# Patient Record
Sex: MALE | Race: WHITE | Employment: OTHER | URBAN - METROPOLITAN AREA
[De-identification: names, ages, dates, MRNs, and addresses within clinical notes are randomized per-mention and may not be internally consistent; named-entity substitution may affect disease eponyms.]

---

## 2017-01-08 NOTE — PROGRESS NOTES
[facility] Progress Note      Patient Name: Junior Mccann   YOB: 1945  Medical Record Number: [id number]  Attending Physician: Artem Courtney. Ángel Rashid M.D.        Date of Visit: 1/9/2017      Chief Complaint  Essential thrombocytosis - tobacco use but quit 2015; uses alcohol socially.        Current Medications     LISINOPRIL 10 MG Oral Tab TAKE 1 TABLET BY MOUTH DAILY Disp: 30 tablet Rfl: 2   Budesonide-Formoterol Fumarate 80-4.5 MCG/ACT Inhalation Aerosol Inhale 2 puffs into the lungs 2 Normal effort; no respiratory distress; clear to auscultation bilaterally. Cardiovascular     Regular rate and rhythm; normal S1S2. Extremities  No lower extremity edema. Integumentary      Skin is warm and dry.   Neurologic           Alert and oriente was not diagnostic and I recommend repeat biopsy. Patient would like to delay until after his planned surgery. Continue hydroxyurea without modification. Platelet count is within normal limits. Repeat labs in 2 weeks.     2.   Left neck mass: Surgery is jessica

## 2017-01-09 ENCOUNTER — OFFICE VISIT (OUTPATIENT)
Dept: HEMATOLOGY/ONCOLOGY | Facility: HOSPITAL | Age: 72
End: 2017-01-09
Attending: SPECIALIST
Payer: MEDICARE

## 2017-01-09 VITALS
HEART RATE: 80 BPM | DIASTOLIC BLOOD PRESSURE: 76 MMHG | OXYGEN SATURATION: 93 % | SYSTOLIC BLOOD PRESSURE: 113 MMHG | BODY MASS INDEX: 25.66 KG/M2 | WEIGHT: 159.63 LBS | HEIGHT: 65.95 IN | RESPIRATION RATE: 18 BRPM | TEMPERATURE: 98 F

## 2017-01-09 DIAGNOSIS — D75.89 MACROCYTOSIS: ICD-10-CM

## 2017-01-09 DIAGNOSIS — D47.3 ESSENTIAL THROMBOCYTHEMIA (HCC): Primary | ICD-10-CM

## 2017-01-09 DIAGNOSIS — R22.1 MASS OF LEFT SIDE OF NECK: ICD-10-CM

## 2017-01-09 LAB
BASOPHILS # BLD AUTO: 0.01 X10(3) UL (ref 0–0.1)
BASOPHILS NFR BLD AUTO: 0.3 %
EOSINOPHIL # BLD AUTO: 0.02 X10(3) UL (ref 0–0.3)
EOSINOPHIL NFR BLD AUTO: 0.6 %
ERYTHROCYTE [DISTWIDTH] IN BLOOD BY AUTOMATED COUNT: 23.2 % (ref 11.5–16)
HAV AB SERPL IA-ACNC: 330 PG/ML (ref 193–986)
HCT VFR BLD AUTO: 31.1 % (ref 37–53)
HEMOLYSIS: 1
HGB BLD-MCNC: 11 G/DL (ref 13–17)
ICTERUS: 1
IMMATURE GRANULOCYTE COUNT: 0.02 X10(3) UL (ref 0–1)
IMMATURE GRANULOCYTE RATIO %: 0.6 %
LIPEMIA: 1
LYMPHOCYTES # BLD AUTO: 0.62 X10(3) UL (ref 0.9–4)
LYMPHOCYTES NFR BLD AUTO: 17.3 %
MCH RBC QN AUTO: 38.9 PG (ref 27–33.2)
MCHC RBC AUTO-ENTMCNC: 35.4 G/DL (ref 31–37)
MCV RBC AUTO: 109.9 FL (ref 80–99)
MONOCYTES # BLD AUTO: 0.52 X10(3) UL (ref 0.1–0.6)
MONOCYTES NFR BLD AUTO: 14.5 %
NEUTROPHIL ABS PRELIM: 2.4 X10 (3) UL (ref 1.3–6.7)
NEUTROPHILS # BLD AUTO: 2.4 X10(3) UL (ref 1.3–6.7)
NEUTROPHILS NFR BLD AUTO: 66.7 %
PLATELET # BLD AUTO: 243 10(3)UL (ref 150–450)
PLATELET MORPHOLOGY: NORMAL
RBC # BLD AUTO: 2.83 X10(6)UL (ref 3.8–5.8)
RED CELL DISTRIBUTION WIDTH-SD: 86.9 FL (ref 35.1–46.3)
WBC # BLD AUTO: 3.6 X10(3) UL (ref 4–13)

## 2017-01-09 PROCEDURE — 99214 OFFICE O/P EST MOD 30 MIN: CPT | Performed by: SPECIALIST

## 2017-01-09 RX ORDER — HYDROXYUREA 500 MG/1
CAPSULE ORAL
Qty: 120 CAPSULE | Refills: 6 | Status: SHIPPED | OUTPATIENT
Start: 2017-01-09 | End: 2017-05-05

## 2017-01-09 NOTE — PROGRESS NOTES
Patient here today for follow up with Kelly Elder for Thrombocythemia. Patient denies pain. Patient stated is currently on Hydrea 500mg BID. Stated he is feeling good. Medication list medical history and medical history were reviewed and updated.     Jhonathan Will

## 2017-01-10 LAB — HEMATOCRIT (CLIENT SUPPLIED): 31.1 %

## 2017-01-16 PROBLEM — R22.1 MASS OF LEFT SIDE OF NECK: Status: ACTIVE | Noted: 2017-01-16

## 2017-01-23 ENCOUNTER — NURSE ONLY (OUTPATIENT)
Dept: HEMATOLOGY/ONCOLOGY | Facility: HOSPITAL | Age: 72
End: 2017-01-23
Attending: SPECIALIST
Payer: MEDICARE

## 2017-01-23 DIAGNOSIS — D47.3 ESSENTIAL THROMBOCYTHEMIA (HCC): Primary | ICD-10-CM

## 2017-01-23 LAB
BASOPHILS # BLD AUTO: 0.02 X10(3) UL (ref 0–0.1)
BASOPHILS NFR BLD AUTO: 0.4 %
EOSINOPHIL # BLD AUTO: 0.04 X10(3) UL (ref 0–0.3)
EOSINOPHIL NFR BLD AUTO: 0.8 %
HCT VFR BLD AUTO: 31.2 % (ref 37–53)
HGB BLD-MCNC: 10.9 G/DL (ref 13–17)
IMMATURE GRANULOCYTE COUNT: 0.02 X10(3) UL (ref 0–1)
IMMATURE GRANULOCYTE RATIO %: 0.4 %
LYMPHOCYTES # BLD AUTO: 0.71 X10(3) UL (ref 0.9–4)
LYMPHOCYTES NFR BLD AUTO: 14.8 %
MCH RBC QN AUTO: 40.7 PG (ref 27–33.2)
MCHC RBC AUTO-ENTMCNC: 34.9 G/DL (ref 31–37)
MCV RBC AUTO: 116.4 FL (ref 80–99)
MONOCYTES # BLD AUTO: 0.73 X10(3) UL (ref 0.1–0.6)
MONOCYTES NFR BLD AUTO: 15.2 %
NEUTROPHIL ABS PRELIM: 3.28 X10 (3) UL (ref 1.3–6.7)
NEUTROPHILS # BLD AUTO: 3.28 X10(3) UL (ref 1.3–6.7)
NEUTROPHILS NFR BLD AUTO: 68.4 %
PLATELET # BLD AUTO: 236 10(3)UL (ref 150–450)
PLATELET MORPHOLOGY: NORMAL
RBC # BLD AUTO: 2.68 X10(6)UL (ref 3.8–5.8)
WBC # BLD AUTO: 4.8 X10(3) UL (ref 4–13)

## 2017-01-23 PROCEDURE — 36415 COLL VENOUS BLD VENIPUNCTURE: CPT

## 2017-01-23 PROCEDURE — 85025 COMPLETE CBC W/AUTO DIFF WBC: CPT

## 2017-01-31 ENCOUNTER — LABORATORY ENCOUNTER (OUTPATIENT)
Dept: LAB | Age: 72
End: 2017-01-31
Attending: OTOLARYNGOLOGY
Payer: MEDICARE

## 2017-01-31 DIAGNOSIS — L72.0 EPIDERMAL CYST: Primary | ICD-10-CM

## 2017-01-31 PROCEDURE — 88304 TISSUE EXAM BY PATHOLOGIST: CPT

## 2017-02-02 DIAGNOSIS — E03.9 ACQUIRED HYPOTHYROIDISM: Primary | ICD-10-CM

## 2017-02-03 RX ORDER — LEVOTHYROXINE SODIUM 112 UG/1
TABLET ORAL
Qty: 90 TABLET | Refills: 0 | Status: SHIPPED | OUTPATIENT
Start: 2017-02-03 | End: 2017-05-22

## 2017-02-03 NOTE — TELEPHONE ENCOUNTER
Refill as per protocol. LOV 11/18/2016    No future appointments.        Pending Prescriptions Disp Refills    LEVOTHYROXINE SODIUM 112 MCG Oral Tab [Pharmacy Med Name: LEVOTHYROXINE 0.112MG (112MCG) TABS] 90 tablet 0     Sig: TAKE 1 TABLET(112 MCG) BY M

## 2017-02-06 ENCOUNTER — MED REC SCAN ONLY (OUTPATIENT)
Dept: FAMILY MEDICINE CLINIC | Facility: CLINIC | Age: 72
End: 2017-02-06

## 2017-02-09 ENCOUNTER — TELEPHONE (OUTPATIENT)
Dept: HEMATOLOGY/ONCOLOGY | Facility: HOSPITAL | Age: 72
End: 2017-02-09

## 2017-02-09 ENCOUNTER — OFFICE VISIT (OUTPATIENT)
Dept: HEMATOLOGY/ONCOLOGY | Facility: HOSPITAL | Age: 72
End: 2017-02-09
Attending: SPECIALIST
Payer: MEDICARE

## 2017-02-09 VITALS
TEMPERATURE: 97 F | HEIGHT: 65.95 IN | HEART RATE: 84 BPM | RESPIRATION RATE: 18 BRPM | DIASTOLIC BLOOD PRESSURE: 75 MMHG | BODY MASS INDEX: 26.03 KG/M2 | OXYGEN SATURATION: 95 % | SYSTOLIC BLOOD PRESSURE: 112 MMHG | WEIGHT: 162 LBS

## 2017-02-09 DIAGNOSIS — C61 PROSTATE CANCER (HCC): ICD-10-CM

## 2017-02-09 DIAGNOSIS — D47.3 ESSENTIAL THROMBOCYTHEMIA (HCC): Primary | ICD-10-CM

## 2017-02-09 LAB
ALBUMIN SERPL-MCNC: 3.7 G/DL (ref 3.5–4.8)
ALP LIVER SERPL-CCNC: 93 U/L (ref 45–117)
ALT SERPL-CCNC: 20 U/L (ref 17–63)
AST SERPL-CCNC: 12 U/L (ref 15–41)
BASOPHILS # BLD AUTO: 0.01 X10(3) UL (ref 0–0.1)
BASOPHILS NFR BLD AUTO: 0.2 %
BILIRUB SERPL-MCNC: 0.6 MG/DL (ref 0.1–2)
BUN BLD-MCNC: 12 MG/DL (ref 8–20)
CALCIUM BLD-MCNC: 9.5 MG/DL (ref 8.3–10.3)
CHLORIDE: 109 MMOL/L (ref 101–111)
CO2: 25 MMOL/L (ref 22–32)
CREAT BLD-MCNC: 0.81 MG/DL (ref 0.7–1.3)
EOSINOPHIL # BLD AUTO: 0.04 X10(3) UL (ref 0–0.3)
EOSINOPHIL NFR BLD AUTO: 1 %
ERYTHROCYTE [DISTWIDTH] IN BLOOD BY AUTOMATED COUNT: 17.6 % (ref 11.5–16)
GLUCOSE BLD-MCNC: 89 MG/DL (ref 70–99)
HCT VFR BLD AUTO: 32.4 % (ref 37–53)
HGB BLD-MCNC: 11.5 G/DL (ref 13–17)
IMMATURE GRANULOCYTE COUNT: 0.01 X10(3) UL (ref 0–1)
IMMATURE GRANULOCYTE RATIO %: 0.2 %
LYMPHOCYTES # BLD AUTO: 0.68 X10(3) UL (ref 0.9–4)
LYMPHOCYTES NFR BLD AUTO: 16.2 %
M PROTEIN MFR SERPL ELPH: 7 G/DL (ref 6.1–8.3)
MCH RBC QN AUTO: 43.6 PG (ref 27–33.2)
MCHC RBC AUTO-ENTMCNC: 35.5 G/DL (ref 31–37)
MCV RBC AUTO: 122.7 FL (ref 80–99)
MONOCYTES # BLD AUTO: 0.49 X10(3) UL (ref 0.1–0.6)
MONOCYTES NFR BLD AUTO: 11.7 %
NEUTROPHIL ABS PRELIM: 2.97 X10 (3) UL (ref 1.3–6.7)
NEUTROPHILS # BLD AUTO: 2.97 X10(3) UL (ref 1.3–6.7)
NEUTROPHILS NFR BLD AUTO: 70.7 %
PLATELET # BLD AUTO: 213 10(3)UL (ref 150–450)
PLATELET MORPHOLOGY: NORMAL
POTASSIUM SERPL-SCNC: 4.3 MMOL/L (ref 3.6–5.1)
PSA SERPL-MCNC: <0.01 NG/ML (ref 0.01–4)
RBC # BLD AUTO: 2.64 X10(6)UL (ref 3.8–5.8)
RED CELL DISTRIBUTION WIDTH-SD: 79.1 FL (ref 35.1–46.3)
SODIUM SERPL-SCNC: 141 MMOL/L (ref 136–144)
WBC # BLD AUTO: 4.2 X10(3) UL (ref 4–13)

## 2017-02-09 PROCEDURE — 99213 OFFICE O/P EST LOW 20 MIN: CPT | Performed by: SPECIALIST

## 2017-02-09 NOTE — TELEPHONE ENCOUNTER
MD Hui Haddad, RN                     Let patient know PSA is undetectable. You can leave message on his cell phone.

## 2017-02-09 NOTE — PROGRESS NOTES
Patient is here today for follow up with Daisy Curtis for Thrombocythemia. Patient denies pain. Stated is taking Hydroxyurea 500mg BID. Stated has no side effects and is feeling good. Medication list and medical history were reviewed and updated.     Educatio

## 2017-02-09 NOTE — PROGRESS NOTES
[facility] Progress Note      Patient Name: Eli Harrell   YOB: 1945  Medical Record Number: [id number]  Attending Physician: Vamshi Funez. Frandy Head M.D.        Date of Visit: 2/9/2017      Chief Complaint  Essential thrombocytosis - Oral Tab TAKE 1 TABLET(112 MCG) BY MOUTH BEFORE BREAKFAST Disp: 90 tablet Rfl: 0   HYDROXYUREA 500 MG Oral Cap TAKE 2 CAPSULE BY MOUTH TWICE DAILY Disp: 120 capsule Rfl: 6   LISINOPRIL 10 MG Oral Tab TAKE 1 TABLET BY MOUTH DAILY Disp: 30 tablet Rfl: 2   Bu Mood and affect appropriate; coherent speech; verbalizes understanding of our discussions today.     Laboratory     Recent Results (from the past 24 hour(s))  -COMP METABOLIC PANEL (14)   Collection Time: 02/09/17  9:55 AM   Result Value Ref Range   Glucos controlled. Plan for repeat bone marrow biopsy on 02/24/2017.     2.   Prostate cancer: PSA is undetectable. Continue active surveillance. Planned Follow Up   Patient will return on 02/24/2017 for bone marrow biopsy.      Risk Level: High - Essential th

## 2017-02-24 ENCOUNTER — OFFICE VISIT (OUTPATIENT)
Dept: HEMATOLOGY/ONCOLOGY | Facility: HOSPITAL | Age: 72
End: 2017-02-24
Attending: SPECIALIST
Payer: MEDICARE

## 2017-02-24 VITALS
RESPIRATION RATE: 18 BRPM | DIASTOLIC BLOOD PRESSURE: 71 MMHG | HEIGHT: 65.95 IN | SYSTOLIC BLOOD PRESSURE: 110 MMHG | BODY MASS INDEX: 25.07 KG/M2 | OXYGEN SATURATION: 93 % | HEART RATE: 75 BPM | WEIGHT: 156 LBS | TEMPERATURE: 98 F

## 2017-02-24 DIAGNOSIS — C61 PROSTATE CANCER (HCC): ICD-10-CM

## 2017-02-24 DIAGNOSIS — D75.839 THROMBOCYTOSIS: Primary | ICD-10-CM

## 2017-02-24 DIAGNOSIS — D47.3 ESSENTIAL THROMBOCYTHEMIA (HCC): ICD-10-CM

## 2017-02-24 LAB
BASOPHILS # BLD AUTO: 0.01 X10(3) UL (ref 0–0.1)
BASOPHILS NFR BLD AUTO: 0.3 %
EOSINOPHIL # BLD AUTO: 0.04 X10(3) UL (ref 0–0.3)
EOSINOPHIL NFR BLD AUTO: 1 %
ERYTHROCYTE [DISTWIDTH] IN BLOOD BY AUTOMATED COUNT: 12.8 % (ref 11.5–16)
HCT VFR BLD AUTO: 33.1 % (ref 37–53)
HGB BLD-MCNC: 11.7 G/DL (ref 13–17)
IMMATURE GRANULOCYTE COUNT: 0.01 X10(3) UL (ref 0–1)
IMMATURE GRANULOCYTE RATIO %: 0.3 %
LYMPHOCYTES # BLD AUTO: 0.71 X10(3) UL (ref 0.9–4)
LYMPHOCYTES NFR BLD AUTO: 18.3 %
MCH RBC QN AUTO: 44.5 PG (ref 27–33.2)
MCHC RBC AUTO-ENTMCNC: 35.3 G/DL (ref 31–37)
MCV RBC AUTO: 125.9 FL (ref 80–99)
MONOCYTES # BLD AUTO: 0.48 X10(3) UL (ref 0.1–0.6)
MONOCYTES NFR BLD AUTO: 12.4 %
NEUTROPHIL ABS PRELIM: 2.63 X10 (3) UL (ref 1.3–6.7)
NEUTROPHILS # BLD AUTO: 2.63 X10(3) UL (ref 1.3–6.7)
NEUTROPHILS NFR BLD AUTO: 67.7 %
PLATELET # BLD AUTO: 260 10(3)UL (ref 150–450)
RBC # BLD AUTO: 2.63 X10(6)UL (ref 3.8–5.8)
RED CELL DISTRIBUTION WIDTH-SD: 58.1 FL (ref 35.1–46.3)
WBC # BLD AUTO: 3.9 X10(3) UL (ref 4–13)

## 2017-02-24 PROCEDURE — 38221 DX BONE MARROW BIOPSIES: CPT | Performed by: SPECIALIST

## 2017-02-24 PROCEDURE — 07DR3ZX EXTRACTION OF ILIAC BONE MARROW, PERCUTANEOUS APPROACH, DIAGNOSTIC: ICD-10-PCS | Performed by: SPECIALIST

## 2017-02-24 PROCEDURE — 38220 DX BONE MARROW ASPIRATIONS: CPT | Performed by: SPECIALIST

## 2017-02-24 NOTE — PATIENT INSTRUCTIONS
Post Bone Marrow Biopsy Instructions    Following the examination:     After your bone marrow exam, a bandage is applied to help minimize bleeding.     If you had local anesthesia, you may have to lie on your back for at least 15 minutes to apply pressure t

## 2017-02-24 NOTE — PROGRESS NOTES
Patient is here today for bone marrow biopsy and aspiration. Patient denies pain.      Education Record    Learner:  Patient    Disease / Diagnosis: Bone marrow biopsy and aspiration    Barriers / Limitations:  None   Comments:    Method:  Brief Watson graham

## 2017-02-28 ENCOUNTER — TELEPHONE (OUTPATIENT)
Dept: FAMILY MEDICINE CLINIC | Facility: CLINIC | Age: 72
End: 2017-02-28

## 2017-02-28 DIAGNOSIS — I10 ESSENTIAL HYPERTENSION: Primary | ICD-10-CM

## 2017-02-28 RX ORDER — LISINOPRIL 10 MG/1
10 TABLET ORAL
Qty: 90 TABLET | Refills: 0 | Status: SHIPPED | OUTPATIENT
Start: 2017-02-28 | End: 2017-12-31

## 2017-02-28 NOTE — TELEPHONE ENCOUNTER
Betty Day from [place] [facility] asking for 90 day refill of \" Lisinopril\" for PT.

## 2017-02-28 NOTE — TELEPHONE ENCOUNTER
Refill as per protocol. LOV 11/18/2016    No future appointments. Pending Prescriptions Disp Refills    lisinopril 10 MG Oral Tab 90 tablet 0     Sig: Take 1 tablet (10 mg total) by mouth once daily.           Kidney:    Lab Results  Component Valu

## 2017-03-02 LAB
CD19+CD10+: 26.4 %
CD19+CD20+: 73 %
CD19+CD22+: 73.3 %
CD19+CD25+: <0.1 %
CD19+CD5+: 2.5 %
CD19+KAPPA+: 44 %
CD19+LAMBDA+: 29.9 %
CD3+CD2+: 99.9 %
CD3+CD4+: 61.9 %
CD3+CD4+CD8+: 1.3 %
CD3+CD5+: 99.1 %
CD3+CD7+: 94.6 %
CD3+CD8+: 34.5 %
CD4+:CD8+ RATIO: 1.8
KAPPA:LAMBDA RATIO: 1.47

## 2017-03-05 PROBLEM — D75.839 THROMBOCYTOSIS: Status: ACTIVE | Noted: 2017-03-05

## 2017-03-05 NOTE — PROGRESS NOTES
[facility] Bone Marrow Biopsy Procedure Note      Patient Name: Yury Hayward   YOB: 1945  Medical Record Number: [id number]  Attending Physician: Madelin Leary M.D.        Date of Procedure: 2/24/2017      Narrative  Patient

## 2017-03-17 ENCOUNTER — TELEPHONE (OUTPATIENT)
Dept: HEMATOLOGY/ONCOLOGY | Facility: HOSPITAL | Age: 72
End: 2017-03-17

## 2017-03-17 ENCOUNTER — NURSE ONLY (OUTPATIENT)
Dept: HEMATOLOGY/ONCOLOGY | Facility: HOSPITAL | Age: 72
End: 2017-03-17
Attending: SPECIALIST
Payer: MEDICARE

## 2017-03-17 DIAGNOSIS — D47.3 ESSENTIAL THROMBOCYTHEMIA (HCC): Primary | ICD-10-CM

## 2017-03-17 LAB
BASOPHILS # BLD AUTO: 0.01 X10(3) UL (ref 0–0.1)
BASOPHILS NFR BLD AUTO: 0.3 %
EOSINOPHIL # BLD AUTO: 0.04 X10(3) UL (ref 0–0.3)
EOSINOPHIL NFR BLD AUTO: 1 %
ERYTHROCYTE [DISTWIDTH] IN BLOOD BY AUTOMATED COUNT: 10.7 % (ref 11.5–16)
HCT VFR BLD AUTO: 36.2 % (ref 37–53)
HGB BLD-MCNC: 12.7 G/DL (ref 13–17)
IMMATURE GRANULOCYTE COUNT: 0.01 X10(3) UL (ref 0–1)
IMMATURE GRANULOCYTE RATIO %: 0.3 %
LYMPHOCYTES # BLD AUTO: 0.73 X10(3) UL (ref 0.9–4)
LYMPHOCYTES NFR BLD AUTO: 18.4 %
MCH RBC QN AUTO: 43.2 PG (ref 27–33.2)
MCHC RBC AUTO-ENTMCNC: 35.1 G/DL (ref 31–37)
MCV RBC AUTO: 123.1 FL (ref 80–99)
MONOCYTES # BLD AUTO: 0.68 X10(3) UL (ref 0.1–0.6)
MONOCYTES NFR BLD AUTO: 17.1 %
NEUTROPHIL ABS PRELIM: 2.5 X10 (3) UL (ref 1.3–6.7)
NEUTROPHILS # BLD AUTO: 2.5 X10(3) UL (ref 1.3–6.7)
NEUTROPHILS NFR BLD AUTO: 62.9 %
PLATELET # BLD AUTO: 343 10(3)UL (ref 150–450)
RBC # BLD AUTO: 2.94 X10(6)UL (ref 3.8–5.8)
RED CELL DISTRIBUTION WIDTH-SD: 49.3 FL (ref 35.1–46.3)
WBC # BLD AUTO: 4 X10(3) UL (ref 4–13)

## 2017-03-17 PROCEDURE — 85025 COMPLETE CBC W/AUTO DIFF WBC: CPT

## 2017-03-17 PROCEDURE — 36415 COLL VENOUS BLD VENIPUNCTURE: CPT

## 2017-03-17 NOTE — TELEPHONE ENCOUNTER
MD Jazmín Garber, RN                     Platelet count is higher but normal. Stay off of hydrea. Continue folic acid. Repeat CBC weekly for now      Patient notified. Stated understanding. Repeated instruction.   Will follow up wit

## 2017-03-24 ENCOUNTER — NURSE ONLY (OUTPATIENT)
Dept: HEMATOLOGY/ONCOLOGY | Facility: HOSPITAL | Age: 72
End: 2017-03-24
Attending: SPECIALIST
Payer: MEDICARE

## 2017-03-24 ENCOUNTER — TELEPHONE (OUTPATIENT)
Dept: HEMATOLOGY/ONCOLOGY | Facility: HOSPITAL | Age: 72
End: 2017-03-24

## 2017-03-24 DIAGNOSIS — D75.839 THROMBOCYTOSIS: ICD-10-CM

## 2017-03-24 LAB
BASOPHILS # BLD AUTO: 0.02 X10(3) UL (ref 0–0.1)
BASOPHILS NFR BLD AUTO: 0.4 %
EOSINOPHIL # BLD AUTO: 0.05 X10(3) UL (ref 0–0.3)
EOSINOPHIL NFR BLD AUTO: 1.1 %
ERYTHROCYTE [DISTWIDTH] IN BLOOD BY AUTOMATED COUNT: 10.9 % (ref 11.5–16)
HCT VFR BLD AUTO: 37.4 % (ref 37–53)
HGB BLD-MCNC: 13.1 G/DL (ref 13–17)
IMMATURE GRANULOCYTE COUNT: 0.01 X10(3) UL (ref 0–1)
IMMATURE GRANULOCYTE RATIO %: 0.2 %
LYMPHOCYTES # BLD AUTO: 0.64 X10(3) UL (ref 0.9–4)
LYMPHOCYTES NFR BLD AUTO: 13.8 %
MCH RBC QN AUTO: 42.1 PG (ref 27–33.2)
MCHC RBC AUTO-ENTMCNC: 35 G/DL (ref 31–37)
MCV RBC AUTO: 120.3 FL (ref 80–99)
MONOCYTES # BLD AUTO: 0.79 X10(3) UL (ref 0.1–0.6)
MONOCYTES NFR BLD AUTO: 17 %
NEUTROPHIL ABS PRELIM: 3.13 X10 (3) UL (ref 1.3–6.7)
NEUTROPHILS # BLD AUTO: 3.13 X10(3) UL (ref 1.3–6.7)
NEUTROPHILS NFR BLD AUTO: 67.5 %
PLATELET # BLD AUTO: 388 10(3)UL (ref 150–450)
RBC # BLD AUTO: 3.11 X10(6)UL (ref 3.8–5.8)
RED CELL DISTRIBUTION WIDTH-SD: 48.8 FL (ref 35.1–46.3)
WBC # BLD AUTO: 4.6 X10(3) UL (ref 4–13)

## 2017-03-24 PROCEDURE — 36415 COLL VENOUS BLD VENIPUNCTURE: CPT

## 2017-03-24 PROCEDURE — 85025 COMPLETE CBC W/AUTO DIFF WBC: CPT

## 2017-03-24 NOTE — TELEPHONE ENCOUNTER
MD Theresa Uribe, RN                     Let him know platelet count is still ok at 388. Repeat labs in 2 weeks      Patient notified of lab results and instruction per Vern Vital.   Stated understanding will return for labs in 2 wee

## 2017-04-07 ENCOUNTER — TELEPHONE (OUTPATIENT)
Dept: HEMATOLOGY/ONCOLOGY | Facility: HOSPITAL | Age: 72
End: 2017-04-07

## 2017-04-07 ENCOUNTER — NURSE ONLY (OUTPATIENT)
Dept: HEMATOLOGY/ONCOLOGY | Facility: HOSPITAL | Age: 72
End: 2017-04-07
Attending: SPECIALIST
Payer: MEDICARE

## 2017-04-07 DIAGNOSIS — D47.3 ESSENTIAL THROMBOCYTHEMIA (HCC): ICD-10-CM

## 2017-04-07 PROCEDURE — 85025 COMPLETE CBC W/AUTO DIFF WBC: CPT

## 2017-04-07 PROCEDURE — 36415 COLL VENOUS BLD VENIPUNCTURE: CPT

## 2017-04-07 NOTE — TELEPHONE ENCOUNTER
MD Malka Vallejo, TARAN                     Let him know that counts are normal. Want him to come in one months for labs and f/u. At that time I'll also be checking his PSA. Patient notified stated understanding.   Transferred t

## 2017-05-05 ENCOUNTER — OFFICE VISIT (OUTPATIENT)
Dept: HEMATOLOGY/ONCOLOGY | Facility: HOSPITAL | Age: 72
End: 2017-05-05
Attending: SPECIALIST
Payer: MEDICARE

## 2017-05-05 ENCOUNTER — TELEPHONE (OUTPATIENT)
Dept: HEMATOLOGY/ONCOLOGY | Facility: HOSPITAL | Age: 72
End: 2017-05-05

## 2017-05-05 VITALS
RESPIRATION RATE: 18 BRPM | SYSTOLIC BLOOD PRESSURE: 135 MMHG | TEMPERATURE: 96 F | OXYGEN SATURATION: 95 % | HEIGHT: 65.95 IN | BODY MASS INDEX: 25.58 KG/M2 | HEART RATE: 75 BPM | WEIGHT: 159.19 LBS | DIASTOLIC BLOOD PRESSURE: 91 MMHG

## 2017-05-05 DIAGNOSIS — E03.9 ACQUIRED HYPOTHYROIDISM: ICD-10-CM

## 2017-05-05 DIAGNOSIS — C61 PROSTATE CANCER (HCC): ICD-10-CM

## 2017-05-05 DIAGNOSIS — D75.839 THROMBOCYTOSIS: ICD-10-CM

## 2017-05-05 DIAGNOSIS — D75.89 MACROCYTOSIS WITHOUT ANEMIA: Primary | ICD-10-CM

## 2017-05-05 PROCEDURE — 99214 OFFICE O/P EST MOD 30 MIN: CPT | Performed by: SPECIALIST

## 2017-05-05 NOTE — PROGRESS NOTES
Follow up with Vern Vital for Essential Thrombocythemia. Patient denies pain. Patient Hydrea is on hold. Medication list and medical history were reviewed and updated.     Education Record    Learner:  Patient    Disease / Diagnosis: essential thrombocythem

## 2017-05-05 NOTE — TELEPHONE ENCOUNTER
MD Tyler Joshua, TARAN                     Let me know PSA is 0. Folate and B12 results are normal. Waiting for one more test that will come next week. He can stop folic acid.

## 2017-05-08 ENCOUNTER — TELEPHONE (OUTPATIENT)
Dept: HEMATOLOGY/ONCOLOGY | Facility: HOSPITAL | Age: 72
End: 2017-05-08

## 2017-05-08 NOTE — TELEPHONE ENCOUNTER
MD Aniceto Aranda RN                     Last lab we're waiting for is normal. The abnormal findings on his CBC could be explained by alcohol so continue to restrain for a month and we'll repeat labs. Patient notified.   Sta

## 2017-05-09 NOTE — PROGRESS NOTES
[facility] Progress Note      Patient Name: Sandrita Bui   YOB: 1945  Medical Record Number: [id number]  Attending Physician: Yoli Mccrary. Shaheed Kuo M.D.        Date of Visit: 5/5/2017      Chief Complaint  Thrombocytosis, prostate ca Take 1 tablet (10 mg total) by mouth once daily.  Disp: 90 tablet Rfl: 0   LEVOTHYROXINE SODIUM 112 MCG Oral Tab TAKE 1 TABLET(112 MCG) BY MOUTH BEFORE BREAKFAST Disp: 90 tablet Rfl: 0   [DISCONTINUED] folic acid 1 MG Oral Tab Take 1 tablet (1 mg total) by 8. 3-10.3 mg/dL   Alkaline Phosphatase 91  U/L   AST 16 15-41 U/L   Alt 23 17-63 U/L   Bilirubin, Total 0.4 0.1-2.0 mg/dL   Total Protein 7.3 6.1-8.3 g/dL   Albumin 3.6 3.5-4.8 g/dL   Sodium 140 136-144 mmol/L   Potassium 4.6 3.6-5.1 mmol/L   Chloride draw. Upon direct questioning, patient admitted that he drinks 6 cans of beer per day. Alcohol can have a direct toxic affect upon the bone marrow and cause macrocytosis. Patient agrees to stop all alcohol use x 1 month.  Labs will be repeated at the end of

## 2017-05-13 PROBLEM — D75.89 MACROCYTOSIS WITHOUT ANEMIA: Status: ACTIVE | Noted: 2017-05-13

## 2017-05-22 DIAGNOSIS — E03.9 ACQUIRED HYPOTHYROIDISM: Primary | ICD-10-CM

## 2017-05-22 RX ORDER — LEVOTHYROXINE SODIUM 112 UG/1
TABLET ORAL
Qty: 90 TABLET | Refills: 0 | Status: SHIPPED | OUTPATIENT
Start: 2017-05-22 | End: 2017-08-16

## 2017-05-22 NOTE — TELEPHONE ENCOUNTER
Refill as per protocol.     LOV 11/18/2016    Future Appointments  Date Time Provider Geoff Zamora   5/26/2017 9:30 AM Yessy Barahona MD [facility]   6/2/2017 9:15 AM [address] [facility] [address] [place] [address]          Pending Prescriptions Disp Refills

## 2017-05-26 ENCOUNTER — OFFICE VISIT (OUTPATIENT)
Dept: FAMILY MEDICINE CLINIC | Facility: CLINIC | Age: 72
End: 2017-05-26

## 2017-05-26 VITALS
BODY MASS INDEX: 25.71 KG/M2 | WEIGHT: 160 LBS | OXYGEN SATURATION: 96 % | HEIGHT: 65.95 IN | SYSTOLIC BLOOD PRESSURE: 110 MMHG | DIASTOLIC BLOOD PRESSURE: 76 MMHG | TEMPERATURE: 99 F | HEART RATE: 64 BPM | RESPIRATION RATE: 16 BRPM

## 2017-05-26 DIAGNOSIS — Z00.00 ENCOUNTER FOR ANNUAL HEALTH EXAMINATION: Primary | ICD-10-CM

## 2017-05-26 DIAGNOSIS — I70.0 ATHEROSCLEROSIS OF AORTA (HCC): ICD-10-CM

## 2017-05-26 DIAGNOSIS — M47.896 OTHER OSTEOARTHRITIS OF SPINE, LUMBAR REGION: ICD-10-CM

## 2017-05-26 DIAGNOSIS — E78.5 HYPERLIPIDEMIA, UNSPECIFIED HYPERLIPIDEMIA TYPE: ICD-10-CM

## 2017-05-26 DIAGNOSIS — I10 ESSENTIAL HYPERTENSION: ICD-10-CM

## 2017-05-26 DIAGNOSIS — H91.93 BILATERAL HEARING LOSS, UNSPECIFIED HEARING LOSS TYPE: ICD-10-CM

## 2017-05-26 DIAGNOSIS — Z23 NEED FOR PNEUMOCOCCAL VACCINATION: ICD-10-CM

## 2017-05-26 DIAGNOSIS — C61 PROSTATE CANCER (HCC): ICD-10-CM

## 2017-05-26 DIAGNOSIS — E03.9 ACQUIRED HYPOTHYROIDISM: ICD-10-CM

## 2017-05-26 DIAGNOSIS — D75.839 THROMBOCYTOSIS: ICD-10-CM

## 2017-05-26 DIAGNOSIS — D75.89 MACROCYTOSIS WITHOUT ANEMIA: ICD-10-CM

## 2017-05-26 PROBLEM — D69.6 THROMBOCYTOPENIA: Chronic | Status: ACTIVE | Noted: 2017-05-26

## 2017-05-26 PROBLEM — D69.6 THROMBOCYTOPENIA (HCC): Chronic | Status: ACTIVE | Noted: 2017-05-26

## 2017-05-26 PROCEDURE — G0009 ADMIN PNEUMOCOCCAL VACCINE: HCPCS | Performed by: FAMILY MEDICINE

## 2017-05-26 PROCEDURE — 96160 PT-FOCUSED HLTH RISK ASSMT: CPT | Performed by: FAMILY MEDICINE

## 2017-05-26 PROCEDURE — 99397 PER PM REEVAL EST PAT 65+ YR: CPT | Performed by: FAMILY MEDICINE

## 2017-05-26 PROCEDURE — G0439 PPPS, SUBSEQ VISIT: HCPCS | Performed by: FAMILY MEDICINE

## 2017-05-26 PROCEDURE — 90732 PPSV23 VACC 2 YRS+ SUBQ/IM: CPT | Performed by: FAMILY MEDICINE

## 2017-05-26 NOTE — PROGRESS NOTES
HPI:   Mariam Wade is a 73year old male who presents for a MA (Medicare Advantage) [facility] (Once per calendar year). Pt is overall doing well  Will be retiring next week and plans to sell home and downsize.  Is looking forward to [facility]  H has No Known Allergies. CURRENT MEDICATIONS:     Outpatient Prescriptions Marked as Taking for the 5/26/17 encounter (Office Visit) with Yun Suárez MD:  aspirin 81 MG Oral Tab Take 81 mg by mouth daily. BIOTIN OR Take by mouth daily.    FOLIC AC surgery , deferred any tx for this and ok w/ pt and wife  MUSCULOSKELETAL: denies back pain  NEURO: denies headaches  PSYCHE: denies depression or anxiety  HEMATOLOGIC: denies hx of anemia  ENDOCRINE: on levothyroxine, feeling fine on current dose  ALL/AST deferred   Rectal: deferred   Extremities: Extremities normal, atraumatic, no cyanosis or edema   Pulses: 2+ and symmetric   Skin: Skin color, texture, turgor normal, no rashes or lesions   Lymph nodes: Cervical, supraclavicular, and axillary nodes normal Acquired hypothyroidism  Continue current levothyroxine dose. Monitor TFTs. 7. Bilateral hearing loss, unspecified hearing loss type  Patient had MRI, IACs in 2013, without significant findings. Defers hearing aids.   States symptoms are stable and is activity?: Light    How would you describe your current health state?: Good    How do you maintain positive mental well-being?: Visiting Family    If you are a male age 38-65 or a female age 47-67, do you take aspirin?: Yes    Have you had any immunization your progress note to see your input here.   Cognitive Assessment     What day of the week is this?: Correct    What month is it?: Correct    What year is it?: Correct    Recall \"Ball\": Correct    Recall \"Flag\": Correct    Recall \"Tree\": Correct Reviewed indication. Written order given.      SPECIFIC DISEASE MONITORING Internal Lab or Procedure External Lab or Procedure   Annual Monitoring of Persistent     Medications (ACE/ARB, digoxin diuretics, anticonvulsants.)    Potassium  Annually

## 2017-05-26 NOTE — PATIENT INSTRUCTIONS
Samuel Romero's SCREENING SCHEDULE   Tests on this list are recommended by your physician but may not be covered, or covered at this frequency, by your insurer. Please check with your insurance carrier before scheduling to verify coverage.     Florina Acuna Covered every 10 years- more often if abnormal Colonoscopy,10 Years due on 01/01/2018 Update [facility] if applicable    Flex Sigmoidoscopy Screen  Covered every 5 years No results found for this or any previous visit. No flowsheet data found. with your prescription benefits, but Medicare does not cover unless Medically needed    Zoster (Not covered by Medicare Part B) No orders found for this or any previous visit.  This may be covered with your pharmacy  prescription benefits     Recommended We

## 2017-05-29 PROBLEM — D69.6 THROMBOCYTOPENIA: Chronic | Status: RESOLVED | Noted: 2017-05-26 | Resolved: 2017-05-29

## 2017-05-29 PROBLEM — R22.1 MASS OF LEFT SIDE OF NECK: Status: RESOLVED | Noted: 2017-01-16 | Resolved: 2017-05-29

## 2017-05-29 PROBLEM — D69.6 THROMBOCYTOPENIA (HCC): Chronic | Status: RESOLVED | Noted: 2017-05-26 | Resolved: 2017-05-29

## 2017-06-02 ENCOUNTER — NURSE ONLY (OUTPATIENT)
Dept: HEMATOLOGY/ONCOLOGY | Facility: HOSPITAL | Age: 72
End: 2017-06-02
Attending: SPECIALIST
Payer: MEDICARE

## 2017-06-02 DIAGNOSIS — C61 PROSTATE CANCER (HCC): ICD-10-CM

## 2017-06-02 DIAGNOSIS — I10 ESSENTIAL HYPERTENSION: ICD-10-CM

## 2017-06-02 DIAGNOSIS — D75.89 MACROCYTOSIS WITHOUT ANEMIA: Primary | ICD-10-CM

## 2017-06-02 DIAGNOSIS — D75.839 THROMBOCYTOSIS: ICD-10-CM

## 2017-06-02 DIAGNOSIS — E03.9 ACQUIRED HYPOTHYROIDISM: ICD-10-CM

## 2017-06-02 PROCEDURE — 80048 BASIC METABOLIC PNL TOTAL CA: CPT

## 2017-06-02 PROCEDURE — 84443 ASSAY THYROID STIM HORMONE: CPT

## 2017-06-02 PROCEDURE — 36415 COLL VENOUS BLD VENIPUNCTURE: CPT

## 2017-06-02 PROCEDURE — 85025 COMPLETE CBC W/AUTO DIFF WBC: CPT

## 2017-06-06 ENCOUNTER — TELEPHONE (OUTPATIENT)
Dept: HEMATOLOGY/ONCOLOGY | Facility: HOSPITAL | Age: 72
End: 2017-06-06

## 2017-06-06 NOTE — TELEPHONE ENCOUNTER
Harvey Duane, MD Lizette Members, RN                     Confirm that patient decreased his alcohol intake. His CBC shows that his MCV has improved. If he did stop drinking, then the improvement is because of that.  Recommend that he continue to mod

## 2017-06-12 NOTE — TELEPHONE ENCOUNTER
Patient stated did not have any alcohol for one month prior to lab draw. Notified of results and follow up. Patient stated will continue to decrease or discontinue alcohol use. Transferred to [facility] to Schedule follow up appointment.

## 2017-08-03 ENCOUNTER — APPOINTMENT (OUTPATIENT)
Dept: HEMATOLOGY/ONCOLOGY | Facility: HOSPITAL | Age: 72
End: 2017-08-03
Attending: SPECIALIST
Payer: MEDICARE

## 2017-08-03 NOTE — PROGRESS NOTES
[facility] Progress Note      Patient Name: Brionna Cook   YOB: 1945  Medical Record Number: [id number]  Attending Physician: Anita Andino M.D.        Date of Visit: 8/4/2017      Chief Complaint  Thrombocytosis, prostate ca prostate cancer. Social History (historical data, reviewed)  Previous tobacco use but quit 2015; uses alcohol daily. Current Medications     Vitamin B-12 1000 MCG Oral Tab Take 1,000 mcg by mouth daily.  Disp:  Rfl:    aspirin 81 MG Oral Tab Take 81 bilaterally. Cardiovascular  Regular rate and rhythm; normal S1S2. Abdomen  Soft; nontender; no masses; no hepatosplenomegaly. Extremities  No lower extremity edema. Neurologic           Alert and oriented x 3; motor and sensory grossly intact.   Psych Impression and Plan   1. Thrombocytosis: Resolved. 2.   Prostate cancer: PSA is undetectable. Continue active surveillance. 3.   Macrocytosis: Resolved. Planned Follow Up   Patient will return for follow up in 6 months.      Risk Level: H

## 2017-08-04 ENCOUNTER — OFFICE VISIT (OUTPATIENT)
Dept: HEMATOLOGY/ONCOLOGY | Facility: HOSPITAL | Age: 72
End: 2017-08-04
Attending: SPECIALIST
Payer: MEDICARE

## 2017-08-04 VITALS
SYSTOLIC BLOOD PRESSURE: 113 MMHG | WEIGHT: 158.5 LBS | TEMPERATURE: 97 F | DIASTOLIC BLOOD PRESSURE: 74 MMHG | HEIGHT: 65.95 IN | OXYGEN SATURATION: 97 % | BODY MASS INDEX: 25.47 KG/M2 | RESPIRATION RATE: 18 BRPM | HEART RATE: 63 BPM

## 2017-08-04 DIAGNOSIS — C61 PROSTATE CANCER (HCC): Primary | ICD-10-CM

## 2017-08-04 DIAGNOSIS — D75.89 MACROCYTOSIS WITHOUT ANEMIA: ICD-10-CM

## 2017-08-04 DIAGNOSIS — D75.839 THROMBOCYTOSIS: ICD-10-CM

## 2017-08-04 LAB
ALBUMIN SERPL-MCNC: 3.7 G/DL (ref 3.5–4.8)
ALP LIVER SERPL-CCNC: 87 U/L (ref 45–117)
ALT SERPL-CCNC: 20 U/L (ref 17–63)
AST SERPL-CCNC: 14 U/L (ref 15–41)
BASOPHILS # BLD AUTO: 0.02 X10(3) UL (ref 0–0.1)
BASOPHILS NFR BLD AUTO: 0.5 %
BILIRUB SERPL-MCNC: 0.6 MG/DL (ref 0.1–2)
BUN BLD-MCNC: 14 MG/DL (ref 8–20)
CALCIUM BLD-MCNC: 9.3 MG/DL (ref 8.3–10.3)
CHLORIDE: 105 MMOL/L (ref 101–111)
CO2: 25 MMOL/L (ref 22–32)
CREAT BLD-MCNC: 0.74 MG/DL (ref 0.7–1.3)
EOSINOPHIL # BLD AUTO: 0.1 X10(3) UL (ref 0–0.3)
EOSINOPHIL NFR BLD AUTO: 2.4 %
ERYTHROCYTE [DISTWIDTH] IN BLOOD BY AUTOMATED COUNT: 13.2 % (ref 11.5–16)
GLUCOSE BLD-MCNC: 96 MG/DL (ref 70–99)
HCT VFR BLD AUTO: 43.2 % (ref 37–53)
HGB BLD-MCNC: 14 G/DL (ref 13–17)
IMMATURE GRANULOCYTE COUNT: 0.01 X10(3) UL (ref 0–1)
IMMATURE GRANULOCYTE RATIO %: 0.2 %
LYMPHOCYTES # BLD AUTO: 0.81 X10(3) UL (ref 0.9–4)
LYMPHOCYTES NFR BLD AUTO: 19.1 %
M PROTEIN MFR SERPL ELPH: 7.1 G/DL (ref 6.1–8.3)
MCH RBC QN AUTO: 32 PG (ref 27–33.2)
MCHC RBC AUTO-ENTMCNC: 32.4 G/DL (ref 31–37)
MCV RBC AUTO: 98.6 FL (ref 80–99)
MONOCYTES # BLD AUTO: 0.76 X10(3) UL (ref 0.1–0.6)
MONOCYTES NFR BLD AUTO: 17.9 %
NEUTROPHIL ABS PRELIM: 2.55 X10 (3) UL (ref 1.3–6.7)
NEUTROPHILS # BLD AUTO: 2.55 X10(3) UL (ref 1.3–6.7)
NEUTROPHILS NFR BLD AUTO: 59.9 %
PLATELET # BLD AUTO: 410 10(3)UL (ref 150–450)
POTASSIUM SERPL-SCNC: 4.7 MMOL/L (ref 3.6–5.1)
PSA SERPL-MCNC: <0.01 NG/ML (ref 0.01–4)
RBC # BLD AUTO: 4.38 X10(6)UL (ref 3.8–5.8)
RED CELL DISTRIBUTION WIDTH-SD: 48.1 FL (ref 35.1–46.3)
SODIUM SERPL-SCNC: 138 MMOL/L (ref 136–144)
WBC # BLD AUTO: 4.3 X10(3) UL (ref 4–13)

## 2017-08-04 PROCEDURE — 99213 OFFICE O/P EST LOW 20 MIN: CPT | Performed by: SPECIALIST

## 2017-08-04 RX ORDER — MELATONIN
1000 DAILY
COMMUNITY
End: 2020-06-30

## 2017-08-04 NOTE — PROGRESS NOTES
Patient is here today for follow up with Dr. Rehan Wagner Thrombocytosis. Patient denies pain. Stated he is feeling good. Medication list and medical history were reviewed and updated.     Education Record    Learner:  Patient and Spouse    Disease / Diagnosis:

## 2017-08-16 DIAGNOSIS — E03.9 ACQUIRED HYPOTHYROIDISM: ICD-10-CM

## 2017-08-16 RX ORDER — LEVOTHYROXINE SODIUM 112 UG/1
TABLET ORAL
Qty: 90 TABLET | Refills: 0 | Status: SHIPPED | OUTPATIENT
Start: 2017-08-16 | End: 2017-12-05

## 2017-08-16 NOTE — TELEPHONE ENCOUNTER
Refill as per protocol. LOV 5/26/2017    No future appointments.        Signed Prescriptions Disp Refills    LEVOTHYROXINE SODIUM 112 MCG Oral Tab 90 tablet 0      Sig: TAKE 1 TABLET(112 MCG) BY MOUTH BEFORE BREAKFAST        Authorizing Provider: Roni Dodson,

## 2017-12-05 ENCOUNTER — OFFICE VISIT (OUTPATIENT)
Dept: FAMILY MEDICINE CLINIC | Facility: CLINIC | Age: 72
End: 2017-12-05

## 2017-12-05 VITALS
BODY MASS INDEX: 24.88 KG/M2 | SYSTOLIC BLOOD PRESSURE: 122 MMHG | TEMPERATURE: 98 F | HEART RATE: 84 BPM | WEIGHT: 154.81 LBS | HEIGHT: 65.95 IN | OXYGEN SATURATION: 98 % | RESPIRATION RATE: 18 BRPM | DIASTOLIC BLOOD PRESSURE: 62 MMHG

## 2017-12-05 DIAGNOSIS — I10 ESSENTIAL HYPERTENSION: ICD-10-CM

## 2017-12-05 DIAGNOSIS — Z51.81 ENCOUNTER FOR MEDICATION MONITORING: ICD-10-CM

## 2017-12-05 DIAGNOSIS — E78.5 HYPERLIPIDEMIA, UNSPECIFIED HYPERLIPIDEMIA TYPE: ICD-10-CM

## 2017-12-05 DIAGNOSIS — C61 PROSTATE CANCER (HCC): ICD-10-CM

## 2017-12-05 DIAGNOSIS — E55.9 VITAMIN D DEFICIENCY: ICD-10-CM

## 2017-12-05 DIAGNOSIS — E03.9 ACQUIRED HYPOTHYROIDISM: ICD-10-CM

## 2017-12-05 DIAGNOSIS — E03.9 ACQUIRED HYPOTHYROIDISM: Primary | ICD-10-CM

## 2017-12-05 PROCEDURE — 99214 OFFICE O/P EST MOD 30 MIN: CPT | Performed by: FAMILY MEDICINE

## 2017-12-05 RX ORDER — LEVOTHYROXINE SODIUM 112 UG/1
TABLET ORAL
Qty: 90 TABLET | Refills: 0 | Status: SHIPPED | OUTPATIENT
Start: 2017-12-05 | End: 2018-03-15

## 2017-12-05 NOTE — PROGRESS NOTES
Thu Holbrook is a 73year old male. HPI:  Patient is here for follow-up on his medications. Is also due for blood work. Feeling fine on his current dose of levothyroxine. No unusual fatigue. Weight stable. Normal bowel movements.   Taking a daily History:  Smoking status: Former Smoker                                                              Packs/day: 0.50      Years: 50.00        Quit date: 5/15/2015  Smokeless tobacco: Former User                        Quit date: 7/1/2015  Alcohol use:  Yes BASIC METABOLIC PANEL (8); Future  - TSH+FREE T4; Future    6. Prostate cancer ([facility] [address]  Last PSA is undetectable. Had follow-up with oncologist in August 2017. Continue active surveillance.

## 2017-12-06 ENCOUNTER — APPOINTMENT (OUTPATIENT)
Dept: LAB | Age: 72
End: 2017-12-06
Attending: FAMILY MEDICINE
Payer: MEDICARE

## 2017-12-06 DIAGNOSIS — E55.9 VITAMIN D DEFICIENCY: ICD-10-CM

## 2017-12-06 DIAGNOSIS — Z51.81 ENCOUNTER FOR MEDICATION MONITORING: ICD-10-CM

## 2017-12-06 DIAGNOSIS — E03.9 ACQUIRED HYPOTHYROIDISM: ICD-10-CM

## 2017-12-06 DIAGNOSIS — E78.5 HYPERLIPIDEMIA, UNSPECIFIED HYPERLIPIDEMIA TYPE: ICD-10-CM

## 2017-12-06 DIAGNOSIS — I10 ESSENTIAL HYPERTENSION: ICD-10-CM

## 2017-12-06 PROCEDURE — 80048 BASIC METABOLIC PNL TOTAL CA: CPT | Performed by: FAMILY MEDICINE

## 2017-12-06 PROCEDURE — 84443 ASSAY THYROID STIM HORMONE: CPT | Performed by: FAMILY MEDICINE

## 2017-12-06 PROCEDURE — 84439 ASSAY OF FREE THYROXINE: CPT | Performed by: FAMILY MEDICINE

## 2017-12-06 PROCEDURE — 80061 LIPID PANEL: CPT | Performed by: FAMILY MEDICINE

## 2017-12-06 PROCEDURE — 82306 VITAMIN D 25 HYDROXY: CPT | Performed by: FAMILY MEDICINE

## 2017-12-06 PROCEDURE — 36415 COLL VENOUS BLD VENIPUNCTURE: CPT | Performed by: FAMILY MEDICINE

## 2017-12-31 DIAGNOSIS — I10 ESSENTIAL HYPERTENSION: ICD-10-CM

## 2018-01-02 RX ORDER — LISINOPRIL 10 MG/1
TABLET ORAL
Qty: 90 TABLET | Refills: 0 | Status: SHIPPED | OUTPATIENT
Start: 2018-01-02 | End: 2018-07-25

## 2018-01-04 ENCOUNTER — TELEPHONE (OUTPATIENT)
Dept: FAMILY MEDICINE CLINIC | Facility: CLINIC | Age: 73
End: 2018-01-04

## 2018-01-04 DIAGNOSIS — E03.9 ACQUIRED HYPOTHYROIDISM: Primary | ICD-10-CM

## 2018-01-04 NOTE — TELEPHONE ENCOUNTER
----- Message from Hood Hines MD sent at 1/3/2018  8:40 PM CST -----  Please notify patient labs with normal fasting blood sugar. Lipid panel with improved cholesterol level versus previous. Excellent HDL. LDL okay at 120.   Vitamin D level is now

## 2018-02-07 NOTE — PROGRESS NOTES
[facility] Progress Note      Patient Name: Eli Harrell   YOB: 1945  Medical Record Number: [id number]  Attending Physician: Vamshi Funez. Frandy Head M.D.        Date of Visit: 2/8/2018      Chief Complaint  Thrombocytosis, prostate ca prostate cancer. Social History (historical data, reviewed)  Previous tobacco use but quit 2015; uses alcohol daily.       Current Medications     LISINOPRIL 10 MG Oral Tab TAKE 1 TABLET(10 MG) BY MOUTH EVERY DAY Disp: 90 tablet Rfl: 0   LEVOTHYROXINE SO Clear to auscultation bilaterally. Cardiovascular  Regular rate and rhythm; normal S1S2. Abdomen  Soft; nontender; no masses; no hepatosplenomegaly. Extremities  No lower extremity edema.    Neurologic           Alert and oriented x 3; motor and sensory

## 2018-02-08 ENCOUNTER — OFFICE VISIT (OUTPATIENT)
Dept: HEMATOLOGY/ONCOLOGY | Facility: HOSPITAL | Age: 73
End: 2018-02-08
Attending: SPECIALIST
Payer: MEDICARE

## 2018-02-08 ENCOUNTER — TELEPHONE (OUTPATIENT)
Dept: HEMATOLOGY/ONCOLOGY | Facility: HOSPITAL | Age: 73
End: 2018-02-08

## 2018-02-08 VITALS
HEIGHT: 65.95 IN | OXYGEN SATURATION: 93 % | HEART RATE: 73 BPM | WEIGHT: 156.5 LBS | RESPIRATION RATE: 18 BRPM | BODY MASS INDEX: 25.15 KG/M2 | SYSTOLIC BLOOD PRESSURE: 115 MMHG | DIASTOLIC BLOOD PRESSURE: 75 MMHG | TEMPERATURE: 96 F

## 2018-02-08 DIAGNOSIS — C61 PROSTATE CANCER (HCC): Primary | ICD-10-CM

## 2018-02-08 LAB — PSA SERPL-MCNC: <0.01 NG/ML (ref 0.01–4)

## 2018-02-08 PROCEDURE — 99213 OFFICE O/P EST LOW 20 MIN: CPT | Performed by: SPECIALIST

## 2018-02-08 NOTE — TELEPHONE ENCOUNTER
Harvey Duane, MD Lizette Members, RN             Let him know PSA is undetectable. You can leave message on his voicemail. Patient notified. Stated understanding. Instructed to call as needed.

## 2018-02-08 NOTE — PROGRESS NOTES
Patient is here today for follow up with Omid Hartley for Thrombocytosis. Patient denies pain. Stated he is feeling good. Medication list, and medical history were reviewed and udpated.     Education Record    Learner:  Patient and Spouse    Disease / Niya Albrecht

## 2018-03-15 DIAGNOSIS — E03.9 ACQUIRED HYPOTHYROIDISM: ICD-10-CM

## 2018-03-15 RX ORDER — LEVOTHYROXINE SODIUM 112 UG/1
TABLET ORAL
Qty: 30 TABLET | Refills: 0 | Status: SHIPPED | OUTPATIENT
Start: 2018-03-15 | End: 2018-04-09 | Stop reason: DRUGHIGH

## 2018-03-15 NOTE — TELEPHONE ENCOUNTER
Thyroid:    Lab Results  Component Value Date   TSH 5.860 (H) 12/06/2017   TSH 3.470 06/02/2017   TSH 0.853 10/05/2016   T4F 1.3 12/06/2017   T4F 1.6 10/05/2016   T4F 1.7 04/06/2016     No future appointments.   Last office visit 12/5/2018  Please advice

## 2018-03-16 NOTE — TELEPHONE ENCOUNTER
1 month supply sent to pharmacy. Patient is due to get his thyroid function tests done. Lab order has already been entered. Please notify patient to get blood work done in next 1-2 weeks  (no fasting needed).   May need to adjust medication based on lab

## 2018-03-19 NOTE — TELEPHONE ENCOUNTER
Spoke with pt, advised that he had already called back about this message    Will get his labs done this week

## 2018-03-20 ENCOUNTER — APPOINTMENT (OUTPATIENT)
Dept: LAB | Age: 73
End: 2018-03-20
Attending: FAMILY MEDICINE
Payer: MEDICARE

## 2018-03-20 DIAGNOSIS — E03.9 ACQUIRED HYPOTHYROIDISM: ICD-10-CM

## 2018-03-20 LAB
FREE T4: 0.7 NG/DL (ref 0.9–1.8)
TSI SER-ACNC: 15.5 MIU/ML (ref 0.35–5.5)

## 2018-03-20 PROCEDURE — 84439 ASSAY OF FREE THYROXINE: CPT | Performed by: FAMILY MEDICINE

## 2018-03-20 PROCEDURE — 84443 ASSAY THYROID STIM HORMONE: CPT | Performed by: FAMILY MEDICINE

## 2018-03-20 PROCEDURE — 36415 COLL VENOUS BLD VENIPUNCTURE: CPT | Performed by: FAMILY MEDICINE

## 2018-03-21 ENCOUNTER — OFFICE VISIT (OUTPATIENT)
Dept: FAMILY MEDICINE CLINIC | Facility: CLINIC | Age: 73
End: 2018-03-21

## 2018-03-21 VITALS
RESPIRATION RATE: 18 BRPM | OXYGEN SATURATION: 98 % | WEIGHT: 158.63 LBS | TEMPERATURE: 97 F | BODY MASS INDEX: 25.49 KG/M2 | DIASTOLIC BLOOD PRESSURE: 78 MMHG | HEART RATE: 68 BPM | SYSTOLIC BLOOD PRESSURE: 126 MMHG | HEIGHT: 65.95 IN

## 2018-03-21 DIAGNOSIS — L98.9 LESION OF SKIN OF FACE: ICD-10-CM

## 2018-03-21 DIAGNOSIS — I70.0 ATHEROSCLEROSIS OF AORTA (HCC): ICD-10-CM

## 2018-03-21 DIAGNOSIS — D75.839 THROMBOCYTOSIS: ICD-10-CM

## 2018-03-21 DIAGNOSIS — E03.9 ACQUIRED HYPOTHYROIDISM: Primary | ICD-10-CM

## 2018-03-21 DIAGNOSIS — I10 ESSENTIAL HYPERTENSION: ICD-10-CM

## 2018-03-21 PROCEDURE — 99213 OFFICE O/P EST LOW 20 MIN: CPT | Performed by: FAMILY MEDICINE

## 2018-03-21 RX ORDER — LEVOTHYROXINE SODIUM 0.12 MG/1
125 TABLET ORAL
Qty: 90 TABLET | Refills: 0 | Status: SHIPPED | OUTPATIENT
Start: 2018-03-21 | End: 2018-07-02

## 2018-03-21 NOTE — PROGRESS NOTES
Marshal Duke is a 81year old male. HPI:  Pt is here for f/u on test results. Missed about 2-3 doses of levothyroxine prior to blood work as ran out of refills. Feeling fine overall. Denies any unusual fatigue. Weight stable.   No neck pain, no dy excision left jaw  1953: TONSILLECTOMY  No date: VASECTOMY    Social History:  Smoking status: Former Smoker                                                              Packs/day: 0.50      Years: 50.00        Quit date: 5/15/2015  Smokeless tobacco: Form benign  Last plt ct normal.   Monitor    4. Essential hypertension  Stable cpm.     5. Atherosclerosis of aorta (HCC)  Continue aspirin daily. Lipids ok w/ great HDL. , goal <100. Monitor w/ diet/exercise.

## 2018-04-04 ENCOUNTER — TELEPHONE (OUTPATIENT)
Dept: FAMILY MEDICINE CLINIC | Facility: CLINIC | Age: 73
End: 2018-04-04

## 2018-04-09 ENCOUNTER — OFFICE VISIT (OUTPATIENT)
Dept: FAMILY MEDICINE CLINIC | Facility: CLINIC | Age: 73
End: 2018-04-09

## 2018-04-09 VITALS
RESPIRATION RATE: 16 BRPM | TEMPERATURE: 98 F | SYSTOLIC BLOOD PRESSURE: 110 MMHG | DIASTOLIC BLOOD PRESSURE: 62 MMHG | HEIGHT: 65.95 IN | OXYGEN SATURATION: 96 % | BODY MASS INDEX: 24.99 KG/M2 | WEIGHT: 155.5 LBS | HEART RATE: 88 BPM

## 2018-04-09 DIAGNOSIS — M54.50 ACUTE RIGHT-SIDED LOW BACK PAIN WITHOUT SCIATICA: Primary | ICD-10-CM

## 2018-04-09 PROCEDURE — 99214 OFFICE O/P EST MOD 30 MIN: CPT | Performed by: FAMILY MEDICINE

## 2018-04-09 RX ORDER — CYCLOBENZAPRINE HCL 10 MG
10 TABLET ORAL NIGHTLY
Qty: 7 TABLET | Refills: 0 | Status: SHIPPED | OUTPATIENT
Start: 2018-04-09 | End: 2018-04-16

## 2018-04-09 RX ORDER — IBUPROFEN 600 MG/1
600 TABLET ORAL EVERY 6 HOURS PRN
Qty: 30 TABLET | Refills: 0 | Status: SHIPPED | OUTPATIENT
Start: 2018-04-09 | End: 2019-04-16 | Stop reason: ALTCHOICE

## 2018-04-10 NOTE — PROGRESS NOTES
/62   Pulse 88   Temp 98.2 °F (36.8 °C) (Oral)   Resp 16   Ht 65.95\"   Wt 155 lb 8 oz   SpO2 96%   BMI 25.14 kg/m²               Patient presents with:  Low Back Pain       HPI;    Shruthi Landeros is a 81year old male.   Comes here today complainin by mouth daily. Disp:  Rfl:    Cholecalciferol (VITAMIN D3) 1000 UNITS Oral Cap Take 1 tablet by mouth daily.  Disp:  Rfl:       Past Medical History:   Diagnosis Date   • Cancer [facility])     Prostate   • Essential hypertension    • Hyperlipidemia    • Hypothy touch is intact bilaterally. Motor:  No arm or leg weakness noted. Finger-to-nose coordination is intact.   Gait deferred  ASSESSMENT AND PLAN:   Diagnoses and all orders for this visit:    Acute right-sided low back pain without sciatica  Moist heat to b 12/21/1997  Influenza Vaccine(1) due on 09/01/2017  Colonoscopy,10 Years due on 01/01/2018  Fall Risk Screening due on 05/26/2018  Annual Physical due on 05/26/2018  Annual Depression Screen due on 08/04/2018  PSA due on 02/08/2020  Pneumococcal PPSV23/PCV

## 2018-04-11 ENCOUNTER — MED REC SCAN ONLY (OUTPATIENT)
Dept: FAMILY MEDICINE CLINIC | Facility: CLINIC | Age: 73
End: 2018-04-11

## 2018-04-12 ENCOUNTER — TELEPHONE (OUTPATIENT)
Dept: FAMILY MEDICINE CLINIC | Facility: CLINIC | Age: 73
End: 2018-04-12

## 2018-04-12 NOTE — TELEPHONE ENCOUNTER
Pt's wife called asking for Dr Keli Andre to write a letter to the [employer] Fax # [phone]. They need to confirm It is ok to see him, confirming it's ok to give him medication if needed due to his last two surgeries.  (He [facility] to [facility]

## 2018-04-13 NOTE — TELEPHONE ENCOUNTER
Patient in office today with his wife.   Clarified request.  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  Letter done and told pt to have them send reques

## 2018-05-23 ENCOUNTER — APPOINTMENT (OUTPATIENT)
Dept: LAB | Age: 73
End: 2018-05-23
Attending: FAMILY MEDICINE
Payer: MEDICARE

## 2018-05-23 DIAGNOSIS — E03.9 ACQUIRED HYPOTHYROIDISM: ICD-10-CM

## 2018-05-23 PROCEDURE — 84443 ASSAY THYROID STIM HORMONE: CPT

## 2018-05-23 PROCEDURE — 36415 COLL VENOUS BLD VENIPUNCTURE: CPT

## 2018-05-23 PROCEDURE — 84439 ASSAY OF FREE THYROXINE: CPT

## 2018-05-30 ENCOUNTER — OFFICE VISIT (OUTPATIENT)
Dept: FAMILY MEDICINE CLINIC | Facility: CLINIC | Age: 73
End: 2018-05-30

## 2018-05-30 VITALS
SYSTOLIC BLOOD PRESSURE: 122 MMHG | DIASTOLIC BLOOD PRESSURE: 72 MMHG | RESPIRATION RATE: 18 BRPM | WEIGHT: 151.81 LBS | HEART RATE: 72 BPM | BODY MASS INDEX: 24.4 KG/M2 | HEIGHT: 65.95 IN | OXYGEN SATURATION: 98 % | TEMPERATURE: 98 F

## 2018-05-30 DIAGNOSIS — L98.9 LESION OF SKIN OF FACE: ICD-10-CM

## 2018-05-30 DIAGNOSIS — Z13.5 SCREENING FOR GLAUCOMA: ICD-10-CM

## 2018-05-30 DIAGNOSIS — Z87.891 HISTORY OF TOBACCO USE: ICD-10-CM

## 2018-05-30 DIAGNOSIS — Z13.6 SCREENING FOR AAA (ABDOMINAL AORTIC ANEURYSM): ICD-10-CM

## 2018-05-30 DIAGNOSIS — D75.89 MACROCYTOSIS WITHOUT ANEMIA: ICD-10-CM

## 2018-05-30 DIAGNOSIS — H91.93 BILATERAL HEARING LOSS, UNSPECIFIED HEARING LOSS TYPE: ICD-10-CM

## 2018-05-30 DIAGNOSIS — I70.0 ATHEROSCLEROSIS OF AORTA (HCC): ICD-10-CM

## 2018-05-30 DIAGNOSIS — D75.839 THROMBOCYTOSIS: ICD-10-CM

## 2018-05-30 DIAGNOSIS — C61 PROSTATE CANCER (HCC): ICD-10-CM

## 2018-05-30 DIAGNOSIS — Z01.00 ROUTINE EYE EXAM: ICD-10-CM

## 2018-05-30 DIAGNOSIS — E78.5 HYPERLIPIDEMIA, UNSPECIFIED HYPERLIPIDEMIA TYPE: ICD-10-CM

## 2018-05-30 DIAGNOSIS — I10 ESSENTIAL HYPERTENSION: ICD-10-CM

## 2018-05-30 DIAGNOSIS — E03.9 ACQUIRED HYPOTHYROIDISM: ICD-10-CM

## 2018-05-30 DIAGNOSIS — Z11.59 NEED FOR HEPATITIS C SCREENING TEST: ICD-10-CM

## 2018-05-30 DIAGNOSIS — R42 DIZZINESS: ICD-10-CM

## 2018-05-30 DIAGNOSIS — M47.896 OTHER OSTEOARTHRITIS OF SPINE, LUMBAR REGION: ICD-10-CM

## 2018-05-30 DIAGNOSIS — Z00.00 ENCOUNTER FOR ANNUAL HEALTH EXAMINATION: Primary | ICD-10-CM

## 2018-05-30 PROCEDURE — G0439 PPPS, SUBSEQ VISIT: HCPCS | Performed by: FAMILY MEDICINE

## 2018-05-30 PROCEDURE — 96160 PT-FOCUSED HLTH RISK ASSMT: CPT | Performed by: FAMILY MEDICINE

## 2018-05-30 PROCEDURE — 99397 PER PM REEVAL EST PAT 65+ YR: CPT | Performed by: FAMILY MEDICINE

## 2018-05-30 NOTE — PATIENT INSTRUCTIONS
Samuel Romero's SCREENING SCHEDULE   Tests on this list are recommended by your physician but may not be covered, or covered at this frequency, by your insurer. Please check with your insurance carrier before scheduling to verify coverage.     Cassandra Chin Covered up to Age 76     Colonoscopy Screen   Covered every 10 years- more often if abnormal Colonoscopy,10 Years due on 01/01/2018 Update Health Maintenance if applicable    Flex Sigmoidoscopy Screen  Covered every 5 years No results found for this or any Medicare Part B) No orders found for this or any previous visit.  This may be covered with your prescription benefits, but Medicare does not cover unless Medically needed    Zoster (Not covered by Medicare Part B) No orders found for this or any previous vi

## 2018-05-30 NOTE — PROGRESS NOTES
HPI:   Lupis Campbell is a 81year old male who presents for a MA (Medicare Advantage) [facility] [facility] (Once per calendar year). Pt is here with is wife  Pt feeling fine on current levothyroxine dose and takes daily w/o misses doses.    Notes some dizziness Epic.           He smoked tobacco in the past but quit greater than 12 months ago.   Smoking status: Former Smoker                                                              Packs/day: 0.50      Years: 50.00        Quit date: 5/15/2015  Smokeless tobacco: B-12 1000 MCG Oral Tab Take 1,000 mcg by mouth daily. aspirin 81 MG Oral Tab Take 81 mg by mouth daily. BIOTIN OR Take by mouth daily. FOLIC ACID OR Take by mouth daily. Cholecalciferol (VITAMIN D3) 1000 UNITS Oral Cap Take 1 tablet by mouth daily. Resp 18   Ht 65.95\"   Wt 151 lb 12.8 oz   SpO2 98%   BMI 24.54 kg/m²   Estimated body mass index is 24.54 kg/m² as calculated from the following:    Height as of this encounter: 65.95\". Weight as of this encounter: 151 lb 12.8 oz.     Medicare Domi and oriented ×3, no focal deficits. Normal gait.             Vaccination History     Immunization History   Administered Date(s) Administered   • DTAP 04/08/2008   • Pneumococcal (Prevnar 13) 05/17/2016   • Pneumovax 23 05/26/2017   • Tb Intradermal Test 1 Future    Dizziness  Notes only with lying down at night for a few seconds. Possible positional vertigo. Keep well-hydrated. Stopping blood pressure medication as above may help. Monitor.   Call if sxs persist or worsen    Screening for AAA (abdominal a Interaction    This section provided for quick review of chart, separate sheet to patient  [address] Internal Lab or Procedure External Lab or Procedure   Diabetes Screening      HbgA1C   Annually No results found for: covered by Medicare Part B) No vaccine history found This may be covered with your prescription benefits, but Medicare does not cover unless Medically needed    Zoster   Not covered by Medicare Part B  7/13/17 This may be covered with your pharmacy  prescr

## 2018-07-02 RX ORDER — LEVOTHYROXINE SODIUM 0.12 MG/1
TABLET ORAL
Qty: 90 TABLET | Refills: 0 | Status: SHIPPED | OUTPATIENT
Start: 2018-07-02 | End: 2018-10-01

## 2018-07-02 NOTE — TELEPHONE ENCOUNTER
LOV 5/4/18    Last lab 5/23/18    Last rx 3/21/18 #90 with 0 refills    Protocol pass    Future Appointments  Date Time Provider Geoff Zamora   7/25/2018 10:00 AM Eddie Dee MD [facility]

## 2018-07-19 ENCOUNTER — LAB ENCOUNTER (OUTPATIENT)
Dept: LAB | Age: 73
End: 2018-07-19
Attending: FAMILY MEDICINE
Payer: MEDICARE

## 2018-07-19 DIAGNOSIS — D75.839 THROMBOCYTOSIS: ICD-10-CM

## 2018-07-19 DIAGNOSIS — E03.9 ACQUIRED HYPOTHYROIDISM: ICD-10-CM

## 2018-07-19 DIAGNOSIS — I10 ESSENTIAL HYPERTENSION: ICD-10-CM

## 2018-07-19 DIAGNOSIS — D75.89 MACROCYTOSIS WITHOUT ANEMIA: ICD-10-CM

## 2018-07-19 DIAGNOSIS — E78.5 HYPERLIPIDEMIA, UNSPECIFIED HYPERLIPIDEMIA TYPE: ICD-10-CM

## 2018-07-19 DIAGNOSIS — Z11.59 NEED FOR HEPATITIS C SCREENING TEST: ICD-10-CM

## 2018-07-19 LAB
ALBUMIN SERPL-MCNC: 3.8 G/DL (ref 3.5–4.8)
ALBUMIN/GLOB SERPL: 1.1 {RATIO} (ref 1–2)
ALP LIVER SERPL-CCNC: 76 U/L (ref 45–117)
ALT SERPL-CCNC: 20 U/L (ref 17–63)
ANION GAP SERPL CALC-SCNC: 10 MMOL/L (ref 0–18)
AST SERPL-CCNC: 12 U/L (ref 15–41)
BASOPHILS # BLD AUTO: 0.02 X10(3) UL (ref 0–0.1)
BASOPHILS NFR BLD AUTO: 0.4 %
BILIRUB SERPL-MCNC: 0.6 MG/DL (ref 0.1–2)
BUN BLD-MCNC: 17 MG/DL (ref 8–20)
BUN/CREAT SERPL: 18.3 (ref 10–20)
CALCIUM BLD-MCNC: 9.5 MG/DL (ref 8.3–10.3)
CHLORIDE SERPL-SCNC: 108 MMOL/L (ref 101–111)
CO2 SERPL-SCNC: 23 MMOL/L (ref 22–32)
CREAT BLD-MCNC: 0.93 MG/DL (ref 0.7–1.3)
EOSINOPHIL # BLD AUTO: 0.07 X10(3) UL (ref 0–0.3)
EOSINOPHIL NFR BLD AUTO: 1.4 %
ERYTHROCYTE [DISTWIDTH] IN BLOOD BY AUTOMATED COUNT: 12.5 % (ref 11.5–16)
GLOBULIN PLAS-MCNC: 3.5 G/DL (ref 2.5–3.7)
GLUCOSE BLD-MCNC: 114 MG/DL (ref 70–99)
HCT VFR BLD AUTO: 45.1 % (ref 37–53)
HCV AB SERPL QL IA: NONREACTIVE
HGB BLD-MCNC: 14.7 G/DL (ref 13–17)
IMMATURE GRANULOCYTE COUNT: 0.01 X10(3) UL (ref 0–1)
IMMATURE GRANULOCYTE RATIO %: 0.2 %
LYMPHOCYTES # BLD AUTO: 1.03 X10(3) UL (ref 0.9–4)
LYMPHOCYTES NFR BLD AUTO: 21.2 %
M PROTEIN MFR SERPL ELPH: 7.3 G/DL (ref 6.1–8.3)
MCH RBC QN AUTO: 33 PG (ref 27–33.2)
MCHC RBC AUTO-ENTMCNC: 32.6 G/DL (ref 31–37)
MCV RBC AUTO: 101.3 FL (ref 80–99)
MONOCYTES # BLD AUTO: 0.62 X10(3) UL (ref 0.1–1)
MONOCYTES NFR BLD AUTO: 12.8 %
NEUTROPHIL ABS PRELIM: 3.11 X10 (3) UL (ref 1.3–6.7)
NEUTROPHILS # BLD AUTO: 3.11 X10(3) UL (ref 1.3–6.7)
NEUTROPHILS NFR BLD AUTO: 64 %
OSMOLALITY SERPL CALC.SUM OF ELEC: 294 MOSM/KG (ref 275–295)
PLATELET # BLD AUTO: 325 10(3)UL (ref 150–450)
POTASSIUM SERPL-SCNC: 3.9 MMOL/L (ref 3.6–5.1)
RBC # BLD AUTO: 4.45 X10(6)UL (ref 3.8–5.8)
RED CELL DISTRIBUTION WIDTH-SD: 47.4 FL (ref 35.1–46.3)
SODIUM SERPL-SCNC: 141 MMOL/L (ref 136–144)
T4 FREE SERPL-MCNC: 1.2 NG/DL (ref 0.9–1.8)
TSI SER-ACNC: 4.35 MIU/ML (ref 0.35–5.5)
WBC # BLD AUTO: 4.9 X10(3) UL (ref 4–13)

## 2018-07-19 PROCEDURE — 86803 HEPATITIS C AB TEST: CPT

## 2018-07-19 PROCEDURE — 36415 COLL VENOUS BLD VENIPUNCTURE: CPT

## 2018-07-19 PROCEDURE — 84439 ASSAY OF FREE THYROXINE: CPT

## 2018-07-19 PROCEDURE — 85025 COMPLETE CBC W/AUTO DIFF WBC: CPT

## 2018-07-19 PROCEDURE — 80053 COMPREHEN METABOLIC PANEL: CPT

## 2018-07-19 PROCEDURE — 84443 ASSAY THYROID STIM HORMONE: CPT

## 2018-07-25 ENCOUNTER — OFFICE VISIT (OUTPATIENT)
Dept: FAMILY MEDICINE CLINIC | Facility: CLINIC | Age: 73
End: 2018-07-25
Payer: COMMERCIAL

## 2018-07-25 VITALS
SYSTOLIC BLOOD PRESSURE: 128 MMHG | RESPIRATION RATE: 18 BRPM | OXYGEN SATURATION: 98 % | TEMPERATURE: 97 F | BODY MASS INDEX: 25.36 KG/M2 | WEIGHT: 157.81 LBS | HEART RATE: 80 BPM | DIASTOLIC BLOOD PRESSURE: 84 MMHG | HEIGHT: 65.95 IN

## 2018-07-25 DIAGNOSIS — D75.89 MACROCYTOSIS WITHOUT ANEMIA: ICD-10-CM

## 2018-07-25 DIAGNOSIS — I10 ESSENTIAL HYPERTENSION: Primary | ICD-10-CM

## 2018-07-25 DIAGNOSIS — E03.9 ACQUIRED HYPOTHYROIDISM: ICD-10-CM

## 2018-07-25 PROCEDURE — 99213 OFFICE O/P EST LOW 20 MIN: CPT | Performed by: FAMILY MEDICINE

## 2018-07-25 RX ORDER — LISINOPRIL 5 MG/1
5 TABLET ORAL DAILY
Qty: 90 TABLET | Refills: 1 | Status: SHIPPED | OUTPATIENT
Start: 2018-07-25 | End: 2018-10-01

## 2018-07-25 NOTE — PROGRESS NOTES
Karolina Doss is a 81year old male. HPI:  Pt is here for f/u on BP and medication  Patient has been off of his lisinopril now for about 2 months. Prior to that was not consistent with taking medication daily. States he is feeling well.   Has not done VASECTOMY    Social History:  Smoking status: Former Smoker                                                              Packs/day: 0.50      Years: 50.00        Quit date: 5/15/2015  Smokeless tobacco: Former User                        Quit date: 7/1/201 compliance in a.m. Avoid any vitamins or supplements at least for 6 hours after thyroid medication dose. 3. Macrocytosis without anemia  Noted elevated M CV again. Clinically related to alcohol intake.   When patient had stopped alcohol consumption alt

## 2018-08-08 ENCOUNTER — TELEPHONE (OUTPATIENT)
Dept: FAMILY MEDICINE CLINIC | Facility: CLINIC | Age: 73
End: 2018-08-08

## 2018-08-08 RX ORDER — SILDENAFIL CITRATE 20 MG/1
TABLET ORAL AS NEEDED
Qty: 30 TABLET | Refills: 2 | Status: SHIPPED | OUTPATIENT
Start: 2018-08-08 | End: 2018-08-29

## 2018-08-09 NOTE — TELEPHONE ENCOUNTER
Patient in office with his wife. Requesting refill for sildenafil 20 mg tablets. Was prescribed these in the past per urologist and would like refill.   States did not help much with ED when first prescribed, but has not tried for a while and would like t

## 2018-08-29 ENCOUNTER — TELEPHONE (OUTPATIENT)
Dept: FAMILY MEDICINE CLINIC | Facility: CLINIC | Age: 73
End: 2018-08-29

## 2018-08-29 RX ORDER — SILDENAFIL CITRATE 20 MG/1
TABLET ORAL AS NEEDED
Qty: 30 TABLET | Refills: 2 | Status: SHIPPED | OUTPATIENT
Start: 2018-08-29 | End: 2019-08-29

## 2018-08-30 NOTE — TELEPHONE ENCOUNTER
Patient requesting prescription for sildenafil to be sent to [facility], as cost will be less for him there.   Rx sent

## 2018-10-01 DIAGNOSIS — I10 ESSENTIAL HYPERTENSION: ICD-10-CM

## 2018-10-01 RX ORDER — LISINOPRIL 5 MG/1
5 TABLET ORAL DAILY
Qty: 30 TABLET | Refills: 0 | Status: SHIPPED | OUTPATIENT
Start: 2018-10-01 | End: 2019-04-16

## 2018-10-01 RX ORDER — LEVOTHYROXINE SODIUM 0.12 MG/1
TABLET ORAL
Qty: 30 TABLET | Refills: 0 | Status: SHIPPED | OUTPATIENT
Start: 2018-10-01 | End: 2018-11-20

## 2018-10-01 NOTE — TELEPHONE ENCOUNTER
Levothyroxine Sodium 125 MCG Oral Tab         Sig: TAKE ONE TABLET BY MOUTH BEFORE BREAKFAST    Disp:  90 tablet    Refills:  0    Start: 10/1/2018     Class: Normal    Non-formulary    Last ordered: 3 months ago by Maureen Salazar MD     Thyroid Supplem

## 2018-10-01 NOTE — TELEPHONE ENCOUNTER
Patients wife called and requested that patient has a weeks worth supply of his lisinopril and levothyroxine sent to [facility] file in [place], [place], as he is out of town there and won't be back for another week

## 2018-10-19 ENCOUNTER — OFFICE VISIT (OUTPATIENT)
Dept: HEMATOLOGY/ONCOLOGY | Facility: HOSPITAL | Age: 73
End: 2018-10-19
Attending: SPECIALIST
Payer: MEDICARE

## 2018-10-19 VITALS
HEIGHT: 65.95 IN | SYSTOLIC BLOOD PRESSURE: 122 MMHG | TEMPERATURE: 97 F | RESPIRATION RATE: 18 BRPM | BODY MASS INDEX: 25.2 KG/M2 | WEIGHT: 156.81 LBS | HEART RATE: 76 BPM | DIASTOLIC BLOOD PRESSURE: 83 MMHG | OXYGEN SATURATION: 100 %

## 2018-10-19 DIAGNOSIS — C61 PROSTATE CANCER (HCC): Primary | ICD-10-CM

## 2018-10-19 DIAGNOSIS — D75.89 MACROCYTOSIS: ICD-10-CM

## 2018-10-19 PROCEDURE — 99213 OFFICE O/P EST LOW 20 MIN: CPT | Performed by: SPECIALIST

## 2018-10-19 NOTE — PROGRESS NOTES
[facility] Progress Note      Patient Name: Junior Mccann   YOB: 1945  Medical Record Number: [id number]  Attending Physician: Artem Courtney. Ángel Rashid M.D.        Date of Visit: 10/19/2018      Chief Complaint  Thrombocytosis, prostate reviewed)  Sister with breast cancer; sister with colon cancer; father with prostate cancer. Social History (historical data, reviewed)  Previous tobacco use but quit 2015; uses alcohol daily.       Current Medications     lisinopril 5 MG Oral Tab Take 1 Normocephalic and atraumatic. Eyes                  Conjunctiva clear; sclera anicteric. ENMT                 External nose normal; external ears normal.  Neck                   Supple; no masses. Hematologic/Lymphatic No petechiae or purpura.  No cerv 14.7 13.0 - 17.0 g/dL    HCT 45.1 37.0 - 53.0 %    .0 150.0 - 450.0 10(3)uL    .3 (H) 80.0 - 99.0 fL    MCH 33.0 27.0 - 33.2 pg    MCHC 32.6 31.0 - 37.0 g/dL    RDW 12.5 11.5 - 16.0 %    RDW-SD 47.4 (H) 35.1 - 46.3 fL    Neutrophil Absolute P

## 2018-11-20 RX ORDER — LEVOTHYROXINE SODIUM 0.12 MG/1
TABLET ORAL
Qty: 30 TABLET | Refills: 0 | Status: SHIPPED | OUTPATIENT
Start: 2018-11-20 | End: 2018-12-27

## 2018-11-20 NOTE — TELEPHONE ENCOUNTER
Name from pharmacy: LEVOTHYROXINE 0.125MG (125MCG) TAB          Will file in chart as: LEVOTHYROXINE SODIUM 125 MCG Oral Tab    Sig: TAKE 1 TABLET BY MOUTH BEFORE BREAKFAST    Disp:  30 tablet    Refills:  0    Start: 11/20/2018    Class: Normal    Request

## 2018-12-28 RX ORDER — LEVOTHYROXINE SODIUM 0.12 MG/1
TABLET ORAL
Qty: 90 TABLET | Refills: 0 | Status: SHIPPED | OUTPATIENT
Start: 2018-12-28 | End: 2019-04-16

## 2018-12-28 NOTE — TELEPHONE ENCOUNTER
Name from pharmacy: LEVOTHYROXINE 0.125MG (125MCG) TAB          Will file in chart as: LEVOTHYROXINE SODIUM 125 MCG Oral Tab    Sig: TAKE 1 TABLET BY MOUTH BEFORE BREAKFAST    Disp:  30 tablet    Refills:  0    Start: 12/27/2018    Class: Normal    Request

## 2019-04-11 ENCOUNTER — TELEPHONE (OUTPATIENT)
Dept: FAMILY MEDICINE CLINIC | Facility: CLINIC | Age: 74
End: 2019-04-11

## 2019-04-11 DIAGNOSIS — I10 ESSENTIAL HYPERTENSION: ICD-10-CM

## 2019-04-11 DIAGNOSIS — E78.5 HYPERLIPIDEMIA, UNSPECIFIED HYPERLIPIDEMIA TYPE: ICD-10-CM

## 2019-04-11 DIAGNOSIS — R73.9 ELEVATED BLOOD SUGAR: ICD-10-CM

## 2019-04-11 DIAGNOSIS — I70.0 ATHEROSCLEROSIS OF AORTA (HCC): ICD-10-CM

## 2019-04-11 DIAGNOSIS — Z51.81 ENCOUNTER FOR MEDICATION MONITORING: ICD-10-CM

## 2019-04-11 DIAGNOSIS — E03.9 ACQUIRED HYPOTHYROIDISM: Primary | ICD-10-CM

## 2019-04-11 NOTE — TELEPHONE ENCOUNTER
Patient is at lab, supposed to have labs ordered for appointment scheduled 4/16/19, was told labs are not ordered yet. Please place orders and call pt to have them done prior to appt.

## 2019-04-11 NOTE — TELEPHONE ENCOUNTER
[facility] to advise patient lab orders have been placed for fasting labs. Advised water only so he is not dehydrated for blood draw.

## 2019-04-12 ENCOUNTER — LAB ENCOUNTER (OUTPATIENT)
Dept: LAB | Age: 74
End: 2019-04-12
Attending: FAMILY MEDICINE
Payer: MEDICARE

## 2019-04-12 DIAGNOSIS — E78.5 HYPERLIPIDEMIA, UNSPECIFIED HYPERLIPIDEMIA TYPE: ICD-10-CM

## 2019-04-12 DIAGNOSIS — I10 ESSENTIAL HYPERTENSION: ICD-10-CM

## 2019-04-12 DIAGNOSIS — Z51.81 ENCOUNTER FOR MEDICATION MONITORING: ICD-10-CM

## 2019-04-12 DIAGNOSIS — I70.0 ATHEROSCLEROSIS OF AORTA (HCC): ICD-10-CM

## 2019-04-12 DIAGNOSIS — R73.9 ELEVATED BLOOD SUGAR: ICD-10-CM

## 2019-04-12 DIAGNOSIS — E03.9 ACQUIRED HYPOTHYROIDISM: ICD-10-CM

## 2019-04-12 PROCEDURE — 84439 ASSAY OF FREE THYROXINE: CPT

## 2019-04-12 PROCEDURE — 36415 COLL VENOUS BLD VENIPUNCTURE: CPT

## 2019-04-12 PROCEDURE — 83036 HEMOGLOBIN GLYCOSYLATED A1C: CPT

## 2019-04-12 PROCEDURE — 80053 COMPREHEN METABOLIC PANEL: CPT

## 2019-04-12 PROCEDURE — 85025 COMPLETE CBC W/AUTO DIFF WBC: CPT

## 2019-04-12 PROCEDURE — 84443 ASSAY THYROID STIM HORMONE: CPT

## 2019-04-12 PROCEDURE — 80061 LIPID PANEL: CPT

## 2019-04-16 ENCOUNTER — OFFICE VISIT (OUTPATIENT)
Dept: FAMILY MEDICINE CLINIC | Facility: CLINIC | Age: 74
End: 2019-04-16
Payer: COMMERCIAL

## 2019-04-16 VITALS
HEIGHT: 65.95 IN | WEIGHT: 157.13 LBS | DIASTOLIC BLOOD PRESSURE: 80 MMHG | RESPIRATION RATE: 16 BRPM | OXYGEN SATURATION: 98 % | BODY MASS INDEX: 25.25 KG/M2 | TEMPERATURE: 98 F | SYSTOLIC BLOOD PRESSURE: 120 MMHG | HEART RATE: 73 BPM

## 2019-04-16 DIAGNOSIS — E55.9 VITAMIN D DEFICIENCY: ICD-10-CM

## 2019-04-16 DIAGNOSIS — Z00.00 ENCOUNTER FOR ANNUAL HEALTH EXAMINATION: Primary | ICD-10-CM

## 2019-04-16 DIAGNOSIS — R73.09 ELEVATED HEMOGLOBIN A1C: ICD-10-CM

## 2019-04-16 DIAGNOSIS — D22.9 CHANGE IN MULTIPLE NEVI: ICD-10-CM

## 2019-04-16 DIAGNOSIS — Z72.89 ALCOHOL USE: ICD-10-CM

## 2019-04-16 DIAGNOSIS — I70.0 ATHEROSCLEROSIS OF AORTA (HCC): ICD-10-CM

## 2019-04-16 DIAGNOSIS — I10 ESSENTIAL HYPERTENSION: ICD-10-CM

## 2019-04-16 DIAGNOSIS — C61 PROSTATE CANCER (HCC): ICD-10-CM

## 2019-04-16 DIAGNOSIS — D75.89 MACROCYTOSIS WITHOUT ANEMIA: ICD-10-CM

## 2019-04-16 DIAGNOSIS — L72.3 SEBACEOUS CYST: ICD-10-CM

## 2019-04-16 DIAGNOSIS — M47.896 OTHER OSTEOARTHRITIS OF SPINE, LUMBAR REGION: ICD-10-CM

## 2019-04-16 DIAGNOSIS — E78.49 OTHER HYPERLIPIDEMIA: ICD-10-CM

## 2019-04-16 DIAGNOSIS — H91.93 BILATERAL HEARING LOSS, UNSPECIFIED HEARING LOSS TYPE: ICD-10-CM

## 2019-04-16 DIAGNOSIS — E03.9 ACQUIRED HYPOTHYROIDISM: ICD-10-CM

## 2019-04-16 PROCEDURE — 99397 PER PM REEVAL EST PAT 65+ YR: CPT | Performed by: FAMILY MEDICINE

## 2019-04-16 PROCEDURE — 96160 PT-FOCUSED HLTH RISK ASSMT: CPT | Performed by: FAMILY MEDICINE

## 2019-04-16 PROCEDURE — G0439 PPPS, SUBSEQ VISIT: HCPCS | Performed by: FAMILY MEDICINE

## 2019-04-16 RX ORDER — LISINOPRIL 5 MG/1
5 TABLET ORAL DAILY
Qty: 90 TABLET | Refills: 2 | Status: SHIPPED | OUTPATIENT
Start: 2019-04-16 | End: 2020-10-01

## 2019-04-16 RX ORDER — LEVOTHYROXINE SODIUM 0.12 MG/1
TABLET ORAL
Qty: 90 TABLET | Refills: 2 | Status: SHIPPED | OUTPATIENT
Start: 2019-04-16 | End: 2020-01-27

## 2019-04-16 NOTE — PATIENT INSTRUCTIONS
Samuel Romero's SCREENING SCHEDULE   Tests on this list are recommended by your physician but may not be covered, or covered at this frequency, by your insurer. Please check with your insurance carrier before scheduling to verify coverage.     Yuko Rodney to Age 78     Colonoscopy Screen   Covered every 10 years- more often if abnormal Colonoscopy due on 06/06/2026 Update Health Maintenance if applicable    Flex Sigmoidoscopy Screen  Covered every 5 years No results found for this or any previous visit.  No found for this or any previous visit. This may be covered with your prescription benefits, but Medicare does not cover unless Medically needed    Zoster (Not covered by Medicare Part B) No orders found for this or any previous visit.  This may be covered wi

## 2019-04-16 NOTE — PROGRESS NOTES
HPI:   Stacy Marie is a 72year old male who presents for a MA (Medicare Advantage) [facility] (Once per calendar year).     Pt is here with his wife  Takes FA, VItamin B12 and Vitamin D supplements  Feeling fine   Denies any abd/pelvic pain  Normal functional status. He has Hearing problems based on screening of functional status.    Hearing Problems?: Yes                  Depression Screening (PHQ-2/PHQ-9): Over the LAST 2 WEEKS   Little interest or pleasure in doing things (ov Component Value Date    CHOLEST 207 (H) 04/12/2019    HDL 90 (H) 04/12/2019     (H) 04/12/2019    TRIG 57 04/12/2019          Last Chemistry Labs:   Lab Results   Component Value Date    AST 15 04/12/2019    ALT 18 04/12/2019    CA 9.1 04/12/2019 use drugs. REVIEW OF SYSTEMS:   GENERAL: feels well otherwise no unusual fatigue  SKIN: Multiple skin lesions on his face. Not regular with sunscreen use. Denies any changes to the lesions.   EYES: denies blurred vision or double vision  HEENT: denies symmetrical, trachea midline, no adenopathy, thyroid: not enlarged, symmetric, no carotid bruit or JVD, R lateral neck with about 4-5mm subcutaneous, mobile mass, NT, no overlying skin changes   Back:   Symmetric, ROM normal, NT   Lungs:   Clear to auscult with colonoscopy    Essential hypertension  Encourage daily compliance with lisinopril 5 mg daily. No medication side effects. Blood pressure controlled with medication. Lytes normal  -     lisinopril 5 MG Oral Tab;  Take 1 tablet (5 mg total) by mouth da assessment: good     PLAN:  The patient indicates understanding of these issues and agrees to the plan. Reinforced healthy diet, lifestyle, and exercise. Return in 6 months (on 10/16/2019).      Luis Felipe Montanez MD, 4/16/2019     General Health     In t per pt Please get every year    Pneumococcal 13 (Prevnar)  Covered Once after 65 05/17/2016 Please get once after your 65th birthday    Pneumococcal 23 (Pneumovax)  Covered Once after 65 05/26/2017 Please get once after your 65th birthday    Hepatitis B fo

## 2019-05-06 ENCOUNTER — TELEPHONE (OUTPATIENT)
Dept: HEMATOLOGY/ONCOLOGY | Facility: HOSPITAL | Age: 74
End: 2019-05-06

## 2019-05-06 ENCOUNTER — SOCIAL WORK SERVICES (OUTPATIENT)
Dept: HEMATOLOGY/ONCOLOGY | Facility: HOSPITAL | Age: 74
End: 2019-05-06

## 2019-05-06 ENCOUNTER — OFFICE VISIT (OUTPATIENT)
Dept: HEMATOLOGY/ONCOLOGY | Facility: HOSPITAL | Age: 74
End: 2019-05-06
Attending: SPECIALIST
Payer: MEDICARE

## 2019-05-06 VITALS
WEIGHT: 151.38 LBS | TEMPERATURE: 99 F | HEIGHT: 65.95 IN | DIASTOLIC BLOOD PRESSURE: 83 MMHG | SYSTOLIC BLOOD PRESSURE: 125 MMHG | HEART RATE: 86 BPM | OXYGEN SATURATION: 92 % | BODY MASS INDEX: 24.33 KG/M2 | RESPIRATION RATE: 16 BRPM

## 2019-05-06 DIAGNOSIS — Z85.46 HISTORY OF PROSTATE CANCER: ICD-10-CM

## 2019-05-06 DIAGNOSIS — D75.89 MACROCYTOSIS: Primary | ICD-10-CM

## 2019-05-06 DIAGNOSIS — Z78.9 DAILY CONSUMPTION OF ALCOHOL: ICD-10-CM

## 2019-05-06 PROCEDURE — 99215 OFFICE O/P EST HI 40 MIN: CPT | Performed by: SPECIALIST

## 2019-05-06 NOTE — PROGRESS NOTES
SW met with patient and his wife regarding their son's alcoholism. Provided general information about Nabeel Clarke v presenting at the ER. Wife states that the son is ready to make a change.   Then she mentioned that we wished her  was ready to make

## 2019-05-06 NOTE — PROGRESS NOTES
[facility] Progress Note      Patient Name: Beth Hensley   YOB: 1945  Medical Record Number: [id number]  Attending Physician: Branda Kanner. Jose G Krueger M.D.        Date of Visit: 5/6/2019      Chief Complaint  Thrombocytosis, prostate ca radiation therapy and short term androgen ablation. Past Surgical History (historical data, reviewed)  Tonsillectomy; vasectomy; robotic assisted laparoscopic prostatectomy and pelvic lymphadenectomy; inguinal hernia repair.      Family History (historic Conjunctiva clear; sclera anicteric. ENMT                 External nose normal; external ears normal.  Neck                   Supple; no masses. Hematologic/Lymphatic No petechiae or purpura. No cervical or supraclavicular adenopathy.   Re Protein 7.1 6.4 - 8.2 g/dL    Albumin 3.9 3.4 - 5.0 g/dL    Globulin  3.2 2.8 - 4.4 g/dL    A/G Ratio 1.2 1.0 - 2.0   CBC W/ DIFFERENTIAL    Collection Time: 04/12/19 11:03 AM   Result Value Ref Range    WBC 4.9 4.0 - 11.0 x10(3) uL    RBC 4.43 3.80 - 5.80 Tissue Sarcoma Program  Section of Hematology/Oncology, JESUS HERNÁNDEZ

## 2019-05-06 NOTE — TELEPHONE ENCOUNTER
MD Tye Almanzar, TARAN             Let patient know that PSA is 0 and that vitamin levels are normal.

## 2019-05-12 PROBLEM — D75.839 THROMBOCYTOSIS: Status: RESOLVED | Noted: 2017-03-05 | Resolved: 2019-05-12

## 2019-05-12 PROBLEM — M47.9 SPINAL OSTEOARTHRITIS: Status: ACTIVE | Noted: 2019-05-12

## 2019-05-12 PROBLEM — R73.09 ELEVATED HEMOGLOBIN A1C: Status: ACTIVE | Noted: 2019-05-12

## 2019-05-15 ENCOUNTER — TELEPHONE (OUTPATIENT)
Dept: HEMATOLOGY/ONCOLOGY | Facility: HOSPITAL | Age: 74
End: 2019-05-15

## 2019-05-15 NOTE — TELEPHONE ENCOUNTER
MD Joyce Buckley, TARAN             Let him know that his monoclonal protein study is normal.      Patient notified. Stated understanding.

## 2019-06-25 ENCOUNTER — OFFICE VISIT (OUTPATIENT)
Dept: FAMILY MEDICINE CLINIC | Facility: CLINIC | Age: 74
End: 2019-06-25
Payer: COMMERCIAL

## 2019-06-25 VITALS
HEART RATE: 90 BPM | WEIGHT: 157.13 LBS | DIASTOLIC BLOOD PRESSURE: 74 MMHG | RESPIRATION RATE: 16 BRPM | BODY MASS INDEX: 25.25 KG/M2 | OXYGEN SATURATION: 98 % | TEMPERATURE: 98 F | SYSTOLIC BLOOD PRESSURE: 124 MMHG | HEIGHT: 65.95 IN

## 2019-06-25 DIAGNOSIS — L03.115 CELLULITIS OF RIGHT LEG: Primary | ICD-10-CM
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
## 2019-06-25 PROCEDURE — 99213 OFFICE O/P EST LOW 20 MIN: CPT | Performed by: FAMILY MEDICINE

## 2019-06-25 RX ORDER — AZITHROMYCIN 250 MG/1
TABLET, FILM COATED ORAL
Qty: 6 TABLET | Refills: 0 | Status: SHIPPED | OUTPATIENT
Start: 2019-06-25 | End: 2019-11-12 | Stop reason: ALTCHOICE

## 2019-06-25 NOTE — PROGRESS NOTES
Noelle Barrientos is a 72year old male. HPI:  Patient states he has noted some bug bites on his lower extremities for about 1 week. Traveled to [address] [place], [place] [address] for a few days, and was working in the yard, noted some bug bites, looked like small red bugs.   Did SURGICAL HISTORY  1/31/2017    cyst excision left jaw   • ROBOT-ASSISTED LAPAROSCOPIC PROSTATECTOMY/ORICAL Bilateral 2/10/2016    Performed by Luis Santiago MD at [facility] MAIN OR   • [address] History    Tobacco Use scratching from bug bite. will treat with Zithromax course as prescribed. Benadryl 25 mg twice daily for 5 days, then twice daily as needed. Warned of possible sedation. Anti-itch lotion to affected areas as needed. Avoid scratching.   Keep areas clean

## 2019-08-28 ENCOUNTER — MED REC SCAN ONLY (OUTPATIENT)
Dept: FAMILY MEDICINE CLINIC | Facility: CLINIC | Age: 74
End: 2019-08-28

## 2019-11-07 ENCOUNTER — TELEPHONE (OUTPATIENT)
Dept: FAMILY MEDICINE CLINIC | Facility: CLINIC | Age: 74
End: 2019-11-07

## 2019-11-07 NOTE — TELEPHONE ENCOUNTER
Spoke to patient/spouse and gave information.      Future Appointments   Date Time Provider Geoff Zamora   11/12/2019 11:40 AM Evelia Duran MD [facility]

## 2019-11-07 NOTE — TELEPHONE ENCOUNTER
Patient's wife called, states patient is having pain in both hips and wife is concerned due to him having had prostate cancer 2 years ago, nurse advised me to schedule patient however no openings until 11/22, please advise. Can patient come in next week?

## 2019-11-12 ENCOUNTER — OFFICE VISIT (OUTPATIENT)
Dept: FAMILY MEDICINE CLINIC | Facility: CLINIC | Age: 74
End: 2019-11-12
Payer: COMMERCIAL

## 2019-11-12 VITALS
RESPIRATION RATE: 16 BRPM | BODY MASS INDEX: 26.03 KG/M2 | OXYGEN SATURATION: 91 % | SYSTOLIC BLOOD PRESSURE: 120 MMHG | TEMPERATURE: 98 F | WEIGHT: 162 LBS | HEART RATE: 91 BPM | HEIGHT: 66 IN | DIASTOLIC BLOOD PRESSURE: 78 MMHG

## 2019-11-12 DIAGNOSIS — Z51.81 ENCOUNTER FOR MEDICATION MONITORING: ICD-10-CM

## 2019-11-12 DIAGNOSIS — R06.00 DOE (DYSPNEA ON EXERTION): ICD-10-CM

## 2019-11-12 DIAGNOSIS — M25.552 BILATERAL HIP PAIN: Primary | ICD-10-CM

## 2019-11-12 DIAGNOSIS — H40.033 NARROW ANGLE OF ANTERIOR CHAMBER OF BOTH EYES: ICD-10-CM

## 2019-11-12 DIAGNOSIS — H26.8 OTHER CATARACT OF BOTH EYES: ICD-10-CM

## 2019-11-12 DIAGNOSIS — R73.09 ELEVATED HEMOGLOBIN A1C: ICD-10-CM

## 2019-11-12 DIAGNOSIS — M25.551 BILATERAL HIP PAIN: Primary | ICD-10-CM

## 2019-11-12 DIAGNOSIS — E03.9 ACQUIRED HYPOTHYROIDISM: ICD-10-CM

## 2019-11-12 DIAGNOSIS — I10 ESSENTIAL HYPERTENSION: ICD-10-CM

## 2019-11-12 DIAGNOSIS — R05.9 COUGH: ICD-10-CM

## 2019-11-12 PROCEDURE — 99214 OFFICE O/P EST MOD 30 MIN: CPT | Performed by: FAMILY MEDICINE

## 2019-11-12 NOTE — PROGRESS NOTES
Tiffany Cohen is a 72year old male. HPI:  Pt is here with his wife  Pt c/o hip pain bilat. Mostly with walking  sxs on/off for a few years  Last flare was last week  No meds taken for pain, states is not severe and when sits down, pain is resolved. 2/10/2016    Performed by Gayla Vidal MD at [address] [place] [address]   • VASECTOMY     • YAG IRIDOTOMY - OU - BOTH EYES  07/2019         Social History    Tobacco Use      Smoking status: Former Smoker        Packs/day: 0.50        Years: 50.00 times  Likely DJD  May benefit from some stretching exercises, reviewed. Local ice/heat, alternate  Ibuprofen or Aleve bid/prn, take with food and d/c if notes any GI SEs  Monitor and if notes increased freq or intensity of flare-ups will rec imaging.

## 2019-11-13 ENCOUNTER — PATIENT OUTREACH (OUTPATIENT)
Dept: CASE MANAGEMENT | Age: 74
End: 2019-11-13

## 2019-11-13 ENCOUNTER — HOSPITAL ENCOUNTER (OUTPATIENT)
Dept: GENERAL RADIOLOGY | Age: 74
Discharge: HOME | End: 2019-11-13
Attending: FAMILY MEDICINE
Payer: MEDICARE

## 2019-11-13 DIAGNOSIS — R05.9 COUGH: ICD-10-CM

## 2019-11-13 DIAGNOSIS — R06.00 DOE (DYSPNEA ON EXERTION): ICD-10-CM

## 2019-11-13 PROBLEM — H40.033 NARROW ANGLE OF ANTERIOR CHAMBER OF BOTH EYES: Status: ACTIVE | Noted: 2019-06-26

## 2019-11-13 PROCEDURE — 71046 X-RAY EXAM CHEST 2 VIEWS: CPT | Performed by: FAMILY MEDICINE

## 2019-12-01 PROBLEM — H26.9 CATARACTA: Status: ACTIVE | Noted: 2019-12-01

## 2019-12-18 DIAGNOSIS — E03.9 ACQUIRED HYPOTHYROIDISM: Primary | ICD-10-CM

## 2019-12-18 DIAGNOSIS — Z51.81 ENCOUNTER FOR MEDICATION MONITORING: ICD-10-CM

## 2020-01-21 LAB
T4, FREE: 1.2 NG/DL (ref 0.8–1.8)
TSH: 3.34 MIU/L (ref 0.4–4.5)

## 2020-01-27 RX ORDER — LEVOTHYROXINE SODIUM 0.12 MG/1
TABLET ORAL
Qty: 90 TABLET | Refills: 2 | Status: SHIPPED | OUTPATIENT
Start: 2020-01-27 | End: 2021-01-11

## 2020-01-27 NOTE — TELEPHONE ENCOUNTER
LOV 11/19    LAST LAB 1/20    LAST RX   Levothyroxine Sodium 125 MCG Oral Tab 90 tablet 2 4/16/2019          Next OV 2/7/2020      PROTOCOL  Thyroid Supplements Protocol Passed    Refilled x 9 months.

## 2020-02-07 ENCOUNTER — OFFICE VISIT (OUTPATIENT)
Dept: FAMILY MEDICINE CLINIC | Facility: CLINIC | Age: 75
End: 2020-02-07
Payer: COMMERCIAL

## 2020-02-07 VITALS
RESPIRATION RATE: 16 BRPM | WEIGHT: 159 LBS | OXYGEN SATURATION: 92 % | TEMPERATURE: 97 F | DIASTOLIC BLOOD PRESSURE: 68 MMHG | BODY MASS INDEX: 25.55 KG/M2 | SYSTOLIC BLOOD PRESSURE: 112 MMHG | HEART RATE: 82 BPM | HEIGHT: 66 IN

## 2020-02-07 DIAGNOSIS — I10 ESSENTIAL HYPERTENSION: ICD-10-CM

## 2020-02-07 DIAGNOSIS — R73.09 ELEVATED HEMOGLOBIN A1C: ICD-10-CM

## 2020-02-07 DIAGNOSIS — I70.0 ATHEROSCLEROSIS OF AORTA (HCC): ICD-10-CM

## 2020-02-07 DIAGNOSIS — E03.9 ACQUIRED HYPOTHYROIDISM: Primary | ICD-10-CM

## 2020-02-07 DIAGNOSIS — H91.8X3 OTHER SPECIFIED HEARING LOSS OF BOTH EARS: ICD-10-CM

## 2020-02-07 DIAGNOSIS — E78.5 DYSLIPIDEMIA: ICD-10-CM

## 2020-02-07 DIAGNOSIS — Z51.81 ENCOUNTER FOR MEDICATION MONITORING: ICD-10-CM

## 2020-02-07 DIAGNOSIS — C61 PROSTATE CANCER (HCC): ICD-10-CM

## 2020-02-07 PROCEDURE — 99213 OFFICE O/P EST LOW 20 MIN: CPT | Performed by: FAMILY MEDICINE

## 2020-02-07 NOTE — PROGRESS NOTES
Theresa Beltran is a 69year old male. HPI:  Here with wife  Pt is here for f/u on meds  Overall doing well  No c/o  Taking Levothyroxine daily except Sundays as per instructions after labs in   November 2019, with low TSH. Denies any unusual fatigue. TONSILLECTOMY  1953   • VASECTOMY     • YAG IRIDOTOMY - OU - BOTH EYES  07/2019         Social History    Tobacco Use      Smoking status: Former Smoker        Packs/day: 0.50        Years: 50.00        Pack years: 25        Quit date: 5/15/2015        Jenifer Vann INTERNAL    4. Atherosclerosis of aorta (HCC)  On aspirin daily. Blood pressure controlled. Monitor lipids. - CBC WITH DIFFERENTIAL WITH PLATELET    5.  Dyslipidemia  Reviewed diet and exercise.  - LIPID PANEL    6. Prostate cancer ([facility] [address]  Due for follow-

## 2020-06-28 ENCOUNTER — MA CHART PREP (OUTPATIENT)
Dept: FAMILY MEDICINE CLINIC | Facility: CLINIC | Age: 75
End: 2020-06-28

## 2020-06-30 ENCOUNTER — OFFICE VISIT (OUTPATIENT)
Dept: FAMILY MEDICINE CLINIC | Facility: CLINIC | Age: 75
End: 2020-06-30
Payer: COMMERCIAL

## 2020-06-30 VITALS
OXYGEN SATURATION: 95 % | RESPIRATION RATE: 16 BRPM | HEART RATE: 76 BPM | SYSTOLIC BLOOD PRESSURE: 98 MMHG | HEIGHT: 66 IN | BODY MASS INDEX: 24.27 KG/M2 | DIASTOLIC BLOOD PRESSURE: 62 MMHG | TEMPERATURE: 97 F | WEIGHT: 151 LBS

## 2020-06-30 DIAGNOSIS — M47.896 OTHER OSTEOARTHRITIS OF SPINE, LUMBAR REGION: ICD-10-CM

## 2020-06-30 DIAGNOSIS — Z51.81 ENCOUNTER FOR MEDICATION MONITORING: ICD-10-CM

## 2020-06-30 DIAGNOSIS — H91.8X3 OTHER SPECIFIED HEARING LOSS OF BOTH EARS: ICD-10-CM

## 2020-06-30 DIAGNOSIS — H26.8 OTHER CATARACT OF BOTH EYES: ICD-10-CM

## 2020-06-30 DIAGNOSIS — Z00.00 ENCOUNTER FOR ANNUAL HEALTH EXAMINATION: Primary | ICD-10-CM

## 2020-06-30 DIAGNOSIS — H40.033 NARROW ANGLE OF ANTERIOR CHAMBER OF BOTH EYES: ICD-10-CM

## 2020-06-30 DIAGNOSIS — E03.9 ACQUIRED HYPOTHYROIDISM: ICD-10-CM

## 2020-06-30 DIAGNOSIS — I10 ESSENTIAL HYPERTENSION: ICD-10-CM

## 2020-06-30 DIAGNOSIS — E78.49 OTHER HYPERLIPIDEMIA: ICD-10-CM

## 2020-06-30 DIAGNOSIS — R73.01 IFG (IMPAIRED FASTING GLUCOSE): ICD-10-CM

## 2020-06-30 DIAGNOSIS — I70.0 ATHEROSCLEROSIS OF AORTA (HCC): ICD-10-CM

## 2020-06-30 DIAGNOSIS — C61 PROSTATE CANCER (HCC): ICD-10-CM

## 2020-06-30 PROCEDURE — G0439 PPPS, SUBSEQ VISIT: HCPCS | Performed by: FAMILY MEDICINE

## 2020-06-30 PROCEDURE — 99397 PER PM REEVAL EST PAT 65+ YR: CPT | Performed by: FAMILY MEDICINE

## 2020-06-30 PROCEDURE — 96160 PT-FOCUSED HLTH RISK ASSMT: CPT | Performed by: FAMILY MEDICINE

## 2020-06-30 NOTE — PATIENT INSTRUCTIONS
STOP LISINOPRIL    MONITOR HOME READINGS ABOUT 3 DAYS/WEEK, CALL IF NOTES ELEVATED ABOVE 140/90      Samuel Romero's SCREENING SCHEDULE   Tests on this list are recommended by your physician but may not be covered, or covered at this frequency, by your Anyone with a family history    Colorectal Cancer Screening Covered up to Age 76     Colonoscopy Screen   Covered every 10 years- more often if abnormal Colonoscopy due on 06/06/2026 Update [facility] if applicable    Flex Sigmoidoscopy Screen  Cov a cut with metal- TD and TDaP Not covered by Medicare Part B) No orders found for this or any previous visit.  This may be covered with your prescription benefits, but Medicare does not cover unless Medically needed    Zoster (Not covered by Medicare Part B

## 2020-06-30 NOTE — PROGRESS NOTES
HPI:   Marshal Duke is a 69year old male who presents for a MA (Medicare Advantage) [facility] (Once per calendar year). Pt states he is doing well overall  Decreased alcohol intake significantly for last few months on encouragement of his wife.  No and patient is instructed to get our office a copy of it for scanning into Epic. He smoked tobacco in the past but quit greater than 12 months ago.   Social History    Tobacco Use      Smoking status: Former Smoker        Packs/day: 0.50        Romayne Calkin DIRECTED (Patient taking differently: TAKE 1 TABLET BY MOUTH DAILY IN THE MORNING AS DIRECTED, except Sunday )  lisinopril 5 MG Oral Tab, Take 1 tablet (5 mg total) by mouth daily. aspirin 81 MG Oral Tab, Take 81 mg by mouth every other day.     BIOTIN OR, anemia  ENDOCRINE: on levothyroxine, feeling fine on current dose  ALL/ASTHMA: denies hx of allergy or asthma    EXAM:   BP 98/62   Pulse 76   Temp 97.2 °F (36.2 °C) (Temporal)   Resp 16   Ht 66\"   Wt 151 lb (68.5 kg)   SpO2 95%   BMI 24.37 kg/m²   Estima normal, scattered lentinginous lesions on face and extremities, multiple moles, SKs   Lymph nodes: Cervical, supraclavicular, and axillary nodes normal   Neurologic:  Alert and oriented ×3, no focal deficits.  Normal gait.           Vaccination History THYROXINE)    Narrow angle of anterior chamber of both eyes  Schedule appointment with ophthalmology. Other specified hearing loss of both ears  Patient defers hearing aids. Has had MRI in the past, benign. Defers audiology evaluation.     Prostate can Blood Sugar (FSB)Annually GLUCOSE (mg/dL)   Date Value   06/10/2020 102 (H)       Cardiovascular Disease Screening     LDL Annually LDL-CHOLESTEROL (mg/dL (calc))   Date Value   06/10/2020 108 (H)        EKG - w/ Initial Preventative Physical Exam only, or series at local pharmacy.  This may be covered with your pharmacy  prescription benefits      SPECIFIC DISEASE MONITORING Internal Lab or Procedure External Lab or Procedure      Annual Monitoring of Persistent     Medications (ACE/ARB, digoxin diuretics, a

## 2020-09-30 NOTE — PROGRESS NOTES
[facility] Progress Note      Patient Name: José Luis Orozco   YOB: 1945  Medical Record Number: [id number]  Attending Physician: Juana Segovia M.D.        Date of Visit: 10/1/2020      Chief Complaint  Thrombocytosis, prostate c lymphadenectomy; inguinal hernia repair. Family History (historical data, reviewed)  Sister with breast cancer; sister with colon cancer; father with prostate cancer.     Social History (historical data, reviewed)  Previous tobacco use but quit 2015; us nontender; no masses; no hepatosplenomegaly. Extremities  No lower extremity edema. Neurologic           Alert and oriented x 3; motor and sensory grossly intact. Psychiatric          Mood and affect appropriate.     Laboratory   Recent Results (from th Range    CHOLESTEROL, TOTAL 198 <200 mg/dL    HDL CHOLESTEROL 73 > OR = 40 mg/dL    TRIGLYCERIDES 77 <150 mg/dL    LDL-CHOLESTEROL 108 (H) mg/dL (calc)    CHOL/HDLC RATIO 2.7 <5.0 (calc)    NON-HDL CHOLESTEROL 125 <130 mg/dL (calc)   HEMOGLOBIN A1C    Shannan

## 2020-10-01 ENCOUNTER — OFFICE VISIT (OUTPATIENT)
Dept: HEMATOLOGY/ONCOLOGY | Facility: HOSPITAL | Age: 75
End: 2020-10-01
Attending: SPECIALIST
Payer: MEDICARE

## 2020-10-01 VITALS
HEIGHT: 65.98 IN | SYSTOLIC BLOOD PRESSURE: 145 MMHG | WEIGHT: 152.81 LBS | RESPIRATION RATE: 16 BRPM | HEART RATE: 73 BPM | TEMPERATURE: 98 F | DIASTOLIC BLOOD PRESSURE: 91 MMHG | BODY MASS INDEX: 24.56 KG/M2 | OXYGEN SATURATION: 16 %

## 2020-10-01 DIAGNOSIS — C61 PROSTATE CANCER (HCC): Primary | ICD-10-CM

## 2020-10-01 DIAGNOSIS — Z85.46 HISTORY OF PROSTATE CANCER: ICD-10-CM

## 2020-10-01 DIAGNOSIS — D75.89 MACROCYTOSIS: ICD-10-CM

## 2020-10-01 PROCEDURE — 99214 OFFICE O/P EST MOD 30 MIN: CPT | Performed by: SPECIALIST

## 2020-10-01 NOTE — PROGRESS NOTES
Patient is here today for follow up with Dr. Wilma Galicia for Macrocytosis. . Patient denies pain. Stated he is feeling good. Medication list and medical history were reviewed and updated.      Education Record    Learner:  Patient    Disease / Diagnosis: Macro

## 2020-10-05 ENCOUNTER — TELEPHONE (OUTPATIENT)
Dept: HEMATOLOGY/ONCOLOGY | Facility: HOSPITAL | Age: 75
End: 2020-10-05

## 2020-10-05 NOTE — TELEPHONE ENCOUNTER
Patient received a call from Dr. Marek Jesus on 10/2/20 and he is returning Dr. Rolando Shaw call regarding his PSA

## 2020-10-07 ENCOUNTER — TELEPHONE (OUTPATIENT)
Dept: FAMILY MEDICINE CLINIC | Facility: CLINIC | Age: 75
End: 2020-10-07

## 2020-10-07 NOTE — TELEPHONE ENCOUNTER
Spouse called and stated the patients PSA levels (along with BP) are elevating. Spouse stated it seems the cancer is back. Claritza Roman just wanted to inform Ghouse of this and stated she has just a couple questions.      Claritza Roman is requesting Dr. Nirmal Guzman call

## 2020-10-07 NOTE — TELEPHONE ENCOUNTER
Noted   Written by Urban Mixon MD on 10/2/2020 12:50 PM  Kaleb Chiu, I left you a voicemail to give me a call so we can discuss this result.      Called and spoke with pt's wife, Alexander Ruiz ([id number] per HIPAA), to inform will need to f/u with Dr. Denzel Peace to fur

## 2020-10-08 ENCOUNTER — APPOINTMENT (OUTPATIENT)
Dept: HEMATOLOGY/ONCOLOGY | Facility: HOSPITAL | Age: 75
End: 2020-10-08
Attending: SPECIALIST
Payer: MEDICARE

## 2020-10-08 NOTE — TELEPHONE ENCOUNTER
Lm for pt's wife, German Cruz ([address] Thompson per HIPAA), to inform per PCP agrees to make sure follow-up with oncologist regarding PSA testing.   Regarding pt's blood pressures, pt should continue to monitor and would recommend office visit sometime this month for reevaluati

## 2020-10-30 ENCOUNTER — WALK IN (OUTPATIENT)
Dept: URGENT CARE | Age: 75
End: 2020-10-30

## 2020-10-30 ENCOUNTER — TELEPHONE (OUTPATIENT)
Dept: FAMILY MEDICINE CLINIC | Facility: CLINIC | Age: 75
End: 2020-10-30

## 2020-10-30 DIAGNOSIS — J06.9 VIRAL URI WITH COUGH: Primary | ICD-10-CM

## 2020-10-30 DIAGNOSIS — Z20.822 SUSPECTED COVID-19 VIRUS INFECTION: ICD-10-CM

## 2020-10-30 LAB — SARS-COV+SARS-COV-2 AG RESP QL IA.RAPID: NOT DETECTED

## 2020-10-30 PROCEDURE — 99203 OFFICE O/P NEW LOW 30 MIN: CPT | Performed by: GENERAL PRACTICE

## 2020-10-30 PROCEDURE — 87426 SARSCOV CORONAVIRUS AG IA: CPT | Performed by: INTERNAL MEDICINE

## 2020-10-30 RX ORDER — ASPIRIN 81 MG/1
81 TABLET, CHEWABLE ORAL DAILY
COMMUNITY

## 2020-10-30 ASSESSMENT — PAIN SCALES - GENERAL: PAINLEVEL: 0

## 2020-10-30 NOTE — TELEPHONE ENCOUNTER
Pt's wife called stating they are currently in [place]. Pt developed a cough, sore throat, cold last night. Fever f 100.4.     He is currently looking up where to go up in [place] to get a Covid test.

## 2020-11-02 NOTE — TELEPHONE ENCOUNTER
Called patient's wife for condition update. States they are still in [place] visiting relatives. Patient's Covid test was negative. Started taking warm TheraFlu and symptoms are improving. Only has a cough.   Instructed to push fluids, warm tea w skylar

## 2020-11-07 ENCOUNTER — HOSPITAL ENCOUNTER (INPATIENT)
Facility: HOSPITAL | Age: 75
LOS: 4 days | Discharge: HOME | DRG: 177 | End: 2020-11-11
Attending: EMERGENCY MEDICINE | Admitting: HOSPITALIST
Payer: MEDICARE

## 2020-11-07 ENCOUNTER — APPOINTMENT (OUTPATIENT)
Dept: GENERAL RADIOLOGY | Age: 75
DRG: 177 | End: 2020-11-07
Attending: NURSE PRACTITIONER
Payer: MEDICARE

## 2020-11-07 DIAGNOSIS — U07.1 COVID-19 VIRUS INFECTION: Primary | ICD-10-CM

## 2020-11-07 DIAGNOSIS — R09.02 HYPOXIA: ICD-10-CM

## 2020-11-07 DIAGNOSIS — D72.819 LEUKOPENIA, UNSPECIFIED TYPE: ICD-10-CM

## 2020-11-07 PROCEDURE — 71045 X-RAY EXAM CHEST 1 VIEW: CPT | Performed by: NURSE PRACTITIONER

## 2020-11-07 PROCEDURE — 99223 1ST HOSP IP/OBS HIGH 75: CPT | Performed by: HOSPITALIST

## 2020-11-07 RX ORDER — LEVOTHYROXINE SODIUM 0.12 MG/1
125 TABLET ORAL
Status: DISCONTINUED | OUTPATIENT
Start: 2020-11-08 | End: 2020-11-11

## 2020-11-07 RX ORDER — METOCLOPRAMIDE HYDROCHLORIDE 5 MG/ML
10 INJECTION INTRAMUSCULAR; INTRAVENOUS EVERY 8 HOURS PRN
Status: DISCONTINUED | OUTPATIENT
Start: 2020-11-07 | End: 2020-11-11

## 2020-11-07 RX ORDER — ACETAMINOPHEN 325 MG/1
650 TABLET ORAL EVERY 6 HOURS PRN
Status: DISCONTINUED | OUTPATIENT
Start: 2020-11-07 | End: 2020-11-11

## 2020-11-07 RX ORDER — ONDANSETRON 2 MG/ML
4 INJECTION INTRAMUSCULAR; INTRAVENOUS EVERY 6 HOURS PRN
Status: DISCONTINUED | OUTPATIENT
Start: 2020-11-07 | End: 2020-11-11

## 2020-11-07 RX ORDER — ASPIRIN 81 MG/1
81 TABLET, CHEWABLE ORAL EVERY OTHER DAY
Status: DISCONTINUED | OUTPATIENT
Start: 2020-11-07 | End: 2020-11-11

## 2020-11-07 RX ORDER — ACETAMINOPHEN 500 MG
1000 TABLET ORAL ONCE
Status: COMPLETED | OUTPATIENT
Start: 2020-11-07 | End: 2020-11-07

## 2020-11-07 RX ORDER — ENOXAPARIN SODIUM 100 MG/ML
40 INJECTION SUBCUTANEOUS NIGHTLY
Status: DISCONTINUED | OUTPATIENT
Start: 2020-11-07 | End: 2020-11-11

## 2020-11-07 NOTE — ED PROVIDER NOTES
Patient Seen in: THE [facility] In [place]      History   Patient presents with:   Body ache and/or chills  Cough/URI    Stated Complaint: fever, cough, chills, fatigue for one week    HPI    71-year-old male presents with cough, body ache Smokeless tobacco: Former User        Quit date: 7/1/2015    Alcohol use:  Yes      Alcohol/week: 0.0 standard drinks      Comment: 2-3  beers per night    Drug use: Yes      Types: Cannabis      Comment: 1 x per week             Review of Systems    Posi the following components:    C-Reactive Protein 1.37 (*)     All other components within normal limits   RAPID SARS-COV-2 BY PCR - Abnormal; Notable for the following components:    Rapid SARS-CoV-2 by PCR Detected (*)     All other components within william PATIENT STATED HISTORY: (As transcribed by Technologist) Alisha Duke has a low grade fever, chills, and cough for 1 week.  Patient was tested for covid 1 week ago- negative.            FINDINGS:  Subsegmental atelectasis or scarring noted at the right lung b

## 2020-11-07 NOTE — ED PROVIDER NOTES
Patient Seen in: THE [facility] In [place]      History   Patient presents with:   Body ache and/or chills  Cough/URI    Stated Complaint: fever, cough, chills, fatigue for one week    HPI    This is a 79-year-old male past medical history quittin.4      Smokeless tobacco: Former User        Quit date: 2015    Alcohol use:  Yes      Alcohol/week: 0.0 standard drinks      Comment: 2-3  beers per night    Drug use: Yes      Types: Cannabis      Comment: 1 x per week             Review o AP chest radiograph was obtained.   COMPARISON:  [facility], XR, XR CHEST PA + LAT CHEST (CPT=71046), 11/13/2019, 1:16 PM.  INDICATIONS:  fever, cough, chills, fatigue for one week  PATIENT STATED HISTORY: (As transcribed by Technologist)  Patient has a low this visit. There is no disposition time on file for this visit. Follow-up:  No follow-up provider specified.         Medications Prescribed:  Current Discharge Medication List

## 2020-11-08 PROCEDURE — XW033E5 INTRODUCTION OF REMDESIVIR ANTI-INFECTIVE INTO PERIPHERAL VEIN, PERCUTANEOUS APPROACH, NEW TECHNOLOGY GROUP 5: ICD-10-PCS | Performed by: INTERNAL MEDICINE

## 2020-11-08 PROCEDURE — 3E0333Z INTRODUCTION OF ANTI-INFLAMMATORY INTO PERIPHERAL VEIN, PERCUTANEOUS APPROACH: ICD-10-PCS | Performed by: INTERNAL MEDICINE

## 2020-11-08 PROCEDURE — 99232 SBSQ HOSP IP/OBS MODERATE 35: CPT | Performed by: INTERNAL MEDICINE

## 2020-11-08 RX ORDER — BENZONATATE 100 MG/1
100 CAPSULE ORAL 3 TIMES DAILY PRN
Status: DISCONTINUED | OUTPATIENT
Start: 2020-11-08 | End: 2020-11-11

## 2020-11-08 RX ORDER — CODEINE PHOSPHATE AND GUAIFENESIN 10; 100 MG/5ML; MG/5ML
5 SOLUTION ORAL EVERY 4 HOURS PRN
Status: DISCONTINUED | OUTPATIENT
Start: 2020-11-08 | End: 2020-11-11

## 2020-11-08 RX ORDER — SODIUM CHLORIDE 9 MG/ML
INJECTION, SOLUTION INTRAVENOUS ONCE
Status: DISCONTINUED | OUTPATIENT
Start: 2020-11-08 | End: 2020-11-11

## 2020-11-08 NOTE — CONSULTS
INFECTIOUS DISEASE CONSULT NOTE    Jeanna Romero Patient Status:  Inpatient    1945 MRN [id number]   [facility] 5NW-A Attending Hero Ornelas MD   Hosp Day # 1 PCP Cristian Easley (rheumatic heart disease) Mother    • High Blood Pressure Sister       reports that he quit smoking about 5 years ago. He has a 25.00 pack-year smoking history. He quit smokeless tobacco use about 5 years ago. He reports current alcohol use.  He reports cur oriented x 3 on the phone  Vital signs: Blood pressure 115/79, pulse 78, temperature 100.2 °F (37.9 °C), temperature source Oral, resp. rate 18, height 5' 8\" (1.727 m), weight 155 lb (70.3 kg), SpO2 94 %.      Laboratory Data:    Recent Labs   Lab 11/07/20 CONCLUSION:  Right basilar subsegmental atelectasis is stable since previous study. Dictated by (CST): Lizeth Grimes MD on 11/07/2020 at 3:31 PM     Finalized by (CST): Lizeth Grimes MD on 11/07/2020 at 3:32 PM        ASSESSMENT/ PLAN:    1.  Covid

## 2020-11-08 NOTE — PROGRESS NOTES
NURSING ADMISSION NOTE      Patient admitted via Wheelchair  Oriented to room. Safety precautions initiated. Bed in low position. Call light in reach. Admission navigator and med req complete.  Pt A&Ox4 upon arrival, O2 sats >95% on 2L NC resting

## 2020-11-08 NOTE — PLAN OF CARE
Pt is A&Ox4, [place]. VSS, elevation in temp to 100- tylenol given per MAR. Maintaining O2 sats >90% on 1-2L NC overnight, RA at baseline. Dry cough. Tele, NSR. Lovenox. Reports last BM was on 11/7. Tolerating regular diet. Denies n/v/d. Voids per toilet.  Up i and evaluate response  - Implement non-pharmacological measures as appropriate and evaluate response  - Consider cultural and social influences on pain and pain management  - Manage/alleviate anxiety  - Utilize distraction and/or relaxation techniques  - M support system  Outcome: Progressing     Problem: Patient/Family Goals  Goal: Patient/Family Long Term Goal  Description: Patient's Long Term Goal: discharge back home with wife    Interventions:  - follow POC  - update pt with POC  - medication per [facility]  -

## 2020-11-08 NOTE — PROGRESS NOTES
[facility]  Progress Note     Marty Lucas Patient Status:  Inpatient    1945 MRN [id number]   [facility] 5NW-A Attending Lalit Espinoza MD   Hosp Day # 1 PCP Karyle Favorite, MD     Chief Complaint: fatigue    S: Patient Creatinine Kinase  Recent Labs   Lab 11/07/20  1540   CK 55       Inflammatory Markers  Recent Labs   Lab 11/07/20  1540 11/08/20  0547   CRP 1.37* 1.76*   .8 172.0    150   DDIMER 0.96* 0.64         Imaging: Imaging data reviewed in Ep

## 2020-11-08 NOTE — PLAN OF CARE
Assumed  Care of pt who is A&o x4. Vital signs stable. Episodes of desaturation on 2L into the upper 80's, 02 increased to 3L. Pt has a dry cough & headache. Tylenol, tessalon & ice packs given. Up independently. Remains on tele.  Will monitor

## 2020-11-08 NOTE — H&P
[facility]  History and Physical     Skylar Corado Patient Status:  Inpatient    1945 MRN [id number]   [facility] 5NW-A Attending Preet Arias, DO   Hosp Day # 0 PCP Rose Marie Richter MD     Chief Complaint: fever, cough, 25.00 pack-year smoking history. He quit smokeless tobacco use about 5 years ago. He reports current alcohol use. He reports current drug use. Drug: Cannabis.     Family History:   Family History   Problem Relation Age of Onset   • Other (allergic rhinitis) cyanosis. Integument: No rashes or lesions. Psychiatric: Appropriate mood and affect.       Diagnostic Data:      Labs:  Recent Labs   Lab 11/07/20  1540   WBC 3.1*   HGB 15.9   MCV 97.9   .0       Recent Labs   Lab 11/07/20  1540   GLU 99   BUN 1

## 2020-11-09 ENCOUNTER — TELEPHONE (OUTPATIENT)
Dept: FAMILY MEDICINE CLINIC | Facility: CLINIC | Age: 75
End: 2020-11-09

## 2020-11-09 PROCEDURE — 99232 SBSQ HOSP IP/OBS MODERATE 35: CPT | Performed by: INTERNAL MEDICINE

## 2020-11-09 NOTE — PROGRESS NOTES
INFECTIOUS DISEASE PROGRESS NOTE    Meg Last Romero Patient Status:  Inpatient    1945 MRN [id number]   [facility] 5NW-A Attending Lianne Marinelli MD   Hosp Day # 2 PCP Seth Dyer GFRNAA [phone]   CA 9.1 9.3   ALB 3.7 3.5   * 136   K 4.2 4.0    104   CO2 26.0 25.0   ALKPHO 81 73   AST 15 15   ALT 20 20   BILT 0.7 0.4   TP 7.8 7.5     No results found for: ESRML   C-Reactive Protein (mg/dL)   Date Value   11/09/2020 2.64 would consider actemra if develops hypoxia with O2 requirements above 6L, but only on 2L now and inflammatory markers not very elevated  - prone as able, otherwise encourage ambulation and IS  - trend inflammatory markers    2. acute hypoxic resp failure d

## 2020-11-09 NOTE — PLAN OF CARE
Problem: RESPIRATORY - ADULT  Goal: Achieves optimal ventilation and oxygenation  Description: INTERVENTIONS:  - Assess for changes in respiratory status  - Assess for changes in mentation and behavior  - Position to facilitate oxygenation and minimize r pain with activity and pre-medicate as appropriate  Outcome: Progressing     Problem: RISK FOR INFECTION - ADULT  Goal: Absence of fever/infection during anticipated neutropenic period  Description: INTERVENTIONS  - Monitor WBC  - Administer growth factors

## 2020-11-09 NOTE — PLAN OF CARE
Problem: RESPIRATORY - ADULT  Goal: Achieves optimal ventilation and oxygenation  Description: INTERVENTIONS:  - Assess for changes in respiratory status  - Assess for changes in mentation and behavior  - Position to facilitate oxygenation and minimize r Problem: RISK FOR INFECTION - ADULT  Goal: Absence of fever/infection during anticipated neutropenic period  Description: INTERVENTIONS  - Monitor WBC  - Administer growth factors as ordered  - Implement neutropenic guidelines  Outcome: Progressing     P POC  - update pt with POC  - medication per [facility]  - See additional Care Plan goals for specific interventions  Outcome: Progressing  Goal: Patient/Family Short Term Goal  Description: Patient's Short Term Goal:   11/7 NOC: remain afebrile, wean o2  11/08/20

## 2020-11-09 NOTE — PROGRESS NOTES
ROBBIE HOSPITALIST  Progress Note     Marty Lucas Patient Status:  Inpatient    1945 MRN [id number]   [facility] 5NW-A Attending Lalit Espinoza MD   Hosp Day # 2 PCP Karyle Favorite, MD     Chief Complaint: hypoxia    S: Patient 11/07/2020       Pro-Calcitonin  Recent Labs   Lab 11/07/20  1540   PCT <0.05       Cardiac  Recent Labs   Lab 11/07/20  1540   TROP <0.045   PBNP 36       Creatinine Kinase  Recent Labs   Lab 11/07/20  1540   CK 55       Inflammatory Markers  Recent Labs

## 2020-11-09 NOTE — TELEPHONE ENCOUNTER
Pt's wife called to inform Dr Fatoumata Lauren that Pt was taken to THE [facility] ED Saturday and was admitted. As per wife Pt was doing better today.

## 2020-11-10 PROCEDURE — 99232 SBSQ HOSP IP/OBS MODERATE 35: CPT | Performed by: INTERNAL MEDICINE

## 2020-11-10 NOTE — PLAN OF CARE
A/O x4. Vital signs stable. Tolerating 1L with saturations of >92%, will wean as able. Tele-NSR. Voiding freely. No complaints of pain. Up self. IV remdesivir. Safety precautions in place. Pt updated on plan of care. WCTM.     Problem: RESPIRATORY - ADULT

## 2020-11-10 NOTE — PLAN OF CARE
Pt is A/O x4, VSS. [place]. Maintaining O2 sats >90% on 1L, weaning as tolerated. Denies SOB with exertion. Pt was able to prone for an hour. IS-2000. Po decadron. Tele- NSR. Lovenox. Voids freely. No c/o pain or n/v. Up self. Still awaiting plasma.  Pt updated on pain and pain management  - Manage/alleviate anxiety  - Utilize distraction and/or relaxation techniques  - Monitor for opioid side effects  - Notify MD/LIP if interventions unsuccessful or patient reports new pain  - Anticipate increased pain with acti services based on physician/LIP order or complex needs related to functional status, cognitive ability or social support system  Outcome: Progressing     Problem: Patient/Family Goals  Goal: Patient/Family Long Term Goal  Description: Patient's Long Term G

## 2020-11-10 NOTE — PROGRESS NOTES
ROBBIE HOSPITALIST  Progress Note     [facility] Patient Status:  Inpatient    1945 MRN [id number]   [facility] 5NW-A Attending Billie Floyd MD   Hosp Day # 3 PCP Viktoria Alvarez MD     Chief Complaint: cough    S: Patient on hours.         COVID-19 Lab Results    COVID-19  Lab Results   Component Value Date    COVID19 Detected (A) 11/07/2020       Pro-Calcitonin  Recent Labs   Lab 11/07/20  1540   PCT <0.05       Cardiac  Recent Labs   Lab 11/07/20  1540   TROP <0.045   PBNP 3

## 2020-11-11 VITALS
RESPIRATION RATE: 18 BRPM | DIASTOLIC BLOOD PRESSURE: 83 MMHG | HEART RATE: 54 BPM | BODY MASS INDEX: 23.21 KG/M2 | HEIGHT: 68 IN | TEMPERATURE: 98 F | SYSTOLIC BLOOD PRESSURE: 123 MMHG | WEIGHT: 153.13 LBS | OXYGEN SATURATION: 91 %

## 2020-11-11 PROCEDURE — 99239 HOSP IP/OBS DSCHRG MGMT >30: CPT | Performed by: INTERNAL MEDICINE

## 2020-11-11 RX ORDER — DEXAMETHASONE 6 MG/1
6 TABLET ORAL DAILY
Qty: 5 TABLET | Refills: 0 | Status: SHIPPED | OUTPATIENT
Start: 2020-11-11 | End: 2020-11-16 | Stop reason: CLARIF

## 2020-11-11 NOTE — PROGRESS NOTES
INFECTIOUS DISEASE PROGRESS NOTE    Ramila Romero Patient Status:  Inpatient    1945 MRN [id number]   [facility] 5NW-A Attending Billie Floyd MD   Hosp Day # 3 PCP Sylvia Villalba GFRAA 98 [phone]   GFRNAA 85 88 90   CA 9.1 9.3 8.8   ALB 3.7 3.5 3.0*   * 136 137   K 4.2 4.0 4.2    104 105   CO2 26.0 25.0 31.0   ALKPHO 81 73 63   AST 15 15 11*   ALT 20 20 20   BILT 0.7 0.4 0.3   TP 7.8 7.5 6.4     No results found for: proven  - dexa  - remdesivir  - ccp 11/8  - follow O2 sats, would consider actemra if develops hypoxia with O2 requirements above 6L, but only on 1L now and inflammatory markers not very elevated  - prone as able, otherwise encourage ambulation and IS  - t

## 2020-11-11 NOTE — PROGRESS NOTES
NURSING DISCHARGE NOTE    Discharged Home via wheelchair. Accompanied by Support staff  Belongings Taken by patient/family. Patient discharged home with wife. All discharge instructions, medications and follow up care discussed with patient.  Unders

## 2020-11-11 NOTE — DISCHARGE SUMMARY
[facility] HOSPITALIST  DISCHARGE SUMMARY     Dexter Romero Patient Status:  Inpatient    1945 MRN [id number]   [facility] 5NW-A Attending No att. providers found   Hosp Day # 4 PCP Rodrigo Rashid MD     Date of Admission: 2020 take these medications      Instructions Prescription details   Levothyroxine Sodium 125 MCG Tabs  What changed: additional instructions      TAKE 1 TABLET BY MOUTH DAILY IN THE MORNING AS DIRECTED   Quantity: 90 tablet  Refills: 2        CONTINUE taking t edema.  -----------------------------------------------------------------------------------------------  PATIENT DISCHARGE INSTRUCTIONS: See electronic chart    Salas Wallace MD    Time spent:  > 30 minutes

## 2020-11-11 NOTE — PLAN OF CARE
A/O x4. Vital signs stable. Tolerating RA with saturations of >90%. Tele-NSR. Voiding freely. No complaints of pain. Up self. IV remdesivir. PO decadron. Safety precautions in place. Pt updated on plan of care. WCTM.     Problem: RESPIRATORY - ADULT  Goal:

## 2020-11-12 ENCOUNTER — PATIENT OUTREACH (OUTPATIENT)
Dept: CASE MANAGEMENT | Age: 75
End: 2020-11-12

## 2020-11-12 DIAGNOSIS — Z02.9 ENCOUNTERS FOR ADMINISTRATIVE PURPOSE: ICD-10-CM

## 2020-11-12 PROCEDURE — 1111F DSCHRG MED/CURRENT MED MERGE: CPT | Performed by: CLINICAL NURSE SPECIALIST

## 2020-11-12 RX ORDER — LORATADINE 10 MG/1
10 TABLET ORAL
COMMUNITY
End: 2021-09-16 | Stop reason: ALTCHOICE

## 2020-11-12 NOTE — PAYOR COMM NOTE
--------------  DISCHARGE REVIEW    Payor: [facility] G[employer] [place] #:  [id number]  Authorization Number: [id number]    Admit date: 11/7/20  Admit time:  1913  Discharge Date: 11/11/2020 12:01 PM     Admitting Physician: DO Jeri Flood

## 2020-11-12 NOTE — PROGRESS NOTES
Initial Post Discharge Follow Up   Discharge Date: 11/11/20  Contact Date: 11/12/2020    Consent Verification:  Assessment Completed With: Patient  HIPAA Verified?   Yes    Discharge Dx:  LLEEJ-19 virus infection    Was TCC ordered: yes        General: yes  o Do you have any questions about your new medication? No  • Did you  your discharge medications when you left the hospital? Yes  • May I go over your medications with you to make sure we are not missing anything? yes  • Are there any reasons th PCP TCM/HFU appointment with pt:  Yes      Have you made all of your follow up appointments? yes    Is there any reason as to why you cannot make your appointments?    No     NCM Reviewed upcoming Specialist Appt with patient     Not Applicable     Interven

## 2020-11-16 ENCOUNTER — VIRTUAL PHONE E/M (OUTPATIENT)
Dept: INTERNAL MEDICINE CLINIC | Facility: CLINIC | Age: 75
End: 2020-11-16
Payer: COMMERCIAL

## 2020-11-16 ENCOUNTER — APPOINTMENT (OUTPATIENT)
Dept: GENERAL RADIOLOGY | Age: 75
End: 2020-11-16
Attending: EMERGENCY MEDICINE
Payer: MEDICARE

## 2020-11-16 ENCOUNTER — APPOINTMENT (OUTPATIENT)
Dept: CT IMAGING | Age: 75
End: 2020-11-16
Attending: EMERGENCY MEDICINE
Payer: MEDICARE

## 2020-11-16 ENCOUNTER — HOSPITAL ENCOUNTER (EMERGENCY)
Age: 75
Discharge: HOME | End: 2020-11-16
Attending: EMERGENCY MEDICINE
Payer: MEDICARE

## 2020-11-16 VITALS
OXYGEN SATURATION: 96 % | HEART RATE: 67 BPM | WEIGHT: 155 LBS | RESPIRATION RATE: 20 BRPM | HEIGHT: 68 IN | SYSTOLIC BLOOD PRESSURE: 165 MMHG | DIASTOLIC BLOOD PRESSURE: 89 MMHG | TEMPERATURE: 97 F | BODY MASS INDEX: 23.49 KG/M2

## 2020-11-16 DIAGNOSIS — U07.1 COVID-19 VIRUS INFECTION: Primary | ICD-10-CM

## 2020-11-16 DIAGNOSIS — C61 PROSTATE CANCER (HCC): ICD-10-CM

## 2020-11-16 DIAGNOSIS — E03.9 HYPOTHYROIDISM, UNSPECIFIED TYPE: ICD-10-CM

## 2020-11-16 DIAGNOSIS — I10 ESSENTIAL HYPERTENSION: ICD-10-CM

## 2020-11-16 DIAGNOSIS — R07.9 CHEST PAIN OF UNCERTAIN ETIOLOGY: Primary | ICD-10-CM

## 2020-11-16 PROCEDURE — 93010 ELECTROCARDIOGRAM REPORT: CPT

## 2020-11-16 PROCEDURE — 71275 CT ANGIOGRAPHY CHEST: CPT | Performed by: EMERGENCY MEDICINE

## 2020-11-16 PROCEDURE — 85610 PROTHROMBIN TIME: CPT | Performed by: EMERGENCY MEDICINE

## 2020-11-16 PROCEDURE — 99285 EMERGENCY DEPT VISIT HI MDM: CPT

## 2020-11-16 PROCEDURE — 71045 X-RAY EXAM CHEST 1 VIEW: CPT | Performed by: EMERGENCY MEDICINE

## 2020-11-16 PROCEDURE — 99441 PHONE E/M BY PHYS 5-10 MIN: CPT | Performed by: CLINICAL NURSE SPECIALIST

## 2020-11-16 PROCEDURE — 84484 ASSAY OF TROPONIN QUANT: CPT | Performed by: EMERGENCY MEDICINE

## 2020-11-16 PROCEDURE — 85025 COMPLETE CBC W/AUTO DIFF WBC: CPT | Performed by: EMERGENCY MEDICINE

## 2020-11-16 PROCEDURE — 93005 ELECTROCARDIOGRAM TRACING: CPT

## 2020-11-16 PROCEDURE — 80053 COMPREHEN METABOLIC PANEL: CPT | Performed by: EMERGENCY MEDICINE

## 2020-11-16 PROCEDURE — 36415 COLL VENOUS BLD VENIPUNCTURE: CPT

## 2020-11-16 PROCEDURE — 85730 THROMBOPLASTIN TIME PARTIAL: CPT | Performed by: EMERGENCY MEDICINE

## 2020-11-16 NOTE — PROGRESS NOTES
Virtual/Telephone Check-In  AUDIO ONLY    Mickey Romero verbally consents to a Virtual/Telephone Check-In service on 11/15/20.   Patient understands and accepts financial responsibility for any deductible, co-insurance and/or co-pays associated with this [address] [facility] [address] [id number] [facility] [address], 11/15/20, 7:35 PM

## 2020-11-16 NOTE — PROGRESS NOTES
TRANSITIONAL CARE CLINIC PHARMACIST MEDICATION RECONCILIATION        Sarah Heber MRN [id number]    1945 PCP Hood Hines MD       Comments: Medication history completed by the [facility] Pharmacist with the patient using max PharmD, 11/16/2020, 10:12 AM  Trans[facility]

## 2020-11-16 NOTE — ED PROVIDER NOTES
Patient Seen in: THE [facility] Emergency Department In [place]      History   Patient presents with:  Chest Pain Angina    Stated Complaint: Chest pain +positive covid    HPI    Yazmin Chilel is a pleasant 24-year-old male presenting to the emerge from for chest [facility] Alcohol/week: 0.0 standard drinks      Comment: 2-3  beers per night    Drug use: Yes      Types: Cannabis      Comment: 1 x per week             Review of Systems    Positive for stated complaint: Chest pain +positive covid  Other systems are as noted in RAINBOW DRAW LAVENDER   RAINBOW DRAW LIGHT GREEN   RAINBOW DRAW GOLD   CBC W/ DIFFERENTIAL     EKG    Rate, intervals and axes as noted on EKG Report.   Rate: 67  Rhythm: Sinus Rhythm  Reading: No areas of acute ST segment elevation or depression

## 2020-11-16 NOTE — ED INITIAL ASSESSMENT (HPI)
Pt + for covid. Was admitted to THE [facility] 11/7 for low O2 sats and discharged home. Called doctor c/o chest pain and was send to ED for further evaluation.

## 2020-11-18 ENCOUNTER — TELEPHONE (OUTPATIENT)
Dept: FAMILY MEDICINE CLINIC | Facility: CLINIC | Age: 75
End: 2020-11-18

## 2020-11-20 NOTE — TELEPHONE ENCOUNTER
Called patient's wife and advised we have been unable to reach patient. States he probably has his phone turned off. Was able to speak to patient who states he is feel good. Denies any pain or SOB.   States was in the hospital for 4-5 days and probably ov

## 2020-11-23 NOTE — TELEPHONE ENCOUNTER
Future Appointments   Date Time Provider Geoff Zamora   12/4/2020 11:40 AM Dameon Lucas MD EMG 21 EMG 75TH   3/9/2021 10:45 AM Micki Lira MD [address] [facility] HEM ONC Trell Chris   9/30/2021  1:00 PM Micki Lira MD [id number] [employer]

## 2020-12-04 ENCOUNTER — OFFICE VISIT (OUTPATIENT)
Dept: FAMILY MEDICINE CLINIC | Facility: CLINIC | Age: 75
End: 2020-12-04
Payer: COMMERCIAL

## 2020-12-04 VITALS
RESPIRATION RATE: 18 BRPM | HEART RATE: 84 BPM | HEIGHT: 68 IN | SYSTOLIC BLOOD PRESSURE: 128 MMHG | WEIGHT: 152 LBS | BODY MASS INDEX: 23.04 KG/M2 | OXYGEN SATURATION: 95 % | DIASTOLIC BLOOD PRESSURE: 68 MMHG | TEMPERATURE: 97 F

## 2020-12-04 DIAGNOSIS — Z51.81 ENCOUNTER FOR MEDICATION MONITORING: ICD-10-CM

## 2020-12-04 DIAGNOSIS — C61 PROSTATE CANCER (HCC): ICD-10-CM

## 2020-12-04 DIAGNOSIS — J43.2 CENTRILOBULAR EMPHYSEMA (HCC): ICD-10-CM

## 2020-12-04 DIAGNOSIS — I77.79 DISSECTION OF MESENTERIC ARTERY (HCC): ICD-10-CM

## 2020-12-04 DIAGNOSIS — I10 ESSENTIAL HYPERTENSION: ICD-10-CM

## 2020-12-04 DIAGNOSIS — U07.1 COVID-19 VIRUS INFECTION: Primary | ICD-10-CM

## 2020-12-04 DIAGNOSIS — E03.9 HYPOTHYROIDISM (ACQUIRED): ICD-10-CM

## 2020-12-04 PROCEDURE — 3074F SYST BP LT 130 MM HG: CPT | Performed by: FAMILY MEDICINE

## 2020-12-04 PROCEDURE — 3008F BODY MASS INDEX DOCD: CPT | Performed by: FAMILY MEDICINE

## 2020-12-04 PROCEDURE — 3078F DIAST BP <80 MM HG: CPT | Performed by: FAMILY MEDICINE

## 2020-12-04 PROCEDURE — 1111F DSCHRG MED/CURRENT MED MERGE: CPT | Performed by: FAMILY MEDICINE

## 2020-12-04 PROCEDURE — 99215 OFFICE O/P EST HI 40 MIN: CPT | Performed by: FAMILY MEDICINE

## 2020-12-04 NOTE — PROGRESS NOTES
From: Nicole López  To: Abigail Pérez MD  Sent: 8/2/2018 7:50 AM CDT  Subject: BP readings    Good Morning,   Last time I was in Dr. Pérez added another BP pill for HTN.    Have done couple of readings and they are 142/88 at blood drive and 136/90 at home.    I am only taking the amlodipine once a day as it caused way too much swelling of feet and ankles. So I take that at night and take Losartan in am and carvedilol bid. I am going to work weight loss which I know will help and I am to see her in December. Thank you.  Nicole     HPI:    Cornelia Malhotra is a 69year old male here today for hospital follow up.    He was discharged from Inpatient hospital, [facility] to Home   Admission Date: 11/7/2020  Discharge Date: 11/11/20  ER Visit: 11/16/2020  Hospital Discharge Diagnos O2 and discharged home on 11/11/2020 on dexamethasone 6 mg daily for 5 days, completed course. .  A few days later, patient noted some chest tightness with activity. Home blood pressure readings were also elevated in the 150/100 range.   Patient was seen in (1/31/2017); colonoscopy (6/2016); and skin surgery (10/2019). He family history includes Breast Cancer in his sister; High Blood Pressure in his sister; Hypertension in his father; Stroke in his father;  Thyroid Disorder in his father; allergic rhinitis BS's,soft, no masses, HSM or tenderness  MUSCULOSKELETAL: back is not tender, FROM of the extremities  EXTREMITIES: no cyanosis, clubbing or edema  NEURO: Oriented times three, cranial nerves are intact, motor and sensory are grossly intact    ASSESSMENT/ days/week. Plan recheck TFTs in 2 months.   -     ASSAY, THYROID STIM HORMONE  -     FREE T4 (FREE THYROXINE)    Dissection of mesenteric artery (HCC)  Reviewed incidental finding on CTA chest with chronic appearing dissection and pseudoaneurysm involving

## 2020-12-12 ENCOUNTER — HOSPITAL ENCOUNTER (OUTPATIENT)
Dept: CT IMAGING | Age: 75
Discharge: HOME | End: 2020-12-12
Attending: FAMILY MEDICINE
Payer: MEDICARE

## 2020-12-12 DIAGNOSIS — I77.79 DISSECTION OF MESENTERIC ARTERY (HCC): ICD-10-CM

## 2020-12-12 PROCEDURE — 74174 CTA ABD&PLVS W/CONTRAST: CPT | Performed by: FAMILY MEDICINE

## 2020-12-12 PROCEDURE — 82565 ASSAY OF CREATININE: CPT

## 2020-12-15 ENCOUNTER — TELEPHONE (OUTPATIENT)
Dept: FAMILY MEDICINE CLINIC | Facility: CLINIC | Age: 75
End: 2020-12-15

## 2020-12-15 DIAGNOSIS — I77.79 DISSECTION OF MESENTERIC ARTERY (HCC): Primary | ICD-10-CM

## 2020-12-15 DIAGNOSIS — I72.9 PSEUDOANEURYSM (HCC): ICD-10-CM

## 2020-12-15 PROBLEM — R09.02 HYPOXIA: Status: RESOLVED | Noted: 2020-11-07 | Resolved: 2020-12-15

## 2020-12-15 NOTE — TELEPHONE ENCOUNTER
Noted  Viewed by Eliz Reynaga on 12/15/2020  1:56 PM  Written by Brianne Silva MD on 12/15/2020  1:55 PM  CT scan shows findings of a focal dilatation (aneurysm) of the superior mesenteric artery.  This looks stable compared to previous CT scan, an

## 2020-12-15 NOTE — TELEPHONE ENCOUNTER
Result note sent to patient. Referring to vascular specialist.  Please make sure patient views result note and is aware to schedule appointment with specialist as referred.

## 2020-12-18 NOTE — TELEPHONE ENCOUNTER
Returning pt's wife, [facility] per HIPAA), call.   Called and spoke with wife to review CT findings, and provided vascular specialist contact info:    Kumar Mcconnell MD [address] [place] [address] [phone]     No further questions

## 2020-12-20 ENCOUNTER — HOSPITAL ENCOUNTER (OUTPATIENT)
Facility: HOSPITAL | Age: 75
Setting detail: OBSERVATION
LOS: 2 days | Discharge: HOME | End: 2020-12-24
Attending: EMERGENCY MEDICINE | Admitting: HOSPITALIST
Payer: MEDICARE

## 2020-12-20 DIAGNOSIS — R31.9 HEMATURIA: ICD-10-CM

## 2020-12-20 DIAGNOSIS — R31.0 GROSS HEMATURIA: ICD-10-CM

## 2020-12-20 DIAGNOSIS — Z85.46 HISTORY OF PROSTATE CANCER: ICD-10-CM

## 2020-12-20 DIAGNOSIS — R33.9 URINARY RETENTION: Primary | ICD-10-CM

## 2020-12-20 PROBLEM — R73.9 HYPERGLYCEMIA: Status: ACTIVE | Noted: 2020-12-20

## 2020-12-20 PROCEDURE — 99220 INITIAL OBSERVATION CARE,LEVL III: CPT | Performed by: INTERNAL MEDICINE

## 2020-12-20 RX ORDER — LIDOCAINE HYDROCHLORIDE 20 MG/ML
5 JELLY TOPICAL ONCE
Status: COMPLETED | OUTPATIENT
Start: 2020-12-20 | End: 2020-12-20

## 2020-12-20 RX ORDER — METOCLOPRAMIDE HYDROCHLORIDE 5 MG/ML
10 INJECTION INTRAMUSCULAR; INTRAVENOUS EVERY 8 HOURS PRN
Status: DISCONTINUED | OUTPATIENT
Start: 2020-12-20 | End: 2020-12-24

## 2020-12-20 RX ORDER — SODIUM CHLORIDE 9 MG/ML
INJECTION, SOLUTION INTRAVENOUS CONTINUOUS
Status: ACTIVE | OUTPATIENT
Start: 2020-12-20 | End: 2020-12-20

## 2020-12-20 RX ORDER — LIDOCAINE HYDROCHLORIDE 20 MG/ML
JELLY TOPICAL
Status: COMPLETED
Start: 2020-12-20 | End: 2020-12-20

## 2020-12-20 RX ORDER — ONDANSETRON 2 MG/ML
4 INJECTION INTRAMUSCULAR; INTRAVENOUS EVERY 4 HOURS PRN
Status: DISCONTINUED | OUTPATIENT
Start: 2020-12-20 | End: 2020-12-20

## 2020-12-20 RX ORDER — LEVOTHYROXINE SODIUM 0.12 MG/1
125 TABLET ORAL
Status: DISCONTINUED | OUTPATIENT
Start: 2020-12-20 | End: 2020-12-24

## 2020-12-20 RX ORDER — SODIUM CHLORIDE 9 MG/ML
INJECTION, SOLUTION INTRAVENOUS CONTINUOUS
Status: DISCONTINUED | OUTPATIENT
Start: 2020-12-20 | End: 2020-12-24

## 2020-12-20 RX ORDER — ACETAMINOPHEN 325 MG/1
650 TABLET ORAL EVERY 6 HOURS PRN
Status: DISCONTINUED | OUTPATIENT
Start: 2020-12-20 | End: 2020-12-24

## 2020-12-20 RX ORDER — ONDANSETRON 2 MG/ML
4 INJECTION INTRAMUSCULAR; INTRAVENOUS EVERY 6 HOURS PRN
Status: DISCONTINUED | OUTPATIENT
Start: 2020-12-20 | End: 2020-12-24

## 2020-12-20 RX ORDER — MAGNESIUM HYDROXIDE 1200 MG/15ML
3000 LIQUID ORAL CONTINUOUS
Status: DISCONTINUED | OUTPATIENT
Start: 2020-12-20 | End: 2020-12-24

## 2020-12-20 RX ORDER — LORAZEPAM 2 MG/ML
1 INJECTION INTRAMUSCULAR ONCE
Status: COMPLETED | OUTPATIENT
Start: 2020-12-20 | End: 2020-12-20

## 2020-12-20 NOTE — ED PROVIDER NOTES
Patient Seen in: THE [facility] Emergency Department In [place]      History   Patient presents with:  Urinary Symptoms    Stated Complaint: hematuria    HPI      Painless hematuria started this morning- then felt like he suddenly could not urinate-actually c hematuria  Other systems are as noted in HPI. Constitutional and vital signs reviewed. All other systems reviewed and negative except as noted above.     Physical Exam     ED Triage Vitals [12/20/20 0950]   BP (!) 157/91   Pulse 82   Resp 16   Temp 97 Findings: No erythema or rash. Neurological:      Mental Status: He is alert and oriented to person, place, and time. Cranial Nerves: No cranial nerve deficit.       Coordination: Coordination normal.   Psychiatric:         Behavior: Behavior normal. obtained pre- and post- injection of non-ionic intravenous contrast material. Multi-planar reformatted/3-D images were created to optimize visualization of vascular anatomy. Dose reduction techniques   were used.  Dose information is transmitted to the ACR Prostatectomy. Urinary bladder is incompletely distended. There is a small left posterior lateral 8 mm bladder diverticulum   BONES:  Demineralization of the lumbar vertebral bodies and pelvis. Schmorl's nodes at L4 level.   Mild degenerative changes in have a history of prostate cancer, he has been in remission but recently his PSA again began rising he sees Dr. Tiffanie Rosario from hematology.   We will discussed the case with the [facility] hospitalist and dr Gomez Paige and [address] [place] [address]

## 2020-12-20 NOTE — ED INITIAL ASSESSMENT (HPI)
Pt reports hematuria since last noc with 3 episodes of dark blood since onset. Pt denies pain or urinary sxs.   Pt has hx of covid in November and was hospitalized 5 days, pt also has hx of prostate CA

## 2020-12-21 ENCOUNTER — APPOINTMENT (OUTPATIENT)
Dept: ULTRASOUND IMAGING | Facility: HOSPITAL | Age: 75
End: 2020-12-21
Attending: PHYSICIAN ASSISTANT
Payer: MEDICARE

## 2020-12-21 PROCEDURE — 99225 SUBSEQUENT OBSERVATION CARE: CPT | Performed by: HOSPITALIST

## 2020-12-21 PROCEDURE — 76857 US EXAM PELVIC LIMITED: CPT | Performed by: PHYSICIAN ASSISTANT

## 2020-12-21 RX ORDER — DOCUSATE SODIUM 100 MG/1
100 CAPSULE, LIQUID FILLED ORAL 2 TIMES DAILY
Status: DISCONTINUED | OUTPATIENT
Start: 2020-12-21 | End: 2020-12-24

## 2020-12-21 NOTE — PROGRESS NOTES
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 24 hours   Discharge is

## 2020-12-21 NOTE — PLAN OF CARE
Patient A&O x4, lungs clear, no c/o pain. CBI running at moderate rate through out night. Urine dark red to start shift and lightened up to a light cherry red, rate slowed and urine back to dark red, rate increased again.

## 2020-12-21 NOTE — H&P
ROBBIE HOSPITALIST  History and Physical     Deirdre Em Patient Status:  Emergency    1945 MRN [id number]   Location [address] [place] [address] Attending Franklyn Milan MD   Hosp Day # 0 PCP Abby Egan MD     Chief C Cannabis.     Family History:   Family History   Problem Relation Age of Onset   • Other (allergic rhinitis) Sister    • Breast Cancer Sister    • Hypertension Father    • Stroke Father    • Thyroid Disorder Father    • Other (rheumatic heart disease) Geeta 15.0   .4*   .0       Recent Labs   Lab 12/20/20  1014   *   BUN 14   CREATSERUM 0.90   GFRAA 98   GFRNAA 85   CA 9.3   ALB 4.0      K 4.2      CO2 29.0   ALKPHO 79   AST 12*   ALT 21   BILT 0.6   TP 7.5       Estimated Cre

## 2020-12-21 NOTE — PLAN OF CARE
Pt alert x 4. Very friendly man with no complaints. CBI infusing./ Urology in this am & irrigated blood clots out of bladder. Urine is light pink. Pt had US of bladder today. Is not on isolation. Tolerating diet.

## 2020-12-21 NOTE — PROGRESS NOTES
NURSING ADMISSION NOTE      Patient admitted via Wheelchair  Oriented to room. Safety precautions initiated. Bed in low position. Call light in reach. Patient admitted from [address] [place] [address], transported by spouse via private car.  3 way Antonieta peck

## 2020-12-21 NOTE — CONSULTS
[facility] Urology   Consultation Note    Dotty Romero Patient Status:  Inpatient    1945 MRN [id number]   [facility] 4NW-A Attending Florina Finney MD   Hosp Day # 1 PCP Daya Marinelli MD     Reason for Consultation: Sister    • Hypertension Father    • Stroke Father    • Thyroid Disorder Father    • Other (rheumatic heart disease) Mother    • High Blood Pressure Sister       reports that he quit smoking about 5 years ago. He has a 25.00 pack-year smoking history.  He q BILT 0.6 12/20/2020    TP 7.5 12/20/2020    AST 12 12/20/2020    ALT 21 12/20/2020     Lab Results   Component Value Date/Time    PSA 0.02 10/01/2020 03:04 PM    PSA <0.01 05/06/2019 10:13 AM    PSA <0.01 (L) 10/19/2018 12:38 PM    PSA <0.01 (L) 02/08/2 Normal caliber appendix. Uncomplicated colonic diverticulosis. Normal caliber small bowel loops. No evidence of wall thickening. ABDOMINAL WALL:  Small fat containing umbilical hernia. Bilateral fat containing small inguinal hernias. Abbie Band   PELVIC ORGANS: ablation  -PSA 5/6/19 <0.01 ng/mL-->10/1/20: 0.02 ng/mL - will need to be followed as outpatient.  -Quit smoking 5 years ago  -CTA abdomen/pelvis: no hydronephrosis    Recommendations:  -Discussed differential diagnosis of gross hematuria including radiati

## 2020-12-22 PROCEDURE — 99225 SUBSEQUENT OBSERVATION CARE: CPT | Performed by: HOSPITALIST

## 2020-12-22 NOTE — CM/SW NOTE
Patient failed inpatient criteria. Second level of review completed and supports observation. UR committee in agreement. Discussed with Dr. Christy Matt who approves observation status. MOON given to the patient and order written.

## 2020-12-22 NOTE — PLAN OF CARE
A&Ox4, RA sat at 94%, tele NSR. IS receiving CBI via triple lumen catheter. Irrigating NS w/ pink lemonade color return w/ a few clots. Patient denies pain or irritation, stated it was uncomfortable. No complaints of spasms.  Receiving NS in RAC IV @ 100ml/ intramuscular injections, enemas and rectal medication administration  - Ensure safe mobilization of patient  - Hold pressure on venipuncture sites to achieve adequate hemostasis  - Assess for signs and symptoms of internal bleeding  - Monitor lab trends

## 2020-12-22 NOTE — PROGRESS NOTES
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

## 2020-12-22 NOTE — PROGRESS NOTES
[facility]  Urology Progress Note    Maris Romero Patient Status:  Inpatient    1945 MRN [id number]   [facility] 4NW-A Attending Marilu Guajardo MD   Hosp Day # 2 PCP Zakia Roach MD     Subjective:  Antonino Santoyo is a

## 2020-12-22 NOTE — PAYOR COMM NOTE
ROBBIE CASTANO STATUS    --------------  ADMISSION REVIEW     Payor: [address] [place] [address] #:  [id number]  Authorization Number: [id number]

## 2020-12-23 ENCOUNTER — ANESTHESIA EVENT (OUTPATIENT)
Dept: SURGERY | Facility: HOSPITAL | Age: 75
End: 2020-12-23
Payer: MEDICARE

## 2020-12-23 ENCOUNTER — ANESTHESIA (OUTPATIENT)
Dept: SURGERY | Facility: HOSPITAL | Age: 75
End: 2020-12-23
Payer: MEDICARE

## 2020-12-23 ENCOUNTER — APPOINTMENT (OUTPATIENT)
Dept: GENERAL RADIOLOGY | Facility: HOSPITAL | Age: 75
End: 2020-12-23
Attending: UROLOGY
Payer: MEDICARE

## 2020-12-23 PROCEDURE — 3E1K88Z IRRIGATION OF GENITOURINARY TRACT USING IRRIGATING SUBSTANCE, VIA NATURAL OR ARTIFICIAL OPENING ENDOSCOPIC: ICD-10-PCS | Performed by: UROLOGY

## 2020-12-23 PROCEDURE — 0TBB8ZX EXCISION OF BLADDER, VIA NATURAL OR ARTIFICIAL OPENING ENDOSCOPIC, DIAGNOSTIC: ICD-10-PCS | Performed by: UROLOGY

## 2020-12-23 PROCEDURE — 99225 SUBSEQUENT OBSERVATION CARE: CPT | Performed by: HOSPITALIST

## 2020-12-23 RX ORDER — PHENYLEPHRINE HCL 10 MG/ML
VIAL (ML) INJECTION AS NEEDED
Status: DISCONTINUED | OUTPATIENT
Start: 2020-12-23 | End: 2020-12-23 | Stop reason: SURG

## 2020-12-23 RX ORDER — ONDANSETRON 2 MG/ML
INJECTION INTRAMUSCULAR; INTRAVENOUS AS NEEDED
Status: DISCONTINUED | OUTPATIENT
Start: 2020-12-23 | End: 2020-12-23 | Stop reason: SURG

## 2020-12-23 RX ORDER — CEFAZOLIN SODIUM/WATER 2 G/20 ML
2 SYRINGE (ML) INTRAVENOUS
Status: COMPLETED | OUTPATIENT
Start: 2020-12-23 | End: 2020-12-23

## 2020-12-23 RX ORDER — HYDROMORPHONE HYDROCHLORIDE 1 MG/ML
0.4 INJECTION, SOLUTION INTRAMUSCULAR; INTRAVENOUS; SUBCUTANEOUS EVERY 5 MIN PRN
Status: DISCONTINUED | OUTPATIENT
Start: 2020-12-23 | End: 2020-12-23 | Stop reason: HOSPADM

## 2020-12-23 RX ORDER — MEPERIDINE HYDROCHLORIDE 25 MG/ML
12.5 INJECTION INTRAMUSCULAR; INTRAVENOUS; SUBCUTANEOUS AS NEEDED
Status: DISCONTINUED | OUTPATIENT
Start: 2020-12-23 | End: 2020-12-23 | Stop reason: HOSPADM

## 2020-12-23 RX ORDER — ONDANSETRON 2 MG/ML
4 INJECTION INTRAMUSCULAR; INTRAVENOUS AS NEEDED
Status: DISCONTINUED | OUTPATIENT
Start: 2020-12-23 | End: 2020-12-23 | Stop reason: HOSPADM

## 2020-12-23 RX ORDER — DEXAMETHASONE SODIUM PHOSPHATE 4 MG/ML
VIAL (ML) INJECTION AS NEEDED
Status: DISCONTINUED | OUTPATIENT
Start: 2020-12-23 | End: 2020-12-23 | Stop reason: SURG

## 2020-12-23 RX ORDER — SODIUM CHLORIDE, SODIUM LACTATE, POTASSIUM CHLORIDE, CALCIUM CHLORIDE 600; 310; 30; 20 MG/100ML; MG/100ML; MG/100ML; MG/100ML
INJECTION, SOLUTION INTRAVENOUS CONTINUOUS
Status: DISCONTINUED | OUTPATIENT
Start: 2020-12-23 | End: 2020-12-23 | Stop reason: HOSPADM

## 2020-12-23 RX ORDER — ALBUTEROL SULFATE 2.5 MG/3ML
2.5 SOLUTION RESPIRATORY (INHALATION) AS NEEDED
Status: DISCONTINUED | OUTPATIENT
Start: 2020-12-23 | End: 2020-12-23 | Stop reason: HOSPADM

## 2020-12-23 RX ORDER — HYDROCODONE BITARTRATE AND ACETAMINOPHEN 5; 325 MG/1; MG/1
2 TABLET ORAL AS NEEDED
Status: DISCONTINUED | OUTPATIENT
Start: 2020-12-23 | End: 2020-12-23 | Stop reason: HOSPADM

## 2020-12-23 RX ORDER — ACETAMINOPHEN 500 MG
1000 TABLET ORAL ONCE AS NEEDED
Status: DISCONTINUED | OUTPATIENT
Start: 2020-12-23 | End: 2020-12-23 | Stop reason: HOSPADM

## 2020-12-23 RX ORDER — LIDOCAINE HYDROCHLORIDE 20 MG/ML
JELLY TOPICAL AS NEEDED
Status: DISCONTINUED | OUTPATIENT
Start: 2020-12-23 | End: 2020-12-23 | Stop reason: HOSPADM

## 2020-12-23 RX ORDER — HYDROCODONE BITARTRATE AND ACETAMINOPHEN 5; 325 MG/1; MG/1
1 TABLET ORAL AS NEEDED
Status: DISCONTINUED | OUTPATIENT
Start: 2020-12-23 | End: 2020-12-23 | Stop reason: HOSPADM

## 2020-12-23 RX ORDER — NALOXONE HYDROCHLORIDE 0.4 MG/ML
80 INJECTION, SOLUTION INTRAMUSCULAR; INTRAVENOUS; SUBCUTANEOUS AS NEEDED
Status: DISCONTINUED | OUTPATIENT
Start: 2020-12-23 | End: 2020-12-23 | Stop reason: HOSPADM

## 2020-12-23 RX ORDER — LIDOCAINE HYDROCHLORIDE 10 MG/ML
INJECTION, SOLUTION EPIDURAL; INFILTRATION; INTRACAUDAL; PERINEURAL AS NEEDED
Status: DISCONTINUED | OUTPATIENT
Start: 2020-12-23 | End: 2020-12-23 | Stop reason: SURG

## 2020-12-23 RX ADMIN — PHENYLEPHRINE HCL 200 MCG: 10 MG/ML VIAL (ML) INJECTION at 16:07:00

## 2020-12-23 RX ADMIN — DEXAMETHASONE SODIUM PHOSPHATE 4 MG: 4 MG/ML VIAL (ML) INJECTION at 15:49:00

## 2020-12-23 RX ADMIN — PHENYLEPHRINE HCL 200 MCG: 10 MG/ML VIAL (ML) INJECTION at 16:02:00

## 2020-12-23 RX ADMIN — ONDANSETRON 4 MG: 2 INJECTION INTRAMUSCULAR; INTRAVENOUS at 16:11:00

## 2020-12-23 RX ADMIN — LIDOCAINE HYDROCHLORIDE 50 MG: 10 INJECTION, SOLUTION EPIDURAL; INFILTRATION; INTRACAUDAL; PERINEURAL at 15:49:00

## 2020-12-23 RX ADMIN — SODIUM CHLORIDE: 9 INJECTION, SOLUTION INTRAVENOUS at 16:24:00

## 2020-12-23 RX ADMIN — CEFAZOLIN SODIUM/WATER 2 G: 2 G/20 ML SYRINGE (ML) INTRAVENOUS at 15:55:00

## 2020-12-23 NOTE — PLAN OF CARE
Assumed care of pt @ 5297. Pt is A/Ox4, VSS. On RA SPO2 96-98%. SR on tele. 3-way zuniga with CBI, pink to cherry output, with minimal clots. Pt denies pain or spasms. Unable to clamp irrigation as slowing the rate of irrigation resulted cherry red output. bleeding injury  Description: (Example usage: patient with low platelets)  INTERVENTIONS:  - Avoid intramuscular injections, enemas and rectal medication administration  - Ensure safe mobilization of patient  - Hold pressure on venipuncture sites to [employer]

## 2020-12-23 NOTE — ANESTHESIA PROCEDURE NOTES
Airway  Urgency: elective      General Information and Staff    Patient location during procedure: OR  Anesthesiologist: Jola Boxer, MD  Resident/CRNA: William Yi CRNA  Performed: CRNA     Indications and Patient Condition  Indications for airw

## 2020-12-23 NOTE — ANESTHESIA PREPROCEDURE EVALUATION
PRE-OP EVALUATION    Patient Name: Sarah Buck    Pre-op Diagnosis: Hematuria [R31.9]    Procedure(s):  CYSTOSCOPY, CLOT EVACUATION, POSSIBLE BLADDER BIOPSY    Surgeon(s) and Role:     Jesus Gaviria MD - Primary    Pre-op vitals reviewed.   Temp: GI/Hepatic/Renal  Comment: Prostate cancer s/p prostatectomy 2016                               Cardiovascular                  (+) hypertension   (+) hyperlipidemia                                  Endo/Other           (+) hypothyroidism 11/20/2020     12/21/2020     11/20/2020    CO2 26.0 12/21/2020    CO2 28 11/20/2020    BUN 9 12/21/2020    BUN 15 11/20/2020    CREATSERUM 0.62 (L) 12/21/2020    CREATSERUM 0.94 11/20/2020     (H) 12/21/2020    GLU 82 11/20/2020    CA 8

## 2020-12-23 NOTE — PROGRESS NOTES
ROBBIE HOSPITALIST  Progress Note     Jazz Morillo Patient Status:  Inpatient    1945 MRN [id number]   [facility] 4NW-A Attending Donna Lang MD   Hosp Day # 2 PCP Ludy Hernandez MD     Chief Complaint: hematuria   S:  Ciera Mahmood 4. To the OR this afternoon for Cystoscopy   5. Pt acceptable risk for OR   2. Prostate Cancer- as above   3. Hypothyroid- synthroid   4. Dyslipidemia- diet control   5.  Chronic Stable dissection of proximal superior mesenteric artery    Appreciate urolo

## 2020-12-23 NOTE — PROGRESS NOTES
[facility]  Urology Progress Note    Jeanna Romero Patient Status:  Observation    1945 MRN [id number]   [facility] 4NW-A Attending Tonya Ruff MD   Hosp Day # 2 PCP Vidhya Diamond MD     Subjective:  Army Smith is

## 2020-12-23 NOTE — ANESTHESIA POSTPROCEDURE EVALUATION
[address] [place] [address] Patient Status:  Observation   Age/Gender 70year old male MRN [id number]   Location [address] [facility] Attending Ileana Garrido MD   [facility] Day # 2 PCP Nathaly Ojeda MD       Anesthesia Post-op N

## 2020-12-23 NOTE — OPERATIVE REPORT
Operative Note    Patient Name: Mr. Yuko Black    Preoperative Diagnosis: History of localized prostate cancer post radical prostatectomy by Dr. Julio Odom, adjuvant ADT/radiotherapy, gross hematuria with continuous bladder irrigation demand, likely ret This was cold cup biopsied and fulguration controlled all bleeding and biopsy site cautery.   Because of urothelial neoplasm, bilateral retrograde pyelography was performed with a 5 [place] open-ended ureteric catheter demonstrating delicate intrarenal and u

## 2020-12-23 NOTE — PLAN OF CARE
A&Ox4, RA ,tele- NSR, -91-94. BM today, requested stool softener. RFA IV NS 100ml/hr. CBI infusing via triple lumen catheter, drainage pink lemonade color to pink tinged at times. Will continue irrigating until there is notable clear drainage.  NPO at m

## 2020-12-24 VITALS
SYSTOLIC BLOOD PRESSURE: 110 MMHG | RESPIRATION RATE: 25 BRPM | TEMPERATURE: 97 F | HEART RATE: 93 BPM | BODY MASS INDEX: 23 KG/M2 | WEIGHT: 148 LBS | OXYGEN SATURATION: 95 % | DIASTOLIC BLOOD PRESSURE: 73 MMHG

## 2020-12-24 PROCEDURE — 99217 OBSERVATION CARE DISCHARGE: CPT | Performed by: INTERNAL MEDICINE

## 2020-12-24 NOTE — PLAN OF CARE
Fung removed @ 0800. Patient voided 5x, leonor urine, post void residual zero. Patient's vital signs stable.

## 2020-12-24 NOTE — DISCHARGE SUMMARY
[facility] HOSPITALIST  DISCHARGE SUMMARY     Maris Romero Patient Status:  Observation    1945 MRN [id number]   [facility] 4NW-A Attending No att. providers found   Hosp Day # 2 PCP Zakia Roach MD     Date of Admission: 20 evacuation, bladder biopsy, fulguration, bilateral RGPG, Fung catheter insertion    Incidental or significant findings and recommendations (brief descriptions):  • Urothelial neoplasm    Lab/Test results pending at Discharge:   · None    Consultants:  • U DISCHARGE INSTRUCTIONS: See electronic chart    Hernandez Muller DO    Time spent:  > 30 minutes

## 2020-12-24 NOTE — PLAN OF CARE
Assumed care of pt @ 9466. Pt is A/Ox4, VSS. On RA SPO2 96-98%. SR on tele. 3-way zuniga with CBI, pink to cherry output, with minimal clots. After cysto CBI going in slow with clear/yellow/pink tinged UO. Pt denies pain or spasms. VF infusing.  Pt tolerate platelets)  INTERVENTIONS:  - Avoid intramuscular injections, enemas and rectal medication administration  - Ensure safe mobilization of patient  - Hold pressure on venipuncture sites to achieve adequate hemostasis  - Assess for signs and symptoms of inter

## 2020-12-24 NOTE — PROGRESS NOTES
[facility]  Urology Progress Note    Jeanna Purcellgloria Patient Status:  Observation    1945 MRN [id number]   [facility] 4NW-A Attending Elias Harris, [facility] Day # 2 PCP Vidhya Diamond MD     Subjective:   Luis is

## 2020-12-24 NOTE — PROGRESS NOTES
Patient seen and examined. Medically stable for discharge. Will have nurse reach out to urology regarding aspirin on discharge. Discharge summary to follow.     Jose Hong, DO

## 2020-12-24 NOTE — PLAN OF CARE
Patient alert and oriented x4. Vital signs stable. Afebrile. Tele SNR. Room air. No SOB. No chest pain. Maintained on CBI s/p Cystoscopy and Blood clot evacuation 12/23. CBI output noted  light pink-yellow clear. No blood clots. No complaints of pain.  Per appropriate  Outcome: Progressing  Goal: Free from bleeding injury  Description: (Example usage: patient with low platelets)  INTERVENTIONS:  - Avoid intramuscular injections, enemas and rectal medication administration  - Ensure safe mobilization of patie

## 2020-12-29 ENCOUNTER — PATIENT OUTREACH (OUTPATIENT)
Dept: CASE MANAGEMENT | Age: 75
End: 2020-12-29

## 2020-12-29 DIAGNOSIS — Z02.9 ENCOUNTERS FOR ADMINISTRATIVE PURPOSE: ICD-10-CM

## 2020-12-29 PROCEDURE — 1111F DSCHRG MED/CURRENT MED MERGE: CPT

## 2020-12-29 NOTE — PROGRESS NOTES
Initial Post Discharge Follow Up   Discharge Date: 12/24/20  Contact Date: 12/29/2020    Consent Verification:  Assessment Completed With: Patient  HIPAA Verified?   Yes    Discharge Dx:   Urinary retention    Was TCC ordered: no        General:   • How home, are there any needs or concerns you need addressed before your next visit with your PCP?  (DME, meds, disease concerns, Etc): No     Follow up appointments:      Your appointments     Date & Time Appointment Department [facility])    Feb 12, 2021 11:30 Appt with patient     Yes       Interventions by NCM: NCM reviewed discharge instructions and when to seek medical attention with the patient. He states that he is feeling much better with the Fung removed.  NCM instructed on s/s of infection, which he den Statement Selected

## 2021-01-05 ENCOUNTER — OFFICE VISIT (OUTPATIENT)
Dept: FAMILY MEDICINE CLINIC | Facility: CLINIC | Age: 76
End: 2021-01-05
Payer: COMMERCIAL

## 2021-01-05 VITALS
WEIGHT: 151 LBS | RESPIRATION RATE: 16 BRPM | TEMPERATURE: 97 F | SYSTOLIC BLOOD PRESSURE: 144 MMHG | DIASTOLIC BLOOD PRESSURE: 86 MMHG | HEIGHT: 68 IN | HEART RATE: 80 BPM | OXYGEN SATURATION: 92 % | BODY MASS INDEX: 22.88 KG/M2

## 2021-01-05 DIAGNOSIS — E03.9 HYPOTHYROIDISM, UNSPECIFIED TYPE: ICD-10-CM

## 2021-01-05 DIAGNOSIS — C61 PROSTATE CANCER (HCC): ICD-10-CM

## 2021-01-05 DIAGNOSIS — C67.9 UROTHELIAL CARCINOMA OF BLADDER (HCC): Primary | ICD-10-CM

## 2021-01-05 DIAGNOSIS — J43.2 CENTRILOBULAR EMPHYSEMA (HCC): ICD-10-CM

## 2021-01-05 DIAGNOSIS — I10 ESSENTIAL HYPERTENSION: ICD-10-CM

## 2021-01-05 DIAGNOSIS — I77.79 DISSECTION OF MESENTERIC ARTERY (HCC): ICD-10-CM

## 2021-01-05 PROCEDURE — 1111F DSCHRG MED/CURRENT MED MERGE: CPT | Performed by: FAMILY MEDICINE

## 2021-01-05 PROCEDURE — 3077F SYST BP >= 140 MM HG: CPT | Performed by: FAMILY MEDICINE

## 2021-01-05 PROCEDURE — 99495 TRANSJ CARE MGMT MOD F2F 14D: CPT | Performed by: FAMILY MEDICINE

## 2021-01-05 PROCEDURE — 3079F DIAST BP 80-89 MM HG: CPT | Performed by: FAMILY MEDICINE

## 2021-01-05 PROCEDURE — 3008F BODY MASS INDEX DOCD: CPT | Performed by: FAMILY MEDICINE

## 2021-01-05 RX ORDER — LISINOPRIL 5 MG/1
5 TABLET ORAL DAILY
Qty: 30 TABLET | Refills: 2 | Status: SHIPPED | OUTPATIENT
Start: 2021-01-05 | End: 2021-01-19

## 2021-01-05 NOTE — PROGRESS NOTES
HPI:    Polly Cottrell is a 70year old male here today for hospital follow up.    He was discharged from Inpatient hospital, [facility] to Home   Admission Date: 12/20/20   Discharge Date: 12/24/20  Hospital Discharge Diagnoses (since 12/6/2020) Meds:    •  loratadine 10 MG Oral Tab, Take 10 mg by mouth daily as needed. •  Cholecalciferol (VITAMIN D3) 1000 UNITS Oral Cap, Take 1 tablet by mouth daily. No current facility-administered medications on file prior to visit.          HISTORY: rec allergy or asthma    PHYSICAL EXAM:   No LMP for male patient. Estimated body mass index is 22.96 kg/m² as calculated from the following:    Height as of this encounter: 5' 8\" (1.727 m). Weight as of this encounter: 151 lb (68.5 kg).    /86   Pul diet/exercise/BP goals  Start Lisinopril 5mg daily  Monitor home readings.      Dissection of mesenteric artery (HCC)  CT scan shows findings of a focal dilatation (aneurysm) of the superior mesenteric artery.  This looks stable compared to previous CT scan

## 2021-01-11 RX ORDER — LEVOTHYROXINE SODIUM 0.12 MG/1
TABLET ORAL
Qty: 90 TABLET | Refills: 0 | Status: SHIPPED | OUTPATIENT
Start: 2021-01-11 | End: 2021-04-14

## 2021-01-11 NOTE — TELEPHONE ENCOUNTER
LOV 1/5/2021    LAST LAB 11/20/2020    LAST RX   Levothyroxine Sodium 125 MCG Oral Tab 90 tablet 2 1/27/2020         Next OV   Future Appointments   Date Time Provider Geoff Zamora   2/12/2021 11:30 AM Mindy BARRY MD [facility]   2/19/2

## 2021-01-14 PROBLEM — U07.1 COVID-19 VIRUS INFECTION: Status: RESOLVED | Noted: 2020-11-07 | Resolved: 2021-01-14

## 2021-01-19 RX ORDER — LISINOPRIL 5 MG/1
5 TABLET ORAL DAILY
Qty: 90 TABLET | Refills: 0 | Status: SHIPPED | OUTPATIENT
Start: 2021-01-19 | End: 2021-03-30

## 2021-01-19 NOTE — TELEPHONE ENCOUNTER
[place] calling to get prescription updated.  States insurance will only cover 90 day supply of the Lisinopril 5 MG

## 2021-01-19 NOTE — TELEPHONE ENCOUNTER
Dr. José Luu,  Please advise if OK to change rx to #90 for insurance coverage. Order pended    lisinopril 5 MG Oral Tab 30 tablet 2 1/5/2021    Sig:   Take 1 tablet (5 mg total) by mouth daily.

## 2021-02-02 DIAGNOSIS — Z23 NEED FOR VACCINATION: ICD-10-CM

## 2021-02-11 ENCOUNTER — IMMUNIZATION (OUTPATIENT)
Dept: LAB | Age: 76
End: 2021-02-11
Attending: HOSPITALIST
Payer: MEDICARE

## 2021-02-11 DIAGNOSIS — Z23 NEED FOR VACCINATION: Primary | ICD-10-CM

## 2021-02-11 PROCEDURE — 0001A SARSCOV2 VAC 30MCG/0.3ML IM: CPT

## 2021-02-18 LAB
T4, FREE: 1.7 NG/DL (ref 0.8–1.8)
TSH: 0.41 MIU/L (ref 0.4–4.5)

## 2021-02-19 ENCOUNTER — OFFICE VISIT (OUTPATIENT)
Dept: FAMILY MEDICINE CLINIC | Facility: CLINIC | Age: 76
End: 2021-02-19
Payer: COMMERCIAL

## 2021-02-19 VITALS
OXYGEN SATURATION: 94 % | HEART RATE: 90 BPM | WEIGHT: 154 LBS | TEMPERATURE: 97 F | RESPIRATION RATE: 16 BRPM | BODY MASS INDEX: 23.34 KG/M2 | SYSTOLIC BLOOD PRESSURE: 120 MMHG | HEIGHT: 68 IN | DIASTOLIC BLOOD PRESSURE: 72 MMHG

## 2021-02-19 DIAGNOSIS — I10 ESSENTIAL HYPERTENSION: ICD-10-CM

## 2021-02-19 DIAGNOSIS — I77.79 DISSECTION OF MESENTERIC ARTERY (HCC): ICD-10-CM

## 2021-02-19 DIAGNOSIS — D44.6 CAROTID BODY TUMOR (HCC): ICD-10-CM

## 2021-02-19 DIAGNOSIS — E03.9 ACQUIRED HYPOTHYROIDISM: Primary | ICD-10-CM

## 2021-02-19 DIAGNOSIS — R73.09 ELEVATED HEMOGLOBIN A1C: ICD-10-CM

## 2021-02-19 DIAGNOSIS — Z51.81 ENCOUNTER FOR MEDICATION MONITORING: ICD-10-CM

## 2021-02-19 PROCEDURE — 99213 OFFICE O/P EST LOW 20 MIN: CPT | Performed by: FAMILY MEDICINE

## 2021-02-19 PROCEDURE — 3008F BODY MASS INDEX DOCD: CPT | Performed by: FAMILY MEDICINE

## 2021-02-19 PROCEDURE — 3078F DIAST BP <80 MM HG: CPT | Performed by: FAMILY MEDICINE

## 2021-02-19 PROCEDURE — 3074F SYST BP LT 130 MM HG: CPT | Performed by: FAMILY MEDICINE

## 2021-02-24 ENCOUNTER — HOSPITAL ENCOUNTER (OUTPATIENT)
Dept: CT IMAGING | Age: 76
Discharge: HOME | End: 2021-02-24
Attending: SURGERY
Payer: MEDICARE

## 2021-02-24 DIAGNOSIS — D44.6 CAROTID BODY TUMOR (HCC): ICD-10-CM

## 2021-02-24 LAB — CREAT BLD-MCNC: 0.8 MG/DL

## 2021-02-24 PROCEDURE — 70496 CT ANGIOGRAPHY HEAD: CPT | Performed by: SURGERY

## 2021-02-24 PROCEDURE — 82565 ASSAY OF CREATININE: CPT

## 2021-02-24 PROCEDURE — 70498 CT ANGIOGRAPHY NECK: CPT | Performed by: SURGERY

## 2021-03-04 ENCOUNTER — IMMUNIZATION (OUTPATIENT)
Dept: LAB | Age: 76
End: 2021-03-04
Attending: HOSPITALIST
Payer: MEDICARE

## 2021-03-04 DIAGNOSIS — Z23 NEED FOR VACCINATION: Primary | ICD-10-CM

## 2021-03-04 PROCEDURE — 0002A SARSCOV2 VAC 30MCG/0.3ML IM: CPT

## 2021-03-08 NOTE — PROGRESS NOTES
[facility] Progress Note      Patient Name: Alicia Osborne   YOB: 1945  Medical Record Number: [id number]  Attending Physician: Ellen Osei. Wilma Galicia M.D.        Date of Visit: 3/9/2021      Chief Complaint  Thrombocytosis, (historical data, reviewed by physician)  Tonsillectomy; vasectomy; robotic assisted laparoscopic prostatectomy and pelvic lymphadenectomy; inguinal hernia repair.      Family History (historical data, reviewed by physician)  Sister with breast cancer; sist appropriate. Laboratory   Recent Results (from the past 3360 hour(s))   RAPID SARS-COV-2 BY PCR    Collection Time: 11/07/20  2:47 PM    Specimen: Nares;  Other   Result Value Ref Range    Rapid SARS-CoV-2 by PCR Detected (A) Not Detected   COMP METABOLI Procalcitonin <0.05 <=0.16 ng/mL   CK CREATINE KINASE (NOT CREATININE)    Collection Time: 11/07/20  3:40 PM   Result Value Ref Range    CK 55 39 - 308 U/L   CBC W/ DIFFERENTIAL    Collection Time: 11/07/20  3:40 PM   Result Value Ref Range    WBC 3.1 (L) 32.7 31.0 - 37.0 g/dL    RDW 11.9 11.0 - 15.0 %    RDW-SD 45.1 35.1 - 46.3 fL    Neutrophil Absolute Prelim 0.97 (L) 1.50 - 7.70 x10 (3) uL    Neutrophil Absolute 0.97 (L) 1.50 - 7.70 x10(3) uL    Lymphocyte Absolute 0.78 (L) 1.00 - 4.00 x10(3) uL    Monoc 7.5 6.4 - 8.2 g/dL    Albumin 3.5 3.4 - 5.0 g/dL    Globulin  4.0 2.8 - 4.4 g/dL    A/G Ratio 0.9 (L) 1.0 - 2.0   CBC W/ DIFFERENTIAL    Collection Time: 11/09/20  6:05 AM   Result Value Ref Range    WBC 1.5 (L) 4.0 - 11.0 x10(3) uL    RBC 4.94 3.80 - 5.80 GFR, -American 104 >=60    AST 11 (L) 15 - 37 U/L    ALT 20 16 - 61 U/L    Alkaline Phosphatase 63 45 - 117 U/L    Bilirubin, Total 0.3 0.1 - 2.0 mg/dL    Total Protein 6.4 6.4 - 8.2 g/dL    Albumin 3.0 (L) 3.4 - 5.0 g/dL    Globulin  3.4 2.8 - 4 Ratio 0.9 (L) 1.0 - 2.0   EKG 12-LEAD    Collection Time: 11/16/20 10:56 AM   Result Value Ref Range    Ventricular rate 67 BPM    Atrial rate 67 BPM    P-R Interval 134 ms    QRS Duration 100 ms    Q-T Interval 376 ms    QTC Calculation (Bezet) 397 ms W/ DIFFERENTIAL    Collection Time: 11/16/20 11:07 AM   Result Value Ref Range    WBC 8.6 4.0 - 11.0 x10(3) uL    RBC 4.54 3.80 - 5.80 x10(6)uL    HGB 15.1 13.0 - 17.5 g/dL    HCT 45.1 39.0 - 53.0 %    .0 150.0 - 450.0 10(3)uL    MCV 99.3 80.0 - 100 Collection Time: 12/12/20 10:09 AM   Result Value Ref Range    ISTAT Creatinine 0.80 0.70 - 1.30 mg/dL    GFR, African-American 103 >=60    GFR, Non- 89 >=97   COMP METABOLIC PANEL (14)    Collection Time: 12/20/20 10:14 AM   Result Value R Result Value Ref Range    Urine Color Red (A) Yellow    Clarity Urine Turbid (A) Clear    Spec Gravity 1.025 1.001 - 1.030    Glucose Urine Negative Negative mg/dl    Bilirubin Urine Negative Negative    Ketones Urine Negative Negative mg/dL    Blood [place] Neutrophil Absolute Prelim 5.32 1.50 - 7.70 x10 (3) uL    Neutrophil Absolute 5.32 1.50 - 7.70 x10(3) uL    Lymphocyte Absolute 1.08 1.00 - 4.00 x10(3) uL    Monocyte Absolute 1.37 (H) 0.10 - 1.00 x10(3) uL    Eosinophil Absolute 0.06 0.00 - 0.70 x10(3) uL 4. 23 1.50 - 7.70 x10 (3) uL    Neutrophil Absolute 4.23 1.50 - 7.70 x10(3) uL    Lymphocyte Absolute 1.10 1.00 - 4.00 x10(3) uL    Monocyte Absolute 1.16 (H) 0.10 - 1.00 x10(3) uL    Eosinophil Absolute 0.03 0.00 - 0.70 x10(3) uL    Basophil Absolute 0.01 present. Leanora Rash fibrotic changes are present.  A background of mild emphysematous changes are is also present   which is upper lobe predominant.  Some subtle areas of ground-glass opacity are present within the lungs.  A representative areas present within abdomen/pelvis   PROCEDURE:  CTA ABD/PEL (PCT=70249)   COMPARISON:  [facility], CT, CT ANGIOGRAPHY, CHEST (CPT=71275), 11/16/2020, 12:19 PM.       INDICATIONS:  I77.79 Dissection of mesenteric artery (HCC)       TECHNIQUE:  CT images of the abdomen and pel adenopathy. BOWEL/MESENTERY:  Normal caliber appendix. Uncomplicated colonic diverticulosis.  Normal caliber small bowel loops.  No evidence of wall thickening.    ABDOMINAL WALL:  Small fat containing umbilical hernia.  Bilateral fat containing small ing resolution. 3.   Non-invasive low grade papillary urothelial carcinoma: Patient will continue to follow with urology for serial cystoscopy. Planned Follow Up   Patient will return for follow up in 6 months and PRN. Electronically signed by:

## 2021-03-09 ENCOUNTER — OFFICE VISIT (OUTPATIENT)
Dept: HEMATOLOGY/ONCOLOGY | Facility: HOSPITAL | Age: 76
End: 2021-03-09
Attending: SPECIALIST
Payer: MEDICARE

## 2021-03-09 ENCOUNTER — TELEPHONE (OUTPATIENT)
Dept: HEMATOLOGY/ONCOLOGY | Facility: HOSPITAL | Age: 76
End: 2021-03-09

## 2021-03-09 VITALS
WEIGHT: 152 LBS | RESPIRATION RATE: 18 BRPM | TEMPERATURE: 97 F | HEART RATE: 85 BPM | DIASTOLIC BLOOD PRESSURE: 84 MMHG | BODY MASS INDEX: 24.43 KG/M2 | HEIGHT: 65.98 IN | SYSTOLIC BLOOD PRESSURE: 148 MMHG | OXYGEN SATURATION: 92 %

## 2021-03-09 DIAGNOSIS — C67.9 MALIGNANT NEOPLASM OF URINARY BLADDER, UNSPECIFIED SITE (HCC): ICD-10-CM

## 2021-03-09 DIAGNOSIS — D72.821 MONOCYTOSIS: ICD-10-CM

## 2021-03-09 DIAGNOSIS — C61 PROSTATE CANCER (HCC): Primary | ICD-10-CM

## 2021-03-09 LAB — PSA SERPL-MCNC: 0.02 NG/ML (ref ?–4)

## 2021-03-09 PROCEDURE — 99214 OFFICE O/P EST MOD 30 MIN: CPT | Performed by: SPECIALIST

## 2021-03-09 NOTE — TELEPHONE ENCOUNTER
MD Tye Almanzar, RN  Call his cell phone. He probably won't answer but OK to leave message. Tell him his PSA is exactly the same as it was before at 0.02. We'll see him in 6 months. Patient notified.

## 2021-03-30 RX ORDER — LISINOPRIL 5 MG/1
TABLET ORAL
Qty: 90 TABLET | Refills: 0 | Status: SHIPPED | OUTPATIENT
Start: 2021-03-30 | End: 2021-06-15

## 2021-04-14 RX ORDER — LEVOTHYROXINE SODIUM 0.12 MG/1
TABLET ORAL
Qty: 90 TABLET | Refills: 0 | Status: SHIPPED | OUTPATIENT
Start: 2021-04-14 | End: 2021-07-19

## 2021-04-14 NOTE — TELEPHONE ENCOUNTER
Thyroid Supplements Protocol Xowiox1204/14/2021 10:04 AM   TSH test in past 12 months Protocol Details    TSH value between 0.350 and 5.500 IU/ml     Appointment in past 12 or next 3 months      LOV 2/19/21     LAST LAB   2/17/21     LAST RX  1/11/21 90

## 2021-04-20 ENCOUNTER — TELEPHONE (OUTPATIENT)
Dept: FAMILY MEDICINE CLINIC | Facility: CLINIC | Age: 76
End: 2021-04-20

## 2021-05-17 ENCOUNTER — TELEPHONE (OUTPATIENT)
Dept: FAMILY MEDICINE CLINIC | Facility: CLINIC | Age: 76
End: 2021-05-17

## 2021-05-17 NOTE — TELEPHONE ENCOUNTER
Reached patient for medication adherence consult. Patient is past due for refill on lisinopril, last filled 2/28/21 #30. Patient states he had extra supply at one point and is not out of medication. He denies forgetting or missing doses.  Did provide ed

## 2021-05-26 VITALS
WEIGHT: 160 LBS | RESPIRATION RATE: 14 BRPM | BODY MASS INDEX: 24.25 KG/M2 | HEART RATE: 82 BPM | TEMPERATURE: 99.2 F | HEIGHT: 68 IN | SYSTOLIC BLOOD PRESSURE: 124 MMHG | DIASTOLIC BLOOD PRESSURE: 74 MMHG | OXYGEN SATURATION: 96 %

## 2021-06-09 ENCOUNTER — OFFICE VISIT (OUTPATIENT)
Dept: FAMILY MEDICINE CLINIC | Facility: CLINIC | Age: 76
End: 2021-06-09
Payer: COMMERCIAL

## 2021-06-09 VITALS
WEIGHT: 151 LBS | RESPIRATION RATE: 16 BRPM | SYSTOLIC BLOOD PRESSURE: 122 MMHG | HEIGHT: 68 IN | DIASTOLIC BLOOD PRESSURE: 78 MMHG | HEART RATE: 87 BPM | BODY MASS INDEX: 22.88 KG/M2 | TEMPERATURE: 97 F | OXYGEN SATURATION: 95 %

## 2021-06-09 DIAGNOSIS — L72.3 SEBACEOUS CYST: ICD-10-CM

## 2021-06-09 DIAGNOSIS — D44.6 CAROTID BODY TUMOR (HCC): ICD-10-CM

## 2021-06-09 DIAGNOSIS — E03.9 ACQUIRED HYPOTHYROIDISM: ICD-10-CM

## 2021-06-09 DIAGNOSIS — R09.82 POST-NASAL DRIP: Primary | ICD-10-CM

## 2021-06-09 DIAGNOSIS — R09.89 CHOKING EPISODE: ICD-10-CM

## 2021-06-09 DIAGNOSIS — I10 ESSENTIAL HYPERTENSION: ICD-10-CM

## 2021-06-09 PROCEDURE — 3074F SYST BP LT 130 MM HG: CPT | Performed by: FAMILY MEDICINE

## 2021-06-09 PROCEDURE — 99213 OFFICE O/P EST LOW 20 MIN: CPT | Performed by: FAMILY MEDICINE

## 2021-06-09 PROCEDURE — 3008F BODY MASS INDEX DOCD: CPT | Performed by: FAMILY MEDICINE

## 2021-06-09 PROCEDURE — 3078F DIAST BP <80 MM HG: CPT | Performed by: FAMILY MEDICINE

## 2021-06-09 RX ORDER — FLUTICASONE PROPIONATE 50 MCG
SPRAY, SUSPENSION (ML) NASAL
Qty: 16 G | Refills: 1 | Status: SHIPPED | OUTPATIENT
Start: 2021-06-09 | End: 2022-01-17

## 2021-06-09 RX ORDER — MONTELUKAST SODIUM 10 MG/1
10 TABLET ORAL NIGHTLY
Qty: 30 TABLET | Refills: 2 | Status: SHIPPED | OUTPATIENT
Start: 2021-06-09 | End: 2021-09-24

## 2021-06-09 NOTE — PROGRESS NOTES
Nova Henry is a 70year old male. HPI:  States wife concerned about some \"choking\" episodes pt has had over the last few months  Last week occurred while was driving and had to pull over.   Was eating grapes and then felt like some juice from fruit IRIDOTOMY - OU - BOTH EYES  2019         Social History    Tobacco Use      Smoking status: Former Smoker        Packs/day: 0.50        Years: 50.00        Pack years: 22        Quit date: 5/15/2015        Years since quittin.1      Smokeless tobacc allergies/postnasal drip. May be having some choking episodes due to phlegm in throat. Continue Claritin daily. Start Singular 10 mg at bedtime. Flonase 2 sprays each nostril daily. Also discussed GERD in differential diagnosis.   May need to consider

## 2021-06-15 ENCOUNTER — LAB ENCOUNTER (OUTPATIENT)
Dept: LAB | Age: 76
End: 2021-06-15
Attending: SURGERY
Payer: MEDICARE

## 2021-06-15 ENCOUNTER — OFFICE VISIT (OUTPATIENT)
Dept: FAMILY MEDICINE CLINIC | Facility: CLINIC | Age: 76
End: 2021-06-15
Payer: COMMERCIAL

## 2021-06-15 VITALS
BODY MASS INDEX: 24.11 KG/M2 | OXYGEN SATURATION: 93 % | DIASTOLIC BLOOD PRESSURE: 74 MMHG | TEMPERATURE: 98 F | HEIGHT: 66 IN | HEART RATE: 72 BPM | WEIGHT: 150 LBS | SYSTOLIC BLOOD PRESSURE: 110 MMHG | RESPIRATION RATE: 16 BRPM

## 2021-06-15 DIAGNOSIS — H40.033 NARROW ANGLE OF ANTERIOR CHAMBER OF BOTH EYES: ICD-10-CM

## 2021-06-15 DIAGNOSIS — H26.8 OTHER CATARACT OF BOTH EYES: ICD-10-CM

## 2021-06-15 DIAGNOSIS — I70.0 ATHEROSCLEROSIS OF AORTA (HCC): ICD-10-CM

## 2021-06-15 DIAGNOSIS — I10 ESSENTIAL HYPERTENSION: ICD-10-CM

## 2021-06-15 DIAGNOSIS — R09.82 POST-NASAL DRIP: ICD-10-CM

## 2021-06-15 DIAGNOSIS — J43.2 CENTRILOBULAR EMPHYSEMA (HCC): ICD-10-CM

## 2021-06-15 DIAGNOSIS — E03.9 ACQUIRED HYPOTHYROIDISM: ICD-10-CM

## 2021-06-15 DIAGNOSIS — C67.9 UROTHELIAL CARCINOMA OF BLADDER (HCC): ICD-10-CM

## 2021-06-15 DIAGNOSIS — D44.6 CAROTID BODY TUMOR (HCC): ICD-10-CM

## 2021-06-15 DIAGNOSIS — I77.79 DISSECTION OF MESENTERIC ARTERY (HCC): ICD-10-CM

## 2021-06-15 DIAGNOSIS — Z85.46 HISTORY OF PROSTATE CANCER: ICD-10-CM

## 2021-06-15 DIAGNOSIS — E78.49 OTHER HYPERLIPIDEMIA: ICD-10-CM

## 2021-06-15 DIAGNOSIS — Z00.00 ENCOUNTER FOR ANNUAL HEALTH EXAMINATION: Primary | ICD-10-CM

## 2021-06-15 DIAGNOSIS — Z01.818 PREOP TESTING: ICD-10-CM

## 2021-06-15 DIAGNOSIS — J30.89 ALLERGIC RHINITIS DUE TO OTHER ALLERGIC TRIGGER, UNSPECIFIED SEASONALITY: ICD-10-CM

## 2021-06-15 DIAGNOSIS — R73.09 ELEVATED HEMOGLOBIN A1C: ICD-10-CM

## 2021-06-15 PROCEDURE — 3078F DIAST BP <80 MM HG: CPT | Performed by: FAMILY MEDICINE

## 2021-06-15 PROCEDURE — 3074F SYST BP LT 130 MM HG: CPT | Performed by: FAMILY MEDICINE

## 2021-06-15 PROCEDURE — 36415 COLL VENOUS BLD VENIPUNCTURE: CPT

## 2021-06-15 PROCEDURE — 3008F BODY MASS INDEX DOCD: CPT | Performed by: FAMILY MEDICINE

## 2021-06-15 PROCEDURE — 99397 PER PM REEVAL EST PAT 65+ YR: CPT | Performed by: FAMILY MEDICINE

## 2021-06-15 PROCEDURE — 85610 PROTHROMBIN TIME: CPT

## 2021-06-15 PROCEDURE — 85730 THROMBOPLASTIN TIME PARTIAL: CPT

## 2021-06-15 PROCEDURE — 96160 PT-FOCUSED HLTH RISK ASSMT: CPT | Performed by: FAMILY MEDICINE

## 2021-06-15 PROCEDURE — G0439 PPPS, SUBSEQ VISIT: HCPCS | Performed by: FAMILY MEDICINE

## 2021-06-15 RX ORDER — LISINOPRIL 5 MG/1
5 TABLET ORAL DAILY
Qty: 90 TABLET | Refills: 1 | Status: SHIPPED | OUTPATIENT
Start: 2021-06-15 | End: 2021-12-27

## 2021-06-15 NOTE — PATIENT INSTRUCTIONS
Samuel Romero's SCREENING SCHEDULE   Tests on this list are recommended by your physician but may not be covered, or covered at this frequency, by your insurer. Please check with your insurance carrier before scheduling to verify coverage.    PREVENT Each vaccine ([id number] & [id number]) covered once after 65 Prevnar 13: 05/17/2016    [id number]: 05/26/2017     No recommendations at this time    Hepatitis B One screening covered for patients with certain risk factors   -  No recommendations at this t

## 2021-06-15 NOTE — PROGRESS NOTES
HPI:   Deirdre Leon is a 70year old male who presents for a MA (Medicare Advantage) [address] [place] [address] (Once per calendar year). Scheduled for surgery with vascular on 6/21/2021 for carotid body tumor. No problems with anesthesia with prev surgeries. explanation and discussion of advance directives standard forms performed Face to Face with patient and Family/surrogate (if present), and forms available to patient in AVS       He smoked tobacco in the past but quit greater than 12 months ago.   Social Hi 02/17/2021    CREATSERUM 0.78 06/10/2021    GLU 99 06/10/2021        CBC  (most recent labs)   Lab Results   Component Value Date    WBC 5.7 06/21/2021    HGB 15.0 06/21/2021    .0 06/21/2021        ALLERGIES:   He has No Known Allergies.     CURRENT EYES: denies blurred vision or double vision  HEENT: denies nasal congestion, sinus pain or ST  decreased postnasal drip as above  LUNGS: denies shortness of breath with exertion  CARDIOVASCULAR: denies chest pain on exertion  GI: denies abdominal pain, carotid bruits, no JVD  Right lateral neck with 8 mm mobile sebaceous cyst.  Healed scar left upper neck   Back:   Symmetric, ROM normal, NT   Lungs:   Clear to auscultation bilaterally, respirations unlabored   Chest Wall:  No tenderness or deformity   He colonoscopy    Acquired hypothyroidism  TSH normal. Continue current levothyroxine dose    Essential hypertension  Stable. Electrolytes normal. CPM.    Centrilobular emphysema (HCC)  Clinically no symptoms. Ex-smoker. Monitor.     Carotid body tumor ([facility] [address] No  How does the patient maintain a good energy level?: Daily Walks  How would you describe your daily physical activity?: Moderate  How would you describe your current health state?: Good  How do you maintain positive mental well-being?: Visiting Friends -   Prostate Cancer Screening    Prostate-Specific Antigen (PSA) Annually Lab Results   Component Value Date    PSA 0.02 03/09/2021     PSA due on 03/09/2023   Immunizations    Influenza Covered once per flu season  Please get every year -  No recommendati

## 2021-06-18 ENCOUNTER — EKG ENCOUNTER (OUTPATIENT)
Dept: LAB | Age: 76
DRG: 983 | End: 2021-06-18
Attending: SURGERY
Payer: MEDICARE

## 2021-06-18 ENCOUNTER — LAB ENCOUNTER (OUTPATIENT)
Dept: LAB | Age: 76
DRG: 983 | End: 2021-06-18
Attending: SURGERY
Payer: MEDICARE

## 2021-06-18 DIAGNOSIS — D44.6 CAROTID BODY TUMOR (HCC): ICD-10-CM

## 2021-06-18 DIAGNOSIS — Z01.818 PREOP TESTING: ICD-10-CM

## 2021-06-18 DIAGNOSIS — Z91.89 AT HIGH RISK FOR BLEEDING: ICD-10-CM

## 2021-06-18 PROCEDURE — 93010 ELECTROCARDIOGRAM REPORT: CPT | Performed by: INTERNAL MEDICINE

## 2021-06-18 PROCEDURE — 93005 ELECTROCARDIOGRAM TRACING: CPT

## 2021-06-18 PROCEDURE — 86901 BLOOD TYPING SEROLOGIC RH(D): CPT

## 2021-06-18 PROCEDURE — 86900 BLOOD TYPING SEROLOGIC ABO: CPT

## 2021-06-18 PROCEDURE — 86850 RBC ANTIBODY SCREEN: CPT

## 2021-06-21 ENCOUNTER — ANESTHESIA EVENT (OUTPATIENT)
Dept: CARDIAC SURGERY | Facility: HOSPITAL | Age: 76
DRG: 983 | End: 2021-06-21
Payer: MEDICARE

## 2021-06-21 ENCOUNTER — HOSPITAL ENCOUNTER (INPATIENT)
Facility: HOSPITAL | Age: 76
LOS: 1 days | Discharge: HOME | DRG: 983 | End: 2021-06-22
Attending: SURGERY | Admitting: SURGERY
Payer: MEDICARE

## 2021-06-21 ENCOUNTER — ANESTHESIA (OUTPATIENT)
Dept: CARDIAC SURGERY | Facility: HOSPITAL | Age: 76
DRG: 983 | End: 2021-06-21
Payer: MEDICARE

## 2021-06-21 DIAGNOSIS — Z01.818 PREOP TESTING: ICD-10-CM

## 2021-06-21 DIAGNOSIS — Z91.89 AT HIGH RISK FOR BLEEDING: ICD-10-CM

## 2021-06-21 DIAGNOSIS — D44.6 CAROTID BODY TUMOR (HCC): Primary | ICD-10-CM

## 2021-06-21 PROCEDURE — 82330 ASSAY OF CALCIUM: CPT

## 2021-06-21 PROCEDURE — 82803 BLOOD GASES ANY COMBINATION: CPT

## 2021-06-21 PROCEDURE — 85730 THROMBOPLASTIN TIME PARTIAL: CPT | Performed by: SURGERY

## 2021-06-21 PROCEDURE — 86920 COMPATIBILITY TEST SPIN: CPT

## 2021-06-21 PROCEDURE — 88342 IMHCHEM/IMCYTCHM 1ST ANTB: CPT | Performed by: SURGERY

## 2021-06-21 PROCEDURE — 85027 COMPLETE CBC AUTOMATED: CPT | Performed by: SURGERY

## 2021-06-21 PROCEDURE — 84132 ASSAY OF SERUM POTASSIUM: CPT

## 2021-06-21 PROCEDURE — 88341 IMHCHEM/IMCYTCHM EA ADD ANTB: CPT | Performed by: SURGERY

## 2021-06-21 PROCEDURE — 85347 COAGULATION TIME ACTIVATED: CPT

## 2021-06-21 PROCEDURE — 85014 HEMATOCRIT: CPT

## 2021-06-21 PROCEDURE — 85610 PROTHROMBIN TIME: CPT | Performed by: SURGERY

## 2021-06-21 PROCEDURE — 88304 TISSUE EXAM BY PATHOLOGIST: CPT | Performed by: SURGERY

## 2021-06-21 PROCEDURE — 84295 ASSAY OF SERUM SODIUM: CPT

## 2021-06-21 PROCEDURE — 0GB60ZZ EXCISION OF LEFT CAROTID BODY, OPEN APPROACH: ICD-10-PCS | Performed by: SURGERY

## 2021-06-21 RX ORDER — LISINOPRIL 5 MG/1
5 TABLET ORAL DAILY
Status: DISCONTINUED | OUTPATIENT
Start: 2021-06-21 | End: 2021-06-22

## 2021-06-21 RX ORDER — HYDROMORPHONE HYDROCHLORIDE 1 MG/ML
0.4 INJECTION, SOLUTION INTRAMUSCULAR; INTRAVENOUS; SUBCUTANEOUS EVERY 5 MIN PRN
Status: DISCONTINUED | OUTPATIENT
Start: 2021-06-21 | End: 2021-06-21 | Stop reason: HOSPADM

## 2021-06-21 RX ORDER — LABETALOL HYDROCHLORIDE 5 MG/ML
5 INJECTION, SOLUTION INTRAVENOUS EVERY 5 MIN PRN
Status: DISCONTINUED | OUTPATIENT
Start: 2021-06-21 | End: 2021-06-21 | Stop reason: HOSPADM

## 2021-06-21 RX ORDER — ONDANSETRON 2 MG/ML
4 INJECTION INTRAMUSCULAR; INTRAVENOUS EVERY 6 HOURS PRN
Status: DISCONTINUED | OUTPATIENT
Start: 2021-06-21 | End: 2021-06-22

## 2021-06-21 RX ORDER — CEFAZOLIN SODIUM/WATER 2 G/20 ML
2 SYRINGE (ML) INTRAVENOUS EVERY 8 HOURS
Status: COMPLETED | OUTPATIENT
Start: 2021-06-21 | End: 2021-06-22

## 2021-06-21 RX ORDER — SODIUM CHLORIDE 9 MG/ML
INJECTION, SOLUTION INTRAVENOUS CONTINUOUS
Status: DISCONTINUED | OUTPATIENT
Start: 2021-06-21 | End: 2021-06-21 | Stop reason: HOSPADM

## 2021-06-21 RX ORDER — NALOXONE HYDROCHLORIDE 0.4 MG/ML
80 INJECTION, SOLUTION INTRAMUSCULAR; INTRAVENOUS; SUBCUTANEOUS AS NEEDED
Status: DISCONTINUED | OUTPATIENT
Start: 2021-06-21 | End: 2021-06-21 | Stop reason: HOSPADM

## 2021-06-21 RX ORDER — HYDROCODONE BITARTRATE AND ACETAMINOPHEN 5; 325 MG/1; MG/1
2 TABLET ORAL EVERY 4 HOURS PRN
Status: DISCONTINUED | OUTPATIENT
Start: 2021-06-21 | End: 2021-06-22

## 2021-06-21 RX ORDER — LEVOTHYROXINE SODIUM 0.12 MG/1
125 TABLET ORAL EVERY MORNING
Status: DISCONTINUED | OUTPATIENT
Start: 2021-06-22 | End: 2021-06-22

## 2021-06-21 RX ORDER — ROCURONIUM BROMIDE 10 MG/ML
INJECTION, SOLUTION INTRAVENOUS AS NEEDED
Status: DISCONTINUED | OUTPATIENT
Start: 2021-06-21 | End: 2021-06-21 | Stop reason: SURG

## 2021-06-21 RX ORDER — METOCLOPRAMIDE HYDROCHLORIDE 5 MG/ML
10 INJECTION INTRAMUSCULAR; INTRAVENOUS AS NEEDED
Status: DISCONTINUED | OUTPATIENT
Start: 2021-06-21 | End: 2021-06-21 | Stop reason: HOSPADM

## 2021-06-21 RX ORDER — SODIUM CHLORIDE 9 MG/ML
INJECTION, SOLUTION INTRAVENOUS CONTINUOUS PRN
Status: DISCONTINUED | OUTPATIENT
Start: 2021-06-21 | End: 2021-06-21 | Stop reason: SURG

## 2021-06-21 RX ORDER — HYDROMORPHONE HYDROCHLORIDE 1 MG/ML
0.2 INJECTION, SOLUTION INTRAMUSCULAR; INTRAVENOUS; SUBCUTANEOUS EVERY 2 HOUR PRN
Status: DISCONTINUED | OUTPATIENT
Start: 2021-06-21 | End: 2021-06-22

## 2021-06-21 RX ORDER — HYDROCODONE BITARTRATE AND ACETAMINOPHEN 5; 325 MG/1; MG/1
2 TABLET ORAL AS NEEDED
Status: DISCONTINUED | OUTPATIENT
Start: 2021-06-21 | End: 2021-06-21 | Stop reason: HOSPADM

## 2021-06-21 RX ORDER — ONDANSETRON 2 MG/ML
4 INJECTION INTRAMUSCULAR; INTRAVENOUS AS NEEDED
Status: DISCONTINUED | OUTPATIENT
Start: 2021-06-21 | End: 2021-06-21 | Stop reason: HOSPADM

## 2021-06-21 RX ORDER — ONDANSETRON 2 MG/ML
INJECTION INTRAMUSCULAR; INTRAVENOUS AS NEEDED
Status: DISCONTINUED | OUTPATIENT
Start: 2021-06-21 | End: 2021-06-21 | Stop reason: SURG

## 2021-06-21 RX ORDER — MELATONIN
3 NIGHTLY
Status: DISCONTINUED | OUTPATIENT
Start: 2021-06-21 | End: 2021-06-22

## 2021-06-21 RX ORDER — ACETAMINOPHEN 325 MG/1
650 TABLET ORAL EVERY 4 HOURS PRN
Status: DISCONTINUED | OUTPATIENT
Start: 2021-06-21 | End: 2021-06-22

## 2021-06-21 RX ORDER — SODIUM CHLORIDE, SODIUM LACTATE, POTASSIUM CHLORIDE, CALCIUM CHLORIDE 600; 310; 30; 20 MG/100ML; MG/100ML; MG/100ML; MG/100ML
INJECTION, SOLUTION INTRAVENOUS CONTINUOUS
Status: DISCONTINUED | OUTPATIENT
Start: 2021-06-21 | End: 2021-06-22

## 2021-06-21 RX ORDER — BUPIVACAINE HYDROCHLORIDE 5 MG/ML
INJECTION, SOLUTION EPIDURAL; INTRACAUDAL AS NEEDED
Status: DISCONTINUED | OUTPATIENT
Start: 2021-06-21 | End: 2021-06-21 | Stop reason: HOSPADM

## 2021-06-21 RX ORDER — LIDOCAINE HYDROCHLORIDE 10 MG/ML
INJECTION, SOLUTION EPIDURAL; INFILTRATION; INTRACAUDAL; PERINEURAL AS NEEDED
Status: DISCONTINUED | OUTPATIENT
Start: 2021-06-21 | End: 2021-06-21 | Stop reason: SURG

## 2021-06-21 RX ORDER — ALBUTEROL SULFATE 2.5 MG/3ML
2.5 SOLUTION RESPIRATORY (INHALATION) AS NEEDED
Status: DISCONTINUED | OUTPATIENT
Start: 2021-06-21 | End: 2021-06-21 | Stop reason: HOSPADM

## 2021-06-21 RX ORDER — HYDROCODONE BITARTRATE AND ACETAMINOPHEN 5; 325 MG/1; MG/1
1 TABLET ORAL EVERY 4 HOURS PRN
Status: DISCONTINUED | OUTPATIENT
Start: 2021-06-21 | End: 2021-06-22

## 2021-06-21 RX ORDER — ACETAMINOPHEN 500 MG
500 TABLET ORAL ONCE AS NEEDED
Status: DISCONTINUED | OUTPATIENT
Start: 2021-06-21 | End: 2021-06-21 | Stop reason: HOSPADM

## 2021-06-21 RX ORDER — CEFAZOLIN SODIUM/WATER 2 G/20 ML
SYRINGE (ML) INTRAVENOUS AS NEEDED
Status: DISCONTINUED | OUTPATIENT
Start: 2021-06-21 | End: 2021-06-21 | Stop reason: SURG

## 2021-06-21 RX ORDER — MONTELUKAST SODIUM 10 MG/1
10 TABLET ORAL NIGHTLY
Status: DISCONTINUED | OUTPATIENT
Start: 2021-06-21 | End: 2021-06-22

## 2021-06-21 RX ORDER — HYDROCODONE BITARTRATE AND ACETAMINOPHEN 5; 325 MG/1; MG/1
1 TABLET ORAL AS NEEDED
Status: DISCONTINUED | OUTPATIENT
Start: 2021-06-21 | End: 2021-06-21 | Stop reason: HOSPADM

## 2021-06-21 RX ORDER — HEPARIN SODIUM 5000 [USP'U]/ML
5000 INJECTION, SOLUTION INTRAVENOUS; SUBCUTANEOUS EVERY 8 HOURS SCHEDULED
Status: DISCONTINUED | OUTPATIENT
Start: 2021-06-21 | End: 2021-06-22

## 2021-06-21 RX ORDER — DEXAMETHASONE SODIUM PHOSPHATE 4 MG/ML
VIAL (ML) INJECTION AS NEEDED
Status: DISCONTINUED | OUTPATIENT
Start: 2021-06-21 | End: 2021-06-21 | Stop reason: SURG

## 2021-06-21 RX ADMIN — ONDANSETRON 4 MG: 2 INJECTION INTRAMUSCULAR; INTRAVENOUS at 13:21:00

## 2021-06-21 RX ADMIN — ROCURONIUM BROMIDE 50 MG: 10 INJECTION, SOLUTION INTRAVENOUS at 12:05:00

## 2021-06-21 RX ADMIN — LIDOCAINE HYDROCHLORIDE 50 MG: 10 INJECTION, SOLUTION EPIDURAL; INFILTRATION; INTRACAUDAL; PERINEURAL at 12:05:00

## 2021-06-21 RX ADMIN — SODIUM CHLORIDE: 9 INJECTION, SOLUTION INTRAVENOUS at 12:00:00

## 2021-06-21 RX ADMIN — CEFAZOLIN SODIUM/WATER 2 G: 2 G/20 ML SYRINGE (ML) INTRAVENOUS at 12:25:00

## 2021-06-21 RX ADMIN — DEXAMETHASONE SODIUM PHOSPHATE 8 MG: 4 MG/ML VIAL (ML) INJECTION at 12:34:00

## 2021-06-21 NOTE — ANESTHESIA POSTPROCEDURE EVALUATION
[address] [place] [address] Patient Status:  Inpatient   Age/Gender 70year old male MRN [id number]   Location [facility] Attending Jocelynn Wolf MD   Hosp Day # 0 PCP Adriana Mason MD       Anesthesia Post-op No

## 2021-06-21 NOTE — ANESTHESIA PROCEDURE NOTES
Airway  Urgency: elective      General Information and Staff    Patient location during procedure: OR  Anesthesiologist: Tiffany Penn MD  Performed: anesthesiologist     Indications and Patient Condition  Indications for airway management: anesthes

## 2021-06-21 NOTE — ANESTHESIA PROCEDURE NOTES
Peripheral IV  Inserted by: Suresh Pearce MD    Placement  Needle size: 18 G  Laterality: left  Location: forearm  Site prep: alcohol  Technique: anatomical landmarks  Attempts: 1

## 2021-06-21 NOTE — ANESTHESIA PREPROCEDURE EVALUATION
PRE-OP EVALUATION    Patient Name: Coty Romero    Admit Diagnosis: carotid body tumor  Carotid body tumor ([facility] [address]    Pre-op Diagnosis: carotid body tumor    surgical resection of left carotid body tumor    Anesthesia Procedure: surgical resection of lef MORNING AS DIRECTED, Disp: 90 tablet, Rfl: 0, 6/20/2021 at am  loratadine 10 MG Oral Tab, Take 10 mg by mouth daily as needed. , Disp: , Rfl:   Cholecalciferol (VITAMIN D3) 1000 UNITS Oral Cap, Take 1 tablet by mouth daily. , Disp: , Rfl: , 6/20/2021 at am  06/10/2021     Lab Results   Component Value Date     06/10/2021    K 5.0 06/10/2021     06/10/2021    CO2 25 06/10/2021    BUN 18 06/10/2021    CREATSERUM 0.78 06/10/2021    GLU 99 06/10/2021    CA 9.3 06/10/2021     Lab Results   C

## 2021-06-21 NOTE — PLAN OF CARE
Received pt at 1500. Pt alert and oriented x4, neurologically intact. L neck incision is C/D/I with skin glue in place and no drainage. Pt on 2L NC, lungs clear/diminished with a dry cough which is relieved with throat lozenge. Pt in NSR, VSS.  Pt toleratin

## 2021-06-21 NOTE — ANESTHESIA PROCEDURE NOTES
Arterial Line  Performed by: Javi Scott MD  Authorized by: Javi Scott MD     General Information and Staff    Procedure Start:  6/21/2021 12:08 PM  Procedure End:  6/21/2021 12:14 PM  Anesthesiologist:  MD Ivone Garcia

## 2021-06-22 VITALS
SYSTOLIC BLOOD PRESSURE: 122 MMHG | BODY MASS INDEX: 21.72 KG/M2 | WEIGHT: 140 LBS | DIASTOLIC BLOOD PRESSURE: 74 MMHG | RESPIRATION RATE: 17 BRPM | HEART RATE: 86 BPM | TEMPERATURE: 98 F | OXYGEN SATURATION: 96 % | HEIGHT: 67.5 IN

## 2021-06-22 PROCEDURE — 80048 BASIC METABOLIC PNL TOTAL CA: CPT | Performed by: SURGERY

## 2021-06-22 PROCEDURE — 85027 COMPLETE CBC AUTOMATED: CPT | Performed by: SURGERY

## 2021-06-22 NOTE — PLAN OF CARE
Patient awake, alert and oriented x4. S/p carotid surgery, incision covered with skin glue, no bleeding noted. PRN pain medication given. Vital signs remains stable. Will continue to monitor the patient.

## 2021-06-22 NOTE — OPERATIVE REPORT
Vascular Surgery Op Note  Patients Name:    Vamshi    Operating Physician: To Bonds MD  CSN:    [id number]                                                           Location:  OR  MRN:     [id number] 2 facial veins were identified circumferentially dissected and ligated with the use of 3-0 silk suture. The retractors were then readjusted.   Dr. Angel Mensah proceeded with dissection with sharp dissection obtaining control of the common carotid artery with pl

## 2021-06-22 NOTE — BRIEF OP NOTE
Pre-Operative Diagnosis: carotid body tumor     Post-Operative Diagnosis: same      Procedure Performed:   surgical resection of left carotid body tumor    Surgeon(s) and Role:     Oliva Urbano MD - Primary     * Naima Levy MD    Assistant(s):

## 2021-06-22 NOTE — PLAN OF CARE
Pt up in halls, neuro intact, vss, afebrile. Incision to neck intact, painted with betadine. No drainage noted.  MD to see pt, jamshid to bedside, pt able to go home      Problem: Patient/Family Goals  Goal: Patient/Family Long Term Goal  Description: Ivone

## 2021-06-23 ENCOUNTER — PATIENT OUTREACH (OUTPATIENT)
Dept: CASE MANAGEMENT | Age: 76
End: 2021-06-23

## 2021-06-23 DIAGNOSIS — Z02.9 ENCOUNTERS FOR ADMINISTRATIVE PURPOSE: ICD-10-CM

## 2021-06-23 PROCEDURE — 1111F DSCHRG MED/CURRENT MED MERGE: CPT

## 2021-06-23 NOTE — PROGRESS NOTES
NCM attempted to contact patient for hospital follow up. Unable to leave message as line rings with no option to leave a message. NCM will try again later.

## 2021-06-23 NOTE — OPERATIVE REPORT
[facility]    PATIENT'S NAME: Alberto Wheeler   ATTENDING PHYSICIAN: Chio Montes M.D. OPERATING PHYSICIAN: Chio Montes M.D.    PATIENT ACCOUNT#:   [de-identified]    LOCATION:  [address] [place] [address]  MEDICAL RECORD #:   [id number]       DATE OF retracted posterolaterally. The internal jugular vein was identified, and the facial veins were identified and ligated with 3-0 silk sutures.   Retractors were then readjusted to identify the carotid, the common carotid artery and the obvious carotid body

## 2021-06-24 ENCOUNTER — TELEPHONE (OUTPATIENT)
Dept: FAMILY MEDICINE CLINIC | Facility: CLINIC | Age: 76
End: 2021-06-24

## 2021-06-24 NOTE — TELEPHONE ENCOUNTER
Please notify pt f/u ov is recomeneded for sometime next week. Can schedule with another provider if he is agreeable.  O/w can add to my schedule on 7/6/2021 at 10:20 am.

## 2021-06-24 NOTE — TELEPHONE ENCOUNTER
Dr. Sonali Holliday,  Please advise    Patient has appt with Dr. Mykel Rocha on 7/7/21. Your first available appt is 7/9/21. Do you want us to try to schedule with another provider? It sounds like patient doesn't really want to schedule an appointment.

## 2021-06-24 NOTE — TELEPHONE ENCOUNTER
KARMEN, Spoke to pt for TCM today. Pt does not have HFU appt scheduled at this time. He declined to schedule stating that he is following up with surgeon Dr. Deja Godinez at this time and is doing well.   TCM/HFU appt recommended by 6/29/2021 as pt is a high risk f

## 2021-06-24 NOTE — PROGRESS NOTES
Initial Post Discharge Follow Up   Discharge Date: 6/22/21  Contact Date: 6/23/2021    Consent Verification:  Assessment Completed With: Patient  HIPAA Verified?   Yes    Discharge Dx:   Surgical resection of left carotid body tumor    Was TCC ordered: n No    Referrals/orders at D/C:  Home Health/Services ordered at D/C? No    DME ordered at D/C? No       Needs post D/C:   Now that you are home, are there any needs or concerns you need addressed before your next visit with your PCP?  (DME, meds, disease c reason as to why you cannot make your appointments?    No     NCM Reviewed upcoming Specialist Appt with patient     Yes, Dr. Kim Larry on 7/7/2021         Interventions by NCM: NCM reviewed discharge instructions and when to seek medical attention with the pa

## 2021-06-25 NOTE — H&P
[facility]  Vascular Surgery Consultation    Melissa Romero Patient Status:  Inpatient    1945 MRN [id number]   [facility] 6NE-A Attending No att. providers found   [facility] Day # 1 PCP Roque Lopez MD         History of Presen sputum, trouble breathing/SOB, chest pain, ANGEL  GI: denies abdominal pain, heartburn, diarrhea, blood in bm, tarry bm, constipation,    : denies difficulty urinating, pain, blood in urine, or frequency  SKIN: denies any unusual skin lesions or rashes  MU

## 2021-06-28 NOTE — TELEPHONE ENCOUNTER
VM left for Pt informing him hospital is needed  As per 's G notes.   (Dr Laura Babin has open appointments for 6-30-21

## 2021-07-19 RX ORDER — LEVOTHYROXINE SODIUM 0.12 MG/1
TABLET ORAL
Qty: 90 TABLET | Refills: 1 | Status: SHIPPED | OUTPATIENT
Start: 2021-07-19 | End: 2022-01-14

## 2021-07-19 NOTE — TELEPHONE ENCOUNTER
LOV 6/15/2021      LAST LAB 6/10/2021    LAST RX   LEVOTHYROXINE SODIUM 125 MCG Oral Tab 90 tablet 0 4/14/2021         Next OV   Future Appointments   Date Time Provider Geoff Zamora   9/17/2021  9:00 AM Yun Suárez MD [facility]   9/30/202

## 2021-09-13 ENCOUNTER — TELEPHONE (OUTPATIENT)
Dept: FAMILY MEDICINE CLINIC | Facility: CLINIC | Age: 76
End: 2021-09-13

## 2021-09-13 DIAGNOSIS — R73.01 IFG (IMPAIRED FASTING GLUCOSE): ICD-10-CM

## 2021-09-13 DIAGNOSIS — D75.89 MACROCYTOSIS: Primary | ICD-10-CM

## 2021-09-13 DIAGNOSIS — Z51.81 ENCOUNTER FOR MEDICATION MONITORING: ICD-10-CM

## 2021-09-13 DIAGNOSIS — I10 ESSENTIAL HYPERTENSION: ICD-10-CM

## 2021-09-13 NOTE — TELEPHONE ENCOUNTER
Dr. Francois Garza,  Please advise if labs are needed prior to appt    LOV 6/15/21      Annual PE   +14  Instructions  Return in 3 months (on 9/15/2021) for Follow-up Medication.     Last Labs 6/10/21    Future Appointments   Date Time Provider Geoff Zamora

## 2021-09-13 NOTE — TELEPHONE ENCOUNTER
Patient is scheduled for a f/u on 9/17. He wants to know if he is supposed to get labs done. No orders are placed.

## 2021-09-15 ENCOUNTER — PATIENT MESSAGE (OUTPATIENT)
Dept: FAMILY MEDICINE CLINIC | Facility: CLINIC | Age: 76
End: 2021-09-15

## 2021-09-15 LAB
ABSOLUTE BASOPHILS: 8 CELLS/UL (ref 0–200)
ABSOLUTE EOSINOPHILS: 50 CELLS/UL (ref 15–500)
ABSOLUTE LYMPHOCYTES: 1243 CELLS/UL (ref 850–3900)
ABSOLUTE MONOCYTES: 798 CELLS/UL (ref 200–950)
ABSOLUTE NEUTROPHILS: 2100 CELLS/UL (ref 1500–7800)
ALBUMIN/GLOBULIN RATIO: 1.5 (CALC) (ref 1–2.5)
ALBUMIN: 4.1 G/DL (ref 3.6–5.1)
ALKALINE PHOSPHATASE: 65 U/L (ref 35–144)
ALT: 11 U/L (ref 9–46)
AST: 11 U/L (ref 10–35)
BASOPHILS: 0.2 %
BILIRUBIN, TOTAL: 0.5 MG/DL (ref 0.2–1.2)
BUN: 18 MG/DL (ref 7–25)
CALCIUM: 9.6 MG/DL (ref 8.6–10.3)
CARBON DIOXIDE: 28 MMOL/L (ref 20–32)
CHLORIDE: 105 MMOL/L (ref 98–110)
CREATININE: 0.83 MG/DL (ref 0.7–1.18)
EGFR IF AFRICN AM: 101 ML/MIN/1.73M2
EGFR IF NONAFRICN AM: 87 ML/MIN/1.73M2
EOSINOPHILS: 1.2 %
FOLATE, SERUM: 14.2 NG/ML
GLOBULIN: 2.7 G/DL (CALC) (ref 1.9–3.7)
GLUCOSE: 100 MG/DL (ref 65–99)
HEMATOCRIT: 45.6 % (ref 38.5–50)
HEMOGLOBIN A1C: 5.6 % OF TOTAL HGB
HEMOGLOBIN: 14.9 G/DL (ref 13.2–17.1)
LYMPHOCYTES: 29.6 %
MCH: 31.8 PG (ref 27–33)
MCHC: 32.7 G/DL (ref 32–36)
MCV: 97.2 FL (ref 80–100)
MONOCYTES: 19 %
MPV: 9 FL (ref 7.5–12.5)
NEUTROPHILS: 50 %
PLATELET COUNT: 271 THOUSAND/UL (ref 140–400)
POTASSIUM: 5.2 MMOL/L (ref 3.5–5.3)
PROTEIN, TOTAL: 6.8 G/DL (ref 6.1–8.1)
RDW: 11.7 % (ref 11–15)
RED BLOOD CELL COUNT: 4.69 MILLION/UL (ref 4.2–5.8)
SODIUM: 139 MMOL/L (ref 135–146)
VITAMIN B12: 286 PG/ML (ref 200–1100)
WHITE BLOOD CELL COUNT: 4.2 THOUSAND/UL (ref 3.8–10.8)

## 2021-09-15 NOTE — TELEPHONE ENCOUNTER
LOV 06-15-21    Please see labs resulted 09-14-21 and advise. Has appt with you on Friday morning. Would you prefer to discuss at appt?

## 2021-09-15 NOTE — TELEPHONE ENCOUNTER
From: Kelly Romero  To: Miquel Iglesias MD  Sent: 9/15/2021 2:17 PM CDT  Subject: Question regarding CBC WITH DIFFERENTIAL WITH PLATELET    Dr Cielo Hernandez,  Concerned about Bobs red blood cells and even more his white.  There are other ones that low low and

## 2021-09-17 ENCOUNTER — OFFICE VISIT (OUTPATIENT)
Dept: FAMILY MEDICINE CLINIC | Facility: CLINIC | Age: 76
End: 2021-09-17
Payer: COMMERCIAL

## 2021-09-17 VITALS
SYSTOLIC BLOOD PRESSURE: 122 MMHG | RESPIRATION RATE: 16 BRPM | OXYGEN SATURATION: 97 % | HEIGHT: 67.5 IN | HEART RATE: 78 BPM | BODY MASS INDEX: 23.44 KG/M2 | WEIGHT: 151.13 LBS | DIASTOLIC BLOOD PRESSURE: 70 MMHG | TEMPERATURE: 97 F

## 2021-09-17 DIAGNOSIS — E53.8 B12 DEFICIENCY: ICD-10-CM

## 2021-09-17 DIAGNOSIS — E03.9 ACQUIRED HYPOTHYROIDISM: ICD-10-CM

## 2021-09-17 DIAGNOSIS — R09.82 POST-NASAL DRIP: ICD-10-CM

## 2021-09-17 DIAGNOSIS — Z51.81 ENCOUNTER FOR MEDICATION MONITORING: ICD-10-CM

## 2021-09-17 DIAGNOSIS — J30.89 ALLERGIC RHINITIS DUE TO OTHER ALLERGIC TRIGGER, UNSPECIFIED SEASONALITY: ICD-10-CM

## 2021-09-17 DIAGNOSIS — I10 ESSENTIAL HYPERTENSION: Primary | ICD-10-CM

## 2021-09-17 DIAGNOSIS — D44.6 CAROTID BODY TUMOR (HCC): ICD-10-CM

## 2021-09-17 DIAGNOSIS — Z85.46 HISTORY OF PROSTATE CANCER: ICD-10-CM

## 2021-09-17 DIAGNOSIS — C67.9 UROTHELIAL CARCINOMA OF BLADDER (HCC): ICD-10-CM

## 2021-09-17 PROCEDURE — 3074F SYST BP LT 130 MM HG: CPT | Performed by: FAMILY MEDICINE

## 2021-09-17 PROCEDURE — 99214 OFFICE O/P EST MOD 30 MIN: CPT | Performed by: FAMILY MEDICINE

## 2021-09-17 PROCEDURE — 96372 THER/PROPH/DIAG INJ SC/IM: CPT | Performed by: FAMILY MEDICINE

## 2021-09-17 PROCEDURE — 3008F BODY MASS INDEX DOCD: CPT | Performed by: FAMILY MEDICINE

## 2021-09-17 PROCEDURE — 3078F DIAST BP <80 MM HG: CPT | Performed by: FAMILY MEDICINE

## 2021-09-17 RX ORDER — CYANOCOBALAMIN 1000 UG/ML
1000 INJECTION INTRAMUSCULAR; SUBCUTANEOUS ONCE
Status: COMPLETED | OUTPATIENT
Start: 2021-09-17 | End: 2021-09-17

## 2021-09-17 RX ADMIN — CYANOCOBALAMIN 1000 MCG: 1000 INJECTION INTRAMUSCULAR; SUBCUTANEOUS at 09:57:00

## 2021-09-17 NOTE — PROGRESS NOTES
Jazz Morillo is a 70year old male. HPI:  Patient is here for follow-up on recent labs and medication. States doing fine. Taking levothyroxine daily. No unusual fatigue. Occasionally misses lisinopril dose if does not take on time.   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 9.  IFG  Fasting blood sugar slightly elevated at 100. Hemoglobin A1c normal.  Should improve with diet and exercise, reviewed.     10.History of Prostate

## 2021-09-24 RX ORDER — MONTELUKAST SODIUM 10 MG/1
10 TABLET ORAL NIGHTLY
Qty: 30 TABLET | Refills: 4 | Status: SHIPPED | OUTPATIENT
Start: 2021-09-24 | End: 2022-03-27

## 2021-09-24 NOTE — TELEPHONE ENCOUNTER
LOV 9/17/2021    LAST RX  Montelukast Sodium 10 MG Oral Tab 30 tablet 2 6/9/2021         Next OV   Future Appointments   Date Time Provider Geoff Kuhni   9/27/2021  4:00 PM Franki Hernandez MD [facility]   9/30/2021  1:00 PM Silvano Seo MD [id number] [employer]   1/17/2022  9:00 AM Armaan Ken MD [facility]         PROTOCOL failed

## 2021-09-28 ENCOUNTER — TELEPHONE (OUTPATIENT)
Dept: FAMILY MEDICINE CLINIC | Facility: CLINIC | Age: 76
End: 2021-09-28

## 2021-09-28 NOTE — PROGRESS NOTES
[facility] Progress Note      Patient Name: Elzbieta Bryant   YOB: 1945  Medical Record Number: [id number]  Attending Physician: Anna Avitia M.D.        Date of Visit: 9/30/2021      Chief Complaint  Prostate cance physician)  Sister with breast cancer; sister with colon cancer; father with prostate cancer. Social History (historical data, reviewed by physician)  Previous tobacco use but quit 2015; uses alcohol daily.       Current Medications   montelukast 10 MG O Mood and affect appropriate.     Laboratory  Recent Results (from the past 672 hour(s))   CBC WITH DIFFERENTIAL WITH PLATELET    Collection Time: 09/14/21  8:51 AM   Result Value Ref Range    WHITE BLOOD CELL COUNT 4.2 3.8 - 10.8 Thousand/uL    RED BLO Time: 09/14/21  8:51 AM   Result Value Ref Range    HEMOGLOBIN A1c 5.6 <5.7 % of total Hgb     Impression and Plan   1. Prostate cancer: PSA has increased. It remains less than 0.2 ng/ml so imaging with PSMA is not yet advised.  I recommend we continue to

## 2021-09-28 NOTE — TELEPHONE ENCOUNTER
Received pre-op paperwork from [facility]Urology [facility] pt having surgery on 10/5/21 please advise where to schedule? (paperwork in  folder)

## 2021-09-28 NOTE — TELEPHONE ENCOUNTER
Addendum made for medical clearance to OV note from 9/17/2021. See prev TE and notify Urology office.

## 2021-09-28 NOTE — TELEPHONE ENCOUNTER
SARAH advising Leonel Pac at Dr. Rolf Brar office that Dr. Felix Arrington has made addendum to her note of 9/17/21 as requested for preop H&P. Triage number given for any additional questions.

## 2021-09-28 NOTE — TELEPHONE ENCOUNTER
Kenyetta Issa from Dr. Isrrael Contreras office [facility]) called. Asking if Dr Cynthia Joyce would amend her notes from the 9-17-21 visit. Wants it to say Pt is in good health to have a procedure with them on 10-5-21.     Procedure is at the [facility]

## 2021-09-28 NOTE — TELEPHONE ENCOUNTER
Addendum made to office visit note from 9/17/2021 as < 30 days from procedure. No separate appt needed for preop H&P. Betty Beltran

## 2021-09-30 ENCOUNTER — OFFICE VISIT (OUTPATIENT)
Dept: HEMATOLOGY/ONCOLOGY | Facility: HOSPITAL | Age: 76
End: 2021-09-30
Attending: SPECIALIST
Payer: MEDICARE

## 2021-09-30 VITALS
OXYGEN SATURATION: 94 % | TEMPERATURE: 97 F | SYSTOLIC BLOOD PRESSURE: 120 MMHG | DIASTOLIC BLOOD PRESSURE: 80 MMHG | WEIGHT: 151 LBS | HEART RATE: 81 BPM | BODY MASS INDEX: 23 KG/M2 | RESPIRATION RATE: 18 BRPM

## 2021-09-30 DIAGNOSIS — C61 PROSTATE CANCER (HCC): Primary | ICD-10-CM

## 2021-09-30 PROCEDURE — 99214 OFFICE O/P EST MOD 30 MIN: CPT | Performed by: SPECIALIST

## 2021-09-30 NOTE — PROGRESS NOTES
Patient is here today for follow up with Caitlyn Field for prostate cancer and thrombocythemia . Patient denies pain. Stated he is feeling good. Received vitamin B12 shot from PCP. Follows up with Urology for Carcinoma of bladder and prostate cancer.   Keira Leon

## 2021-10-05 ENCOUNTER — LAB REQUISITION (OUTPATIENT)
Dept: LAB | Facility: HOSPITAL | Age: 76
End: 2021-10-05
Payer: MEDICARE

## 2021-10-05 DIAGNOSIS — C67.9 MALIGNANT NEOPLASM OF BLADDER, UNSPECIFIED (HCC): ICD-10-CM

## 2021-10-05 PROCEDURE — 88305 TISSUE EXAM BY PATHOLOGIST: CPT | Performed by: UROLOGY

## 2021-10-12 ENCOUNTER — TELEPHONE (OUTPATIENT)
Dept: FAMILY MEDICINE CLINIC | Facility: CLINIC | Age: 76
End: 2021-10-12

## 2021-10-12 NOTE — TELEPHONE ENCOUNTER
Specimen:    Bladder, Bladder tumor                                                                     Final Diagnosis:   Urinary bladder, biopsy:  -Non-invasive low-grade papillary urothelial carcinoma.   -Muscularis propria (detrusor muscle) is not visua

## 2021-10-12 NOTE — TELEPHONE ENCOUNTER
Pt's wife called asking to speak to Dr Jv Victor. She said Pt's cancer is back,  They need to discuss the choice of the specialist.  She is second guessing herself on him.

## 2021-10-13 NOTE — TELEPHONE ENCOUNTER
Spoke with wife. Reviewed recent bladder biopsy report with noninvasive low-grade papillary urothelial carcinoma. Had follow-up with urologist today. Feeling more comfortable after this.   Planning to do cystoscopy again in 3 months with close monitoring

## 2021-12-27 RX ORDER — LISINOPRIL 5 MG/1
5 TABLET ORAL DAILY
Qty: 90 TABLET | Refills: 1 | Status: SHIPPED | OUTPATIENT
Start: 2021-12-27 | End: 2022-01-17

## 2021-12-27 NOTE — TELEPHONE ENCOUNTER
LOV 9/17/2021      LAST LAB 10/2/2021    LAST RX   lisinopril 5 MG Oral Tab 90 tablet 1 6/15/2021         Next OV   Future Appointments   Date Time Provider Geoff Zamora   1/10/2022  9:00 AM Rin López MD [address]   1/17/2022

## 2022-01-09 NOTE — PROGRESS NOTES
THE [facility] Hematology Oncology Group Progress Note      Patient Name: Geeta Suárez   YOB: 1945  Medical Record Number: [id number]  Attending Physician: Wanda Shaffer M.D.        Date of Visit: 1/10/2022      Chief Complaint  Prostate cance lymphadenectomy; inguinal hernia repair; TURBT; left neck paraganglioma. Family History (historical data, reviewed by physician)  Sister with breast cancer; sister with colon cancer; father with prostate cancer.     Social History (historical data, Kirvin Cheeks radiation therapy, and short term androgen ablation. I recommend PSMA PET scan to identify site and extent of disease. Patient would be a candidate for local therapy in the setting of local recurrence or oligometastatic disease.      Planned Follow Up   Jayme Crane

## 2022-01-10 ENCOUNTER — OFFICE VISIT (OUTPATIENT)
Dept: HEMATOLOGY/ONCOLOGY | Facility: HOSPITAL | Age: 77
End: 2022-01-10
Attending: SPECIALIST
Payer: MEDICARE

## 2022-01-10 VITALS
OXYGEN SATURATION: 94 % | BODY MASS INDEX: 25.55 KG/M2 | HEIGHT: 65.98 IN | TEMPERATURE: 97 F | SYSTOLIC BLOOD PRESSURE: 125 MMHG | HEART RATE: 84 BPM | RESPIRATION RATE: 16 BRPM | DIASTOLIC BLOOD PRESSURE: 80 MMHG | WEIGHT: 159 LBS

## 2022-01-10 DIAGNOSIS — C61 PROSTATE CANCER (HCC): Primary | ICD-10-CM

## 2022-01-10 DIAGNOSIS — Z85.46 HISTORY OF PROSTATE CANCER: ICD-10-CM

## 2022-01-10 DIAGNOSIS — R97.21 RISING PSA FOLLOWING TREATMENT FOR MALIGNANT NEOPLASM OF PROSTATE: ICD-10-CM

## 2022-01-10 DIAGNOSIS — R97.20 RISING PSA LEVEL: ICD-10-CM

## 2022-01-10 LAB — PSA SERPL-MCNC: 0.4 NG/ML (ref ?–4)

## 2022-01-10 PROCEDURE — 99213 OFFICE O/P EST LOW 20 MIN: CPT | Performed by: SPECIALIST

## 2022-01-10 RX ORDER — MELATONIN
1000 DAILY
COMMUNITY

## 2022-01-10 NOTE — PROGRESS NOTES
Patient is here today for follow up  with Donna Krueger for Prostate Cancer. Patient denies pain. Stated he I feeling good. Medication list and medical history were reviewed and updated.      Education Record    Learner:  Patient and spouse    Disease / Rowena Lanes

## 2022-01-13 LAB
ABSOLUTE BASOPHILS: 10 CELLS/UL (ref 0–200)
ABSOLUTE EOSINOPHILS: 50 CELLS/UL (ref 15–500)
ABSOLUTE LYMPHOCYTES: 1525 CELLS/UL (ref 850–3900)
ABSOLUTE MONOCYTES: 930 CELLS/UL (ref 200–950)
ABSOLUTE NEUTROPHILS: 2485 CELLS/UL (ref 1500–7800)
BASOPHILS: 0.2 %
BUN: 16 MG/DL (ref 7–25)
CALCIUM: 9.3 MG/DL (ref 8.6–10.3)
CARBON DIOXIDE: 29 MMOL/L (ref 20–32)
CHLORIDE: 105 MMOL/L (ref 98–110)
CREATININE: 0.87 MG/DL (ref 0.7–1.18)
EGFR IF AFRICN AM: 99 ML/MIN/1.73M2
EGFR IF NONAFRICN AM: 85 ML/MIN/1.73M2
EOSINOPHILS: 1 %
GLUCOSE: 104 MG/DL (ref 65–99)
HEMATOCRIT: 43.8 % (ref 38.5–50)
HEMOGLOBIN: 15.3 G/DL (ref 13.2–17.1)
LYMPHOCYTES: 30.5 %
MCH: 33.9 PG (ref 27–33)
MCHC: 34.9 G/DL (ref 32–36)
MCV: 97.1 FL (ref 80–100)
MONOCYTES: 18.6 %
MPV: 9.1 FL (ref 7.5–12.5)
NEUTROPHILS: 49.7 %
PLATELET COUNT: 264 THOUSAND/UL (ref 140–400)
POTASSIUM: 4.5 MMOL/L (ref 3.5–5.3)
RDW: 11.7 % (ref 11–15)
RED BLOOD CELL COUNT: 4.51 MILLION/UL (ref 4.2–5.8)
SODIUM: 140 MMOL/L (ref 135–146)
T4, FREE: 1.4 NG/DL (ref 0.8–1.8)
TSH: 0.71 MIU/L (ref 0.4–4.5)
WHITE BLOOD CELL COUNT: 5 THOUSAND/UL (ref 3.8–10.8)

## 2022-01-14 RX ORDER — LEVOTHYROXINE SODIUM 0.12 MG/1
TABLET ORAL
Qty: 90 TABLET | Refills: 1 | Status: SHIPPED | OUTPATIENT
Start: 2022-01-14

## 2022-01-14 NOTE — TELEPHONE ENCOUNTER
LOV 9/17/2021      LAST LAB 1/12/2022    LAST RX   LEVOTHYROXINE SODIUM 125 MCG Oral Tab 90 tablet 1 7/19/2021         Next OV   Future Appointments   Date Time Provider Geoff Zamora   1/17/2022  9:00 AM Lizzy Lockhart MD [facility] [address] [facility]   1/19/202

## 2022-01-17 ENCOUNTER — OFFICE VISIT (OUTPATIENT)
Dept: FAMILY MEDICINE CLINIC | Facility: CLINIC | Age: 77
End: 2022-01-17
Payer: COMMERCIAL

## 2022-01-17 VITALS
SYSTOLIC BLOOD PRESSURE: 128 MMHG | HEIGHT: 65.98 IN | WEIGHT: 159.25 LBS | OXYGEN SATURATION: 95 % | HEART RATE: 70 BPM | TEMPERATURE: 97 F | DIASTOLIC BLOOD PRESSURE: 80 MMHG | RESPIRATION RATE: 16 BRPM | BODY MASS INDEX: 25.59 KG/M2

## 2022-01-17 DIAGNOSIS — R05.3 CHRONIC COUGH: ICD-10-CM

## 2022-01-17 DIAGNOSIS — J43.2 CENTRILOBULAR EMPHYSEMA (HCC): ICD-10-CM

## 2022-01-17 DIAGNOSIS — R97.21 RISING PSA FOLLOWING TREATMENT FOR MALIGNANT NEOPLASM OF PROSTATE: ICD-10-CM

## 2022-01-17 DIAGNOSIS — R06.00 DOE (DYSPNEA ON EXERTION): Primary | ICD-10-CM

## 2022-01-17 DIAGNOSIS — E03.9 ACQUIRED HYPOTHYROIDISM: ICD-10-CM

## 2022-01-17 DIAGNOSIS — R73.01 IFG (IMPAIRED FASTING GLUCOSE): ICD-10-CM

## 2022-01-17 DIAGNOSIS — I77.79 DISSECTION OF MESENTERIC ARTERY (HCC): ICD-10-CM

## 2022-01-17 DIAGNOSIS — I70.0 ATHEROSCLEROSIS OF AORTA (HCC): ICD-10-CM

## 2022-01-17 DIAGNOSIS — C67.0 MALIGNANT NEOPLASM OF TRIGONE OF URINARY BLADDER (HCC): ICD-10-CM

## 2022-01-17 DIAGNOSIS — I10 ESSENTIAL HYPERTENSION: ICD-10-CM

## 2022-01-17 DIAGNOSIS — Z51.81 ENCOUNTER FOR MEDICATION MONITORING: ICD-10-CM

## 2022-01-17 PROCEDURE — 99214 OFFICE O/P EST MOD 30 MIN: CPT | Performed by: FAMILY MEDICINE

## 2022-01-17 PROCEDURE — 3074F SYST BP LT 130 MM HG: CPT | Performed by: FAMILY MEDICINE

## 2022-01-17 PROCEDURE — 3079F DIAST BP 80-89 MM HG: CPT | Performed by: FAMILY MEDICINE

## 2022-01-17 PROCEDURE — 3008F BODY MASS INDEX DOCD: CPT | Performed by: FAMILY MEDICINE

## 2022-01-17 RX ORDER — MONTELUKAST SODIUM 10 MG/1
10 TABLET ORAL NIGHTLY
Qty: 30 TABLET | Refills: 4 | Status: CANCELLED | OUTPATIENT
Start: 2022-01-17

## 2022-01-17 RX ORDER — ALBUTEROL SULFATE 90 UG/1
2 AEROSOL, METERED RESPIRATORY (INHALATION) EVERY 6 HOURS PRN
Qty: 18 G | Refills: 0 | Status: SHIPPED | OUTPATIENT
Start: 2022-01-17

## 2022-01-17 RX ORDER — LOSARTAN POTASSIUM 50 MG/1
50 TABLET ORAL DAILY
Qty: 30 TABLET | Refills: 2 | Status: SHIPPED | OUTPATIENT
Start: 2022-01-17

## 2022-01-17 NOTE — PATIENT INSTRUCTIONS
STOP LISINOPRIL    START LOSARTAN 50MG DAILY IN AM    USE 2 PUFFS ALBUTEROL INHALER 15 MINS BEFORE EXERCISE/ACTIVITY AND EVERY 6 HOURS IF NEEDED FOR SHORTNESS OF BREATH.

## 2022-01-17 NOTE — PROGRESS NOTES
Arelis Aevry is a 78year old male. HPI:  Pt is here for f/u on meds and recent labs. Notes some SOB w/exertion for a few months. Notes with walking too fast or walking too long. Symptoms resolve with rest.   No CP, no palpitations.    Chronic post na HISTORY  02/2016    Robotic Assisted Lap Prostatectomy/Pelvic lymphadenoctomy   • OTHER SURGICAL HISTORY  1/31/2017    cyst excision left jaw   • OTHER SURGICAL HISTORY  12/2020    Cystoscopy under anesthesia, clot evacuation, bladder biopsy, fulguration, well nourished,in no apparent distress, pleasant affect  HEENT: atraumatic, normocephalic,  Conj clear  OMM, throat without erythema, clear postnasal drip noted.   No exudates  NECK: supple,no adenopathy,noTM, healed scars  LUNGS: clear to auscultation  CAR

## 2022-01-21 ENCOUNTER — HOSPITAL ENCOUNTER (OUTPATIENT)
Dept: NUCLEAR MEDICINE | Facility: HOSPITAL | Age: 77
Discharge: HOME | End: 2022-01-21
Attending: SPECIALIST
Payer: MEDICARE

## 2022-01-21 DIAGNOSIS — R97.21 RISING PSA FOLLOWING TREATMENT FOR MALIGNANT NEOPLASM OF PROSTATE: ICD-10-CM

## 2022-01-21 DIAGNOSIS — Z85.46 HISTORY OF PROSTATE CANCER: ICD-10-CM

## 2022-01-21 DIAGNOSIS — C61 PROSTATE CANCER (HCC): ICD-10-CM

## 2022-01-21 PROCEDURE — 78815 PET IMAGE W/CT SKULL-THIGH: CPT | Performed by: SPECIALIST

## 2022-01-24 ENCOUNTER — TELEPHONE (OUTPATIENT)
Dept: GENETICS | Facility: HOSPITAL | Age: 77
End: 2022-01-24

## 2022-02-01 ENCOUNTER — HOSPITAL ENCOUNTER (OUTPATIENT)
Dept: RADIATION ONCOLOGY | Facility: HOSPITAL | Age: 77
Discharge: HOME | End: 2022-02-01
Attending: INTERNAL MEDICINE
Payer: MEDICARE

## 2022-02-01 VITALS
HEART RATE: 80 BPM | TEMPERATURE: 97 F | RESPIRATION RATE: 20 BRPM | HEIGHT: 68 IN | OXYGEN SATURATION: 93 % | BODY MASS INDEX: 23.46 KG/M2 | WEIGHT: 154.81 LBS | DIASTOLIC BLOOD PRESSURE: 89 MMHG | SYSTOLIC BLOOD PRESSURE: 137 MMHG

## 2022-02-01 DIAGNOSIS — C79.51 BONE METASTASIS (HCC): Primary | ICD-10-CM

## 2022-02-01 PROCEDURE — 99214 OFFICE O/P EST MOD 30 MIN: CPT

## 2022-02-01 NOTE — PATIENT INSTRUCTIONS
- OUR RADIATION THERAPISTS WILL CALL YOU TO SCHEDULE YOUR CT SIMULATION IN OUR OFFICE.     - PLEASE CALL WITH ANY QUESTIONS OR CONCERNS. [phone]

## 2022-02-04 ENCOUNTER — HOSPITAL ENCOUNTER (OUTPATIENT)
Dept: RADIATION ONCOLOGY | Facility: HOSPITAL | Age: 77
Discharge: HOME | End: 2022-02-04
Attending: INTERNAL MEDICINE
Payer: MEDICARE

## 2022-02-04 PROCEDURE — 77399 UNLISTED PX MED RADJ PHYSICS: CPT | Performed by: INTERNAL MEDICINE

## 2022-02-04 PROCEDURE — 77470 SPECIAL RADIATION TREATMENT: CPT | Performed by: INTERNAL MEDICINE

## 2022-02-04 PROCEDURE — 77334 RADIATION TREATMENT AID(S): CPT | Performed by: INTERNAL MEDICINE

## 2022-02-08 ENCOUNTER — TELEPHONE (OUTPATIENT)
Dept: RADIATION ONCOLOGY | Facility: HOSPITAL | Age: 77
End: 2022-02-08

## 2022-02-08 PROCEDURE — 77300 RADIATION THERAPY DOSE PLAN: CPT | Performed by: INTERNAL MEDICINE

## 2022-02-08 PROCEDURE — 77293 RESPIRATOR MOTION MGMT SIMUL: CPT | Performed by: INTERNAL MEDICINE

## 2022-02-08 PROCEDURE — 77338 DESIGN MLC DEVICE FOR IMRT: CPT | Performed by: INTERNAL MEDICINE

## 2022-02-08 PROCEDURE — 77301 RADIOTHERAPY DOSE PLAN IMRT: CPT | Performed by: INTERNAL MEDICINE

## 2022-02-21 ENCOUNTER — DOCUMENTATION ONLY (OUTPATIENT)
Dept: RADIATION ONCOLOGY | Facility: HOSPITAL | Age: 77
End: 2022-02-21

## 2022-02-21 ENCOUNTER — HOSPITAL ENCOUNTER (OUTPATIENT)
Dept: RADIATION ONCOLOGY | Facility: HOSPITAL | Age: 77
Discharge: HOME | End: 2022-02-21
Attending: INTERNAL MEDICINE
Payer: MEDICARE

## 2022-02-21 PROCEDURE — 77373 STRTCTC BDY RAD THER TX DLVR: CPT | Performed by: INTERNAL MEDICINE

## 2022-02-22 ENCOUNTER — OFFICE VISIT (OUTPATIENT)
Dept: FAMILY MEDICINE CLINIC | Facility: CLINIC | Age: 77
End: 2022-02-22
Payer: COMMERCIAL

## 2022-02-22 VITALS
BODY MASS INDEX: 24.25 KG/M2 | RESPIRATION RATE: 16 BRPM | WEIGHT: 160 LBS | OXYGEN SATURATION: 94 % | TEMPERATURE: 97 F | DIASTOLIC BLOOD PRESSURE: 76 MMHG | HEIGHT: 68 IN | SYSTOLIC BLOOD PRESSURE: 118 MMHG | HEART RATE: 89 BPM

## 2022-02-22 DIAGNOSIS — E03.9 ACQUIRED HYPOTHYROIDISM: ICD-10-CM

## 2022-02-22 DIAGNOSIS — C61 PROSTATE CANCER (HCC): ICD-10-CM

## 2022-02-22 DIAGNOSIS — I10 ESSENTIAL HYPERTENSION: Primary | ICD-10-CM

## 2022-02-22 DIAGNOSIS — J43.2 CENTRILOBULAR EMPHYSEMA (HCC): ICD-10-CM

## 2022-02-22 DIAGNOSIS — R05.3 CHRONIC COUGH: ICD-10-CM

## 2022-02-22 DIAGNOSIS — Z51.81 ENCOUNTER FOR MEDICATION MONITORING: ICD-10-CM

## 2022-02-22 DIAGNOSIS — E53.8 B12 DEFICIENCY: ICD-10-CM

## 2022-02-22 DIAGNOSIS — C79.51 BONE METASTASIS (HCC): ICD-10-CM

## 2022-02-22 PROCEDURE — 3078F DIAST BP <80 MM HG: CPT | Performed by: FAMILY MEDICINE

## 2022-02-22 PROCEDURE — 99213 OFFICE O/P EST LOW 20 MIN: CPT | Performed by: FAMILY MEDICINE

## 2022-02-22 PROCEDURE — 3074F SYST BP LT 130 MM HG: CPT | Performed by: FAMILY MEDICINE

## 2022-02-22 PROCEDURE — 3008F BODY MASS INDEX DOCD: CPT | Performed by: FAMILY MEDICINE

## 2022-02-23 ENCOUNTER — HOSPITAL ENCOUNTER (OUTPATIENT)
Dept: RADIATION ONCOLOGY | Facility: HOSPITAL | Age: 77
Discharge: HOME | End: 2022-02-23
Attending: INTERNAL MEDICINE
Payer: MEDICARE

## 2022-02-23 PROCEDURE — 77373 STRTCTC BDY RAD THER TX DLVR: CPT | Performed by: INTERNAL MEDICINE

## 2022-02-25 ENCOUNTER — APPOINTMENT (OUTPATIENT)
Dept: HEMATOLOGY/ONCOLOGY | Facility: HOSPITAL | Age: 77
End: 2022-02-25
Attending: GENETIC COUNSELOR, MS
Payer: MEDICARE

## 2022-02-25 ENCOUNTER — HOSPITAL ENCOUNTER (OUTPATIENT)
Dept: RADIATION ONCOLOGY | Facility: HOSPITAL | Age: 77
Discharge: HOME | End: 2022-02-25
Attending: INTERNAL MEDICINE
Payer: MEDICARE

## 2022-02-25 ENCOUNTER — APPOINTMENT (OUTPATIENT)
Dept: GENETICS | Facility: HOSPITAL | Age: 77
End: 2022-02-25
Payer: MEDICARE

## 2022-02-25 VITALS
SYSTOLIC BLOOD PRESSURE: 125 MMHG | DIASTOLIC BLOOD PRESSURE: 85 MMHG | TEMPERATURE: 98 F | RESPIRATION RATE: 20 BRPM | OXYGEN SATURATION: 96 % | HEART RATE: 88 BPM

## 2022-02-25 DIAGNOSIS — C79.51 BONE METASTASIS (HCC): Primary | ICD-10-CM

## 2022-02-25 PROCEDURE — 77373 STRTCTC BDY RAD THER TX DLVR: CPT | Performed by: INTERNAL MEDICINE

## 2022-02-25 RX ORDER — HYDROCODONE BITARTRATE AND ACETAMINOPHEN 5; 325 MG/1; MG/1
1 TABLET ORAL EVERY 4 HOURS PRN
Qty: 5 TABLET | Refills: 0 | Status: SHIPPED | OUTPATIENT
Start: 2022-02-25

## 2022-02-25 NOTE — PATIENT INSTRUCTIONS
- WE WILL CALL TO SCHEDULE YOU FOR A FOLLOW-UP WITH DR. SU 6-WEEKS AFTER RADIATION COMPLETION  - SIDE EFFECTS OF RADIATION WILL GRADUALLY SUBSIDE. IT MAY TAKE 1- 2 WEEKS POST-RADIATION FOR YOU TO NOTICE CHANGES SUCH AS A DECREASE IN YOUR FATIGUE LEVEL, DECREASE IN PAIN TO TREATED SITE, DECREASE IN REDNESS AND/OR DRYNESS OF TREATED SKIN AREA. - CALL THE NURSE LINE AT [phone] IF YOU HAVE ANY QUESTIONS/CONCERNS REGARDING RADIATION THERAPY.

## 2022-02-25 NOTE — PROGRESS NOTES
[facility] Radiation Treatment Management Note 1-5    Patient:  Tiffanie Barbosa  Age:  78year old  Visit Diagnosis:  Recurrent prostate CA  Primary Rad/Onc:  Dr. Zaida Saul    Site Delivered Dose (cGy) Prescribed Dose (cGy) Fraction #   RIGHT 10TH RIB 2700 2700 3/3      First treatment date:   2/21/22  Concurrent chemotherapy:  N/A    Oncology Vitals 2/22/2022 2/22/2022 2/25/2022   Height 5' 8\" - -   Height 173 cm - -   Weight 160 lb - -   Weight 72.576 kg - -   BSA (m2) 1.86 m2 - -   /64 118/76 125/85   Pulse 89 - 88   Resp 16 - 20   Temp 97.4 - 97.8   SpO2 94 - 96   Pain Score - - 0   Some recent data might be hidden        Toxicities:  Fatigue Grade 0= None  Bone pain Grade 0= None  Gait disturbance Grade 0= None  Muscle weakness Grade 0= None    Nursing Note:  Pt tolerated tx well. Denies pain to tx area. No c/o dyspnea or skin irritation. Completed RT today, will be going to [facility] in March. AVS to give an review with pt. Marian Lopes RN    Physician Note:  Subjective:  -tolerated RT well, no issues      Objective:  Vitals noted   ECOG 0  NAD      Treatment setup imaging have been reviewed: Yes    Assessment/Plan:  -Tolerated RT without issue.  -Will send a few Norco to pharmacy bc he is going out of town, just in case he has a rib fracture. We discussed expected future toxicity. All questions answered.   RTC 6 weeks or sooner if needed    Zaida Saul MD

## 2022-03-07 ENCOUNTER — NURSE ONLY (OUTPATIENT)
Dept: HEMATOLOGY/ONCOLOGY | Facility: HOSPITAL | Age: 77
End: 2022-03-07
Attending: GENETIC COUNSELOR, MS
Payer: MEDICARE

## 2022-03-07 ENCOUNTER — GENETICS ENCOUNTER (OUTPATIENT)
Dept: GENETICS | Facility: HOSPITAL | Age: 77
End: 2022-03-07
Attending: GENETIC COUNSELOR, MS
Payer: MEDICARE

## 2022-03-07 DIAGNOSIS — C67.9 UROTHELIAL CARCINOMA OF BLADDER (HCC): ICD-10-CM

## 2022-03-07 DIAGNOSIS — D44.6 CAROTID BODY TUMOR (HCC): ICD-10-CM

## 2022-03-07 DIAGNOSIS — C61 PROSTATE CANCER (HCC): ICD-10-CM

## 2022-03-07 PROCEDURE — 96040 HC GENETIC COUNSELING EA 30 MIN: CPT | Performed by: GENETIC COUNSELOR, MS

## 2022-03-07 PROCEDURE — 36415 COLL VENOUS BLD VENIPUNCTURE: CPT

## 2022-03-16 NOTE — PROGRESS NOTES
Radiation Oncology Treatment Summary    Diagnosis: recurrent prostate cancer with oligometastasis to the right rib     Site: right 10th rib lesion    Dose: 2700 cGy in 3 fractions    Treatment Start Date: 2/21/22    Treatment End Date: 2/25/22    Elapsed Days: 4     Concurrent Treatments: none    Radiation Therapy Treatment Technique: 68-year-old male with resected high risk Evelyn 4+5 prostate cancer. He underwent salvage/adjuvant external beam radiation to the pelvis with a short course of ADT in 2016. His PSA remained undetectable until October 2020 and it has now risen to 0.4. F-18 PET shows a solitary small focus of activity in a right lateral rib that is felt to likely represent metastatic prostate cancer. He presented for the course of RT outlined below. The patient was treated in the supine position using a custom treatment platform for daily set-up and immobilization. The right 10th rib lesion was treated to 2700 cGy in 3 fractions using SBRT with 6 MV photons and treatment delivered every other day. IGRT: kV match and CBCT    Clinical Course: The treatment course was completed as prescribed. He did not experience any side effects. Follow-up: Return to clinic in 2 months with PSA or sooner if needed. Continue follow-up with other physicians as planned. For more information, or to request a detailed radiation treatment summary, please call Mata Gordon at our [place] location, [phone].

## 2022-03-31 ENCOUNTER — TELEPHONE (OUTPATIENT)
Dept: HEMATOLOGY/ONCOLOGY | Facility: HOSPITAL | Age: 77
End: 2022-03-31

## 2022-04-01 ENCOUNTER — GENETICS ENCOUNTER (OUTPATIENT)
Dept: HEMATOLOGY/ONCOLOGY | Facility: HOSPITAL | Age: 77
End: 2022-04-01

## 2022-04-01 NOTE — PROGRESS NOTES
Referring Provider:                    Jessie Peres MD     Additional Provider(s):              MD Gabbi Alva MD     Reason for Referral:  Henry Hernandez had genetic testing performed on 3/7/22 because of a personal and family history of cancer. Genetic Testing Result:  No known pathogenic variants were found in 84 genes including: AIP, ALK, APC, COLTON, AXIN2, BAP1, BARD1, BLM, BMPR1A, BRCA1, BRCA2, BRIP1, CASR, CDC73, CDH1, CDK4, CDKN1B, CDKN1C, CDKN2A (p14ARF), CDKN2A (p.85ZKF4i), CEBPA, CHEK2, DICER1, DIS3L2, EGFR (sequencing only), FH, FLCN, GATA2, GPC3, GREM1 (promoter region deletion/duplication testing only), HOXB13 (sequencing only), HRAS, KIT,MAX,  MEN1, MET, MITF (sequencing only), MLH1, MSH2 (including EPCAM rearrangement testing), MSH6, MUTYH, NBN, NF1, NF2, PALB2, PDGRFA, PHOX2B, PMS2, POLD1, POLE, POT1, MVUTZ8D, PTCH1, PTEN, RAD50, RAD51C, RAD51D, RB1, RECQL4, RET, RUNX1, SDHAF2, SDHA (sequencing only), SDHB, SDHC, SDHD, SMAD4, SMARCA4, SMARCB1, SMARCE1, STK11, SUFU, TERC, TERT, LOZQ413, TP53, TSC1, TSC2, VHL, WRN, and WT1. Four variants of uncertain significance, FLCN c.850G>A (p.Cet412Bgb)(possibly mosaic), MSH6 c.1685A>G (p.Cdi529Lli), PALB2 c.37G>A (p.Xvc95Oaa), and WRN c.1531G>C (p.Muo703Tpq), were identified. Please refer to the report from [facility] for additional testing information. These results were discussed with Mr. Lien Peterson by phone on 4/1/22. Summary and Plan:  The etiology of Mr. Thomas Cm cancers remains unexplained. The limitations of the testing include the chance that a pathogenic variant in a gene other than those included in this panel might be the cause of cancer in Mr. Lien Peterson. It is not known if the FLCN, MSH6, PALB2 or WRN variants identified in Mr. Lien Peterson are associated with an increased risk for cancer or if they are benign polymorphisms.   At this time, no alteration in Mr. Guzman Flatness medical recommendations should be made based on these variants. As more families are tested and/or functional studies are performed, it is possible that these variants will be reclassified in the future either as benign or pathogenic. Mr. Johana Costello should contact me on an annual basis to see if the VUSs have been reclassified and to learn if there have been any updates in genetic testing that would apply to him. In the meantime, Mr. Johana Costello and his relatives should speak with their physicians to discuss recommended medical management according to their personal and family history.     Cc:  Aldair Machuca

## 2022-04-12 ENCOUNTER — APPOINTMENT (OUTPATIENT)
Dept: RADIATION ONCOLOGY | Facility: HOSPITAL | Age: 77
End: 2022-04-12
Attending: INTERNAL MEDICINE
Payer: MEDICARE

## 2022-04-25 ENCOUNTER — NURSE ONLY (OUTPATIENT)
Dept: HEMATOLOGY/ONCOLOGY | Facility: HOSPITAL | Age: 77
End: 2022-04-25
Attending: GENETIC COUNSELOR, MS
Payer: MEDICARE

## 2022-04-25 LAB — PSA SERPL-MCNC: 0.42 NG/ML (ref ?–4)

## 2022-04-25 PROCEDURE — 36415 COLL VENOUS BLD VENIPUNCTURE: CPT

## 2022-04-26 ENCOUNTER — HOSPITAL ENCOUNTER (OUTPATIENT)
Dept: RADIATION ONCOLOGY | Facility: HOSPITAL | Age: 77
Discharge: HOME | End: 2022-04-26
Attending: INTERNAL MEDICINE
Payer: MEDICARE

## 2022-04-26 VITALS
SYSTOLIC BLOOD PRESSURE: 119 MMHG | OXYGEN SATURATION: 95 % | DIASTOLIC BLOOD PRESSURE: 83 MMHG | HEART RATE: 80 BPM | WEIGHT: 155.63 LBS | TEMPERATURE: 97 F | RESPIRATION RATE: 20 BRPM | BODY MASS INDEX: 24 KG/M2

## 2022-04-26 DIAGNOSIS — C61 PROSTATE CANCER (HCC): ICD-10-CM

## 2022-04-26 PROCEDURE — 99213 OFFICE O/P EST LOW 20 MIN: CPT

## 2022-04-26 RX ORDER — LOSARTAN POTASSIUM 50 MG/1
50 TABLET ORAL DAILY
Qty: 90 TABLET | Refills: 0 | Status: SHIPPED | OUTPATIENT
Start: 2022-04-26

## 2022-04-26 RX ORDER — MONTELUKAST SODIUM 10 MG/1
TABLET ORAL
Qty: 30 TABLET | Refills: 0 | OUTPATIENT
Start: 2022-04-26

## 2022-04-26 NOTE — TELEPHONE ENCOUNTER
Hypertension Medications Protocol Passed 04/26/2022 09:42 AM   Protocol Details  CMP or BMP in past 12 months    Last serum creatinine< 2.0    Appointment in past 6 or next 3 months     LOV  2/22/22 dr estrada     LAST LAB 1/12/22     LAST RX  1/17/22 30 with 2     Next OV   Future Appointments   Date Time Provider Geoff Zamora   4/26/2022 11:30 AM Gwyn Gautam MD [address] [facility] RAD [address]   6/22/2022 12:00 PM Magalis Avalos MD [facility]         PROTOCOL pass

## 2022-04-26 NOTE — PATIENT INSTRUCTIONS
- WE WILL CALL TO SCHEDULE YOUR FOLLOW-UP APPOINTMENT WITH DR. SU IN DECEMBER 2022    - FOLLOW-UP WITH DR. Fredi Bajwa AFTER YOUR NEXT PSA BLOOD WORK IN 4 WEEKS    - CALL [phone] IF YOU HAVE ANY QUESTIONS/CONCERNS REGARDING RADIATION THERAPY

## 2022-06-01 ENCOUNTER — NURSE ONLY (OUTPATIENT)
Dept: HEMATOLOGY/ONCOLOGY | Facility: HOSPITAL | Age: 77
End: 2022-06-01
Attending: GENETIC COUNSELOR, MS
Payer: MEDICARE

## 2022-06-01 DIAGNOSIS — C61 PROSTATE CANCER (HCC): ICD-10-CM

## 2022-06-01 LAB — PSA SERPL-MCNC: 0.17 NG/ML (ref ?–4)

## 2022-06-01 PROCEDURE — 84153 ASSAY OF PSA TOTAL: CPT

## 2022-06-01 PROCEDURE — 36415 COLL VENOUS BLD VENIPUNCTURE: CPT

## 2022-06-02 ENCOUNTER — APPOINTMENT (OUTPATIENT)
Dept: HEMATOLOGY/ONCOLOGY | Facility: HOSPITAL | Age: 77
End: 2022-06-02
Attending: GENETIC COUNSELOR, MS
Payer: MEDICARE

## 2022-06-02 VITALS
HEIGHT: 65.98 IN | OXYGEN SATURATION: 91 % | TEMPERATURE: 97 F | WEIGHT: 157 LBS | HEART RATE: 94 BPM | RESPIRATION RATE: 16 BRPM | BODY MASS INDEX: 25.23 KG/M2 | DIASTOLIC BLOOD PRESSURE: 79 MMHG | SYSTOLIC BLOOD PRESSURE: 129 MMHG

## 2022-06-02 DIAGNOSIS — C61 PROSTATE CANCER (HCC): Primary | ICD-10-CM

## 2022-06-02 PROCEDURE — 99212 OFFICE O/P EST SF 10 MIN: CPT | Performed by: SPECIALIST

## 2022-06-16 LAB
ABSOLUTE BASOPHILS: 20 CELLS/UL (ref 0–200)
ABSOLUTE EOSINOPHILS: 70 CELLS/UL (ref 15–500)
ABSOLUTE LYMPHOCYTES: 1425 CELLS/UL (ref 850–3900)
ABSOLUTE MONOCYTES: 1020 CELLS/UL (ref 200–950)
ABSOLUTE NEUTROPHILS: 2465 CELLS/UL (ref 1500–7800)
ALBUMIN/GLOBULIN RATIO: 1.6 (CALC) (ref 1–2.5)
ALBUMIN: 4.1 G/DL (ref 3.6–5.1)
ALKALINE PHOSPHATASE: 66 U/L (ref 35–144)
ALT: 12 U/L (ref 9–46)
AST: 14 U/L (ref 10–35)
BASOPHILS: 0.4 %
BILIRUBIN, TOTAL: 0.8 MG/DL (ref 0.2–1.2)
BUN: 12 MG/DL (ref 7–25)
CALCIUM: 9.7 MG/DL (ref 8.6–10.3)
CARBON DIOXIDE: 30 MMOL/L (ref 20–32)
CHLORIDE: 105 MMOL/L (ref 98–110)
CHOL/HDLC RATIO: 2.3 (CALC)
CHOLESTEROL, TOTAL: 220 MG/DL
CREATININE: 0.79 MG/DL (ref 0.7–1.18)
EGFR IF AFRICN AM: 103 ML/MIN/1.73M2
EGFR IF NONAFRICN AM: 88 ML/MIN/1.73M2
EOSINOPHILS: 1.4 %
GLOBULIN: 2.5 G/DL (CALC) (ref 1.9–3.7)
GLUCOSE: 98 MG/DL (ref 65–99)
HDL CHOLESTEROL: 94 MG/DL
HEMATOCRIT: 45 % (ref 38.5–50)
HEMOGLOBIN: 14.7 G/DL (ref 13.2–17.1)
LDL-CHOLESTEROL: 109 MG/DL (CALC)
LYMPHOCYTES: 28.5 %
MCH: 32.7 PG (ref 27–33)
MCHC: 32.7 G/DL (ref 32–36)
MCV: 100.2 FL (ref 80–100)
MONOCYTES: 20.4 %
MPV: 8.9 FL (ref 7.5–12.5)
NEUTROPHILS: 49.3 %
NON-HDL CHOLESTEROL: 126 MG/DL (CALC)
PLATELET COUNT: 218 THOUSAND/UL (ref 140–400)
POTASSIUM: 4.5 MMOL/L (ref 3.5–5.3)
PROTEIN, TOTAL: 6.6 G/DL (ref 6.1–8.1)
RDW: 11.8 % (ref 11–15)
RED BLOOD CELL COUNT: 4.49 MILLION/UL (ref 4.2–5.8)
SODIUM: 141 MMOL/L (ref 135–146)
TRIGLYCERIDES: 83 MG/DL
TSH W/REFLEX TO FT4: 0.81 MIU/L (ref 0.4–4.5)
VITAMIN B12: 619 PG/ML (ref 200–1100)
WHITE BLOOD CELL COUNT: 5 THOUSAND/UL (ref 3.8–10.8)

## 2022-06-22 ENCOUNTER — OFFICE VISIT (OUTPATIENT)
Dept: FAMILY MEDICINE CLINIC | Facility: CLINIC | Age: 77
End: 2022-06-22
Payer: COMMERCIAL

## 2022-06-22 VITALS
HEIGHT: 69.98 IN | WEIGHT: 156 LBS | DIASTOLIC BLOOD PRESSURE: 66 MMHG | SYSTOLIC BLOOD PRESSURE: 120 MMHG | TEMPERATURE: 97 F | RESPIRATION RATE: 16 BRPM | OXYGEN SATURATION: 93 % | BODY MASS INDEX: 22.33 KG/M2 | HEART RATE: 89 BPM

## 2022-06-22 DIAGNOSIS — M47.896 OTHER OSTEOARTHRITIS OF SPINE, LUMBAR REGION: ICD-10-CM

## 2022-06-22 DIAGNOSIS — C79.51 BONE METASTASIS (HCC): ICD-10-CM

## 2022-06-22 DIAGNOSIS — H40.033 NARROW ANGLE OF ANTERIOR CHAMBER OF BOTH EYES: ICD-10-CM

## 2022-06-22 DIAGNOSIS — J43.2 CENTRILOBULAR EMPHYSEMA (HCC): ICD-10-CM

## 2022-06-22 DIAGNOSIS — I77.79 DISSECTION OF MESENTERIC ARTERY (HCC): ICD-10-CM

## 2022-06-22 DIAGNOSIS — E55.9 VITAMIN D DEFICIENCY: ICD-10-CM

## 2022-06-22 DIAGNOSIS — I10 ESSENTIAL HYPERTENSION: ICD-10-CM

## 2022-06-22 DIAGNOSIS — E03.9 ACQUIRED HYPOTHYROIDISM: ICD-10-CM

## 2022-06-22 DIAGNOSIS — D75.89 MACROCYTOSIS WITHOUT ANEMIA: ICD-10-CM

## 2022-06-22 DIAGNOSIS — H26.8 OTHER CATARACT OF BOTH EYES: ICD-10-CM

## 2022-06-22 DIAGNOSIS — D44.6 CAROTID BODY TUMOR (HCC): ICD-10-CM

## 2022-06-22 DIAGNOSIS — C67.9 UROTHELIAL CARCINOMA OF BLADDER (HCC): ICD-10-CM

## 2022-06-22 DIAGNOSIS — C61 PROSTATE CANCER (HCC): ICD-10-CM

## 2022-06-22 DIAGNOSIS — I70.0 ATHEROSCLEROSIS OF AORTA (HCC): ICD-10-CM

## 2022-06-22 DIAGNOSIS — H91.8X3 OTHER SPECIFIED HEARING LOSS OF BOTH EARS: ICD-10-CM

## 2022-06-22 DIAGNOSIS — R73.09 ELEVATED HEMOGLOBIN A1C: ICD-10-CM

## 2022-06-22 DIAGNOSIS — E78.49 OTHER HYPERLIPIDEMIA: ICD-10-CM

## 2022-06-22 DIAGNOSIS — Z00.00 ENCOUNTER FOR ANNUAL HEALTH EXAMINATION: Primary | ICD-10-CM

## 2022-06-22 PROCEDURE — G0439 PPPS, SUBSEQ VISIT: HCPCS | Performed by: FAMILY MEDICINE

## 2022-06-22 PROCEDURE — 3008F BODY MASS INDEX DOCD: CPT | Performed by: FAMILY MEDICINE

## 2022-06-22 PROCEDURE — 1126F AMNT PAIN NOTED NONE PRSNT: CPT | Performed by: FAMILY MEDICINE

## 2022-06-22 PROCEDURE — 3074F SYST BP LT 130 MM HG: CPT | Performed by: FAMILY MEDICINE

## 2022-06-22 PROCEDURE — 3078F DIAST BP <80 MM HG: CPT | Performed by: FAMILY MEDICINE

## 2022-06-22 PROCEDURE — 99397 PER PM REEVAL EST PAT 65+ YR: CPT | Performed by: FAMILY MEDICINE

## 2022-06-22 PROCEDURE — 96160 PT-FOCUSED HLTH RISK ASSMT: CPT | Performed by: FAMILY MEDICINE

## 2022-06-22 RX ORDER — LEVOTHYROXINE SODIUM 0.12 MG/1
125 TABLET ORAL EVERY MORNING
Qty: 90 TABLET | Refills: 1 | Status: SHIPPED | OUTPATIENT
Start: 2022-06-22

## 2022-06-22 RX ORDER — LOSARTAN POTASSIUM 50 MG/1
50 TABLET ORAL DAILY
Qty: 90 TABLET | Refills: 1 | Status: SHIPPED | OUTPATIENT
Start: 2022-06-22

## 2022-08-31 ENCOUNTER — NURSE ONLY (OUTPATIENT)
Dept: HEMATOLOGY/ONCOLOGY | Facility: HOSPITAL | Age: 77
End: 2022-08-31
Attending: GENETIC COUNSELOR, MS
Payer: MEDICARE

## 2022-08-31 DIAGNOSIS — C61 PROSTATE CANCER (HCC): ICD-10-CM

## 2022-08-31 LAB — PSA SERPL-MCNC: 0.03 NG/ML (ref ?–4)

## 2022-08-31 PROCEDURE — 36415 COLL VENOUS BLD VENIPUNCTURE: CPT

## 2022-08-31 PROCEDURE — 84153 ASSAY OF PSA TOTAL: CPT

## 2022-09-01 ENCOUNTER — OFFICE VISIT (OUTPATIENT)
Dept: HEMATOLOGY/ONCOLOGY | Facility: HOSPITAL | Age: 77
End: 2022-09-01
Attending: GENETIC COUNSELOR, MS
Payer: MEDICARE

## 2022-09-01 VITALS
TEMPERATURE: 97 F | WEIGHT: 154.19 LBS | DIASTOLIC BLOOD PRESSURE: 77 MMHG | SYSTOLIC BLOOD PRESSURE: 123 MMHG | HEART RATE: 86 BPM | BODY MASS INDEX: 22 KG/M2 | OXYGEN SATURATION: 91 % | RESPIRATION RATE: 16 BRPM

## 2022-09-01 DIAGNOSIS — C79.51 PROSTATE CANCER METASTATIC TO BONE (HCC): Primary | ICD-10-CM

## 2022-09-01 DIAGNOSIS — C61 PROSTATE CANCER METASTATIC TO BONE (HCC): Primary | ICD-10-CM

## 2022-09-01 DIAGNOSIS — C61 PROSTATE CANCER (HCC): ICD-10-CM

## 2022-09-01 PROCEDURE — 99211 OFF/OP EST MAY X REQ PHY/QHP: CPT

## 2022-09-01 NOTE — PROGRESS NOTES
Patient is here today for follow up with Dr. Johan Leonard  for Prostate cancer. Patient denies pain. Stated he is feeling good. Had PSA drawn yesterday. Medication list and medical history  were reviewed and updated. Education Record    Learner:  Patient      Disease / Diagnosis: Prostate Cancer    Barriers / Limitations:  None   Comments:    Method:  Brief focused, Discussion, Printed material and Reinforcement   Comments:    General Topics:  Medication, Pain, Procedure and Plan of care reviewed   Comments:    Outcome:  Shows understanding   Comments:    AVS provided and follow up reviewed. Patient instructed to call as needed.

## 2022-10-31 NOTE — TELEPHONE ENCOUNTER
LOV 6/22/2022    Future Appointments   Date Time Provider Geoff Zamora   11/30/2022 10:00 AM [address] [facility] [address] [facility]   12/1/2022 10:15 AM Mohini Connelly MD [address] [facility]   12/21/2022  2:00 PM Hero Metzger MD [facility]       albuterol (PROAIR HFA) 108 (90 Base) MCG/ACT Inhalation Aero Soln 18 g 0 1/17/2022

## 2022-11-01 RX ORDER — ALBUTEROL SULFATE 90 UG/1
AEROSOL, METERED RESPIRATORY (INHALATION)
Qty: 8.5 G | Refills: 0 | Status: SHIPPED | OUTPATIENT
Start: 2022-11-01

## 2022-11-30 ENCOUNTER — NURSE ONLY (OUTPATIENT)
Dept: HEMATOLOGY/ONCOLOGY | Facility: HOSPITAL | Age: 77
End: 2022-11-30
Attending: GENETIC COUNSELOR, MS
Payer: MEDICARE

## 2022-11-30 DIAGNOSIS — C61 PROSTATE CANCER (HCC): ICD-10-CM

## 2022-11-30 LAB — PSA SERPL-MCNC: 0.02 NG/ML (ref ?–4)

## 2022-11-30 PROCEDURE — 84153 ASSAY OF PSA TOTAL: CPT

## 2022-11-30 PROCEDURE — 36415 COLL VENOUS BLD VENIPUNCTURE: CPT

## 2022-12-01 ENCOUNTER — OFFICE VISIT (OUTPATIENT)
Dept: HEMATOLOGY/ONCOLOGY | Facility: HOSPITAL | Age: 77
End: 2022-12-01
Attending: GENETIC COUNSELOR, MS
Payer: MEDICARE

## 2022-12-01 VITALS
WEIGHT: 161 LBS | DIASTOLIC BLOOD PRESSURE: 77 MMHG | TEMPERATURE: 97 F | RESPIRATION RATE: 16 BRPM | SYSTOLIC BLOOD PRESSURE: 123 MMHG | HEART RATE: 94 BPM | OXYGEN SATURATION: 95 % | BODY MASS INDEX: 23 KG/M2

## 2022-12-01 DIAGNOSIS — C61 PROSTATE CANCER (HCC): ICD-10-CM

## 2022-12-01 PROCEDURE — 99212 OFFICE O/P EST SF 10 MIN: CPT | Performed by: SPECIALIST

## 2022-12-01 NOTE — PROGRESS NOTES
Patient is here today for follow up with Dr. Horacio Cornejo  for Prostate Cancer. Patient denies pain. Stated he is feeling good. Medication list and medical history were reviewed and updated. Education Record    Learner:  Patient      Disease / Diagnosis: Prostate cancer    Barriers / Limitations:  None   Comments:    Method:  Brief focused, Discussion, Printed material and Reinforcement   Comments:    General Topics:  Medication, Pain, Procedure and Plan of care reviewed   Comments:    Outcome:  Shows understanding   Comments:    AVS provided and follow up reviewed. Patient instructed to call as needed.

## 2022-12-20 ENCOUNTER — TELEPHONE (OUTPATIENT)
Dept: FAMILY MEDICINE CLINIC | Facility: CLINIC | Age: 77
End: 2022-12-20

## 2022-12-20 DIAGNOSIS — E03.9 ACQUIRED HYPOTHYROIDISM: ICD-10-CM

## 2022-12-20 DIAGNOSIS — I10 ESSENTIAL HYPERTENSION: ICD-10-CM

## 2022-12-20 DIAGNOSIS — E78.49 OTHER HYPERLIPIDEMIA: Primary | ICD-10-CM

## 2022-12-20 DIAGNOSIS — Z51.81 ENCOUNTER FOR MEDICATION MONITORING: ICD-10-CM

## 2022-12-20 NOTE — TELEPHONE ENCOUNTER
Please advise. Labs pended and routed to PCP for review.     Last labs:6/15/22  Vitamin B12  TSH  LIPID  COMP  CBC    Last office visit: 6/22/22

## 2022-12-20 NOTE — TELEPHONE ENCOUNTER
Pt. Elsy Almodovar in to reschedule appt. Would like labs completed before visit but no orders placed. Pt. Asking for labs to be entered for [facility]. 70 y/o F with PMHx of Vivar's, GERD, hiatal hernia, shingles (7 years ago), cancer (10 years ago), presents to the ED, c/o pain all over, onset of a week ago. She has been getting crushing pain at the chest. Shingles like pain that radiates to the back, lower back and shoulders, it is a burning pain. Abdominal pain that feels like she "ate a five course meal", fullness feeling. The last two days the abdominal pain has increased in severity. Has not been tolerating anything PO, she is nauseous and has been vomiting. States that chest pain is everyday. She has a murmur and had an ablation and a stimulator in her neck.   Abdomen is tender. Shingles like rash on back.   LBM: yesterday  Cardio: Dr. Guzman

## 2022-12-27 ENCOUNTER — HOSPITAL ENCOUNTER (OUTPATIENT)
Dept: RADIATION ONCOLOGY | Facility: HOSPITAL | Age: 77
Discharge: HOME | End: 2022-12-27
Attending: INTERNAL MEDICINE
Payer: MEDICARE

## 2022-12-27 VITALS
BODY MASS INDEX: 23 KG/M2 | DIASTOLIC BLOOD PRESSURE: 82 MMHG | TEMPERATURE: 97 F | WEIGHT: 158.19 LBS | SYSTOLIC BLOOD PRESSURE: 123 MMHG | OXYGEN SATURATION: 94 % | RESPIRATION RATE: 18 BRPM

## 2022-12-27 DIAGNOSIS — C61 PROSTATE CANCER (HCC): Primary | ICD-10-CM

## 2022-12-27 PROCEDURE — 99213 OFFICE O/P EST LOW 20 MIN: CPT

## 2022-12-27 NOTE — PATIENT INSTRUCTIONS
-FOLLOW UP WITH DR. SU ONLY IF NEEDED    - CALL [phone] IF YOU HAVE ANY QUESTIONS/CONCERNS REGARDING RADIATION THERAPY

## 2023-01-13 LAB
ABSOLUTE BASOPHILS: 9 CELLS/UL (ref 0–200)
ABSOLUTE EOSINOPHILS: 62 CELLS/UL (ref 15–500)
ABSOLUTE LYMPHOCYTES: 1377 CELLS/UL (ref 850–3900)
ABSOLUTE MONOCYTES: 937 CELLS/UL (ref 200–950)
ABSOLUTE NEUTROPHILS: 2015 CELLS/UL (ref 1500–7800)
ALBUMIN/GLOBULIN RATIO: 2 (CALC) (ref 1–2.5)
ALBUMIN: 4.4 G/DL (ref 3.6–5.1)
ALKALINE PHOSPHATASE: 85 U/L (ref 35–144)
ALT: 13 U/L (ref 9–46)
AST: 14 U/L (ref 10–35)
BASOPHILS: 0.2 %
BILIRUBIN, TOTAL: 0.9 MG/DL (ref 0.2–1.2)
BUN: 13 MG/DL (ref 7–25)
CALCIUM: 10 MG/DL (ref 8.6–10.3)
CARBON DIOXIDE: 31 MMOL/L (ref 20–32)
CHLORIDE: 104 MMOL/L (ref 98–110)
CHOL/HDLC RATIO: 2.5 (CALC)
CHOLESTEROL, TOTAL: 216 MG/DL
CREATININE: 0.81 MG/DL (ref 0.7–1.28)
EGFR: 92 ML/MIN/1.73M2
EOSINOPHILS: 1.4 %
GLOBULIN: 2.2 G/DL (CALC) (ref 1.9–3.7)
GLUCOSE: 101 MG/DL (ref 65–99)
HDL CHOLESTEROL: 85 MG/DL
HEMATOCRIT: 45 % (ref 38.5–50)
HEMOGLOBIN: 15.7 G/DL (ref 13.2–17.1)
LDL-CHOLESTEROL: 115 MG/DL (CALC)
LYMPHOCYTES: 31.3 %
MCH: 34.2 PG (ref 27–33)
MCHC: 34.9 G/DL (ref 32–36)
MCV: 98 FL (ref 80–100)
MONOCYTES: 21.3 %
MPV: 9.3 FL (ref 7.5–12.5)
NEUTROPHILS: 45.8 %
NON-HDL CHOLESTEROL: 131 MG/DL (CALC)
PLATELET COUNT: 207 THOUSAND/UL (ref 140–400)
POTASSIUM: 4.7 MMOL/L (ref 3.5–5.3)
PROTEIN, TOTAL: 6.6 G/DL (ref 6.1–8.1)
RDW: 11.8 % (ref 11–15)
RED BLOOD CELL COUNT: 4.59 MILLION/UL (ref 4.2–5.8)
SODIUM: 141 MMOL/L (ref 135–146)
TRIGLYCERIDES: 66 MG/DL
TSH W/REFLEX TO FT4: 1.32 MIU/L (ref 0.4–4.5)
WHITE BLOOD CELL COUNT: 4.4 THOUSAND/UL (ref 3.8–10.8)

## 2023-01-17 ENCOUNTER — OFFICE VISIT (OUTPATIENT)
Dept: FAMILY MEDICINE CLINIC | Facility: CLINIC | Age: 78
End: 2023-01-17
Payer: COMMERCIAL

## 2023-01-17 ENCOUNTER — HOSPITAL ENCOUNTER (OUTPATIENT)
Dept: GENERAL RADIOLOGY | Age: 78
Discharge: HOME | End: 2023-01-17
Attending: FAMILY MEDICINE
Payer: MEDICARE

## 2023-01-17 VITALS
HEART RATE: 80 BPM | SYSTOLIC BLOOD PRESSURE: 124 MMHG | HEIGHT: 65.75 IN | TEMPERATURE: 98 F | BODY MASS INDEX: 25.74 KG/M2 | OXYGEN SATURATION: 97 % | RESPIRATION RATE: 16 BRPM | WEIGHT: 158.25 LBS | DIASTOLIC BLOOD PRESSURE: 70 MMHG

## 2023-01-17 DIAGNOSIS — J43.2 CENTRILOBULAR EMPHYSEMA (HCC): ICD-10-CM

## 2023-01-17 DIAGNOSIS — C79.51 PROSTATE CANCER METASTATIC TO BONE (HCC): ICD-10-CM

## 2023-01-17 DIAGNOSIS — Z51.81 ENCOUNTER FOR MEDICATION MONITORING: ICD-10-CM

## 2023-01-17 DIAGNOSIS — R05.3 CHRONIC COUGH: ICD-10-CM

## 2023-01-17 DIAGNOSIS — I77.79 DISSECTION OF MESENTERIC ARTERY (HCC): ICD-10-CM

## 2023-01-17 DIAGNOSIS — Z00.00 ENCOUNTER FOR ANNUAL HEALTH EXAMINATION: Primary | ICD-10-CM

## 2023-01-17 DIAGNOSIS — R73.01 IFG (IMPAIRED FASTING GLUCOSE): ICD-10-CM

## 2023-01-17 DIAGNOSIS — E03.9 ACQUIRED HYPOTHYROIDISM: ICD-10-CM

## 2023-01-17 DIAGNOSIS — D44.6 CAROTID BODY TUMOR (HCC): ICD-10-CM

## 2023-01-17 DIAGNOSIS — I70.0 ATHEROSCLEROSIS OF AORTA (HCC): ICD-10-CM

## 2023-01-17 DIAGNOSIS — C61 PROSTATE CANCER METASTATIC TO BONE (HCC): ICD-10-CM

## 2023-01-17 DIAGNOSIS — H40.033 NARROW ANGLE OF ANTERIOR CHAMBER OF BOTH EYES: ICD-10-CM

## 2023-01-17 DIAGNOSIS — E78.49 OTHER HYPERLIPIDEMIA: ICD-10-CM

## 2023-01-17 DIAGNOSIS — H91.8X3 OTHER SPECIFIED HEARING LOSS OF BOTH EARS: ICD-10-CM

## 2023-01-17 DIAGNOSIS — C67.9 UROTHELIAL CARCINOMA OF BLADDER (HCC): ICD-10-CM

## 2023-01-17 DIAGNOSIS — I10 ESSENTIAL HYPERTENSION: ICD-10-CM

## 2023-01-17 PROCEDURE — 99397 PER PM REEVAL EST PAT 65+ YR: CPT | Performed by: FAMILY MEDICINE

## 2023-01-17 PROCEDURE — G0439 PPPS, SUBSEQ VISIT: HCPCS | Performed by: FAMILY MEDICINE

## 2023-01-17 PROCEDURE — 71046 X-RAY EXAM CHEST 2 VIEWS: CPT | Performed by: FAMILY MEDICINE

## 2023-01-17 PROCEDURE — 3008F BODY MASS INDEX DOCD: CPT | Performed by: FAMILY MEDICINE

## 2023-01-17 PROCEDURE — 3074F SYST BP LT 130 MM HG: CPT | Performed by: FAMILY MEDICINE

## 2023-01-17 PROCEDURE — 1126F AMNT PAIN NOTED NONE PRSNT: CPT | Performed by: FAMILY MEDICINE

## 2023-01-17 PROCEDURE — 96160 PT-FOCUSED HLTH RISK ASSMT: CPT | Performed by: FAMILY MEDICINE

## 2023-01-17 PROCEDURE — 3078F DIAST BP <80 MM HG: CPT | Performed by: FAMILY MEDICINE

## 2023-01-17 RX ORDER — ATORVASTATIN CALCIUM 10 MG/1
10 TABLET, FILM COATED ORAL NIGHTLY
Qty: 30 TABLET | Refills: 2 | Status: SHIPPED | OUTPATIENT
Start: 2023-01-17

## 2023-01-17 NOTE — PATIENT INSTRUCTIONS
Baby Aspirin 81mg daily with food    Samuel Romero's SCREENING SCHEDULE   Tests on this list are recommended by your physician but may not be covered, or covered at this frequency, by your insurer. Please check with your insurance carrier before scheduling to verify coverage.    PREVENTATIVE SERVICES FREQUENCY &  COVERAGE DETAILS LAST COMPLETION DATE   Diabetes Screening    Fasting Blood Sugar / Glucose    One screening every 12 months if never tested or if previously tested but not diagnosed with pre-diabetes   One screening every 6 months if diagnosed with pre-diabetes Lab Results   Component Value Date     (H) 01/12/2023        Cardiovascular Disease Screening    Lipid Panel  Cholesterol  Lipoprotein (HDL)  Triglycerides Covered every 5 years for all Medicare beneficiaries without apparent signs or symptoms of cardiovascular disease Lab Results   Component Value Date    CHOLEST 216 (H) 01/12/2023    HDL 85 01/12/2023     (H) 01/12/2023    TRIG 66 01/12/2023         Electrocardiogram (EKG)   Covered if needed at Welcome to Medicare, and non-screening if indicated for medical reasons 06/19/2021      Ultrasound Screening for Abdominal Aortic Aneurysm (AAA) Covered once in a lifetime for one of the following risk factors    Men who are 73-68 years old and have ever smoked    Anyone with a family history -     Colorectal Cancer Screening  Covered for ages 52-80; only need ONE of the following:    Colonoscopy   Covered every 10 years    Covered every 2 years if patient is at high risk or previous colonoscopy was abnormal 06/06/2016    Colorectal Cancer Screening due on 06/06/2026    Flexible Sigmoidoscopy   Covered every 4 years -    Fecal Occult Blood Test Covered annually -   Prostate Cancer Screening    Prostate-Specific Antigen (PSA) Annually Lab Results   Component Value Date    PSA 0.02 11/30/2022     PSA due on 11/30/2024   Immunizations    Influenza Covered once per flu season  Please get every year -  No recommendations at this time    Pneumococcal Each vaccine (Mfmqayk16 & Subeyrgck32) covered once after 65 Prevnar 13: 05/17/2016    Hfqikygxf30: 05/26/2017     No recommendations at this time    Hepatitis B One screening covered for patients with certain risk factors   -  No recommendations at this time    Tetanus Toxoid Not covered by Medicare Part B unless medically necessary (cut with metal); may be covered with your pharmacy prescription benefits -    Tetanus, Diptheria and Pertusis TD and TDaP Not covered by Medicare Part B -  No recommendations at this time    Zoster Not covered by Medicare Part B; may be covered with your pharmacy  prescription benefits 07/02/2017  Zoster Vaccines(1 of 2) due on 08/27/2017       Annual Monitoring of Persistent Medications (ACE/ARB, digoxin diuretics, anticonvulsants)    Potassium Annually Lab Results   Component Value Date    K 4.7 01/12/2023         Creatinine   Annually Lab Results   Component Value Date    CREATSERUM 0.81 01/12/2023         BUN Annually Lab Results   Component Value Date    BUN 13 01/12/2023       Drug Serum Conc Annually No results found for: DIGOXIN, DIG, VALP           Chronic Obstructive Pulmonary Disease (COPD)    Spirometry Annually Spirometry date: 12/03/2016

## 2023-01-31 RX ORDER — LEVOTHYROXINE SODIUM 0.12 MG/1
TABLET ORAL
Qty: 90 TABLET | Refills: 1 | Status: SHIPPED | OUTPATIENT
Start: 2023-01-31

## 2023-01-31 NOTE — TELEPHONE ENCOUNTER
Thyroid Supplements Protocol Passed 01/30/2023 07:29 PM   Protocol Details  TSH test in past 12 months    TSH value between 0.350 and 5.500 IU/ml    Appointment in past 12 or next 3 months     LOV 1/17/23 dr estrada     LAST LAB  1/12/23     LAST RX 6/22/22 90 with 1     Next OV   Future Appointments   Date Time Provider Geoff Zamora   3/2/2023  9:00 AM [facility]   3/3/2023 10:00 AM Fadi Graves MD [address] [facility]   3/24/2023  9:00 AM Cecilia Werner MD [facility]         PROTOCOL  Pass

## 2023-02-11 PROBLEM — R31.0 GROSS HEMATURIA: Status: RESOLVED | Noted: 2020-12-20 | Resolved: 2023-02-11

## 2023-02-28 RX ORDER — LOSARTAN POTASSIUM 50 MG/1
TABLET ORAL
Qty: 90 TABLET | Refills: 0 | Status: SHIPPED | OUTPATIENT
Start: 2023-02-28

## 2023-02-28 NOTE — TELEPHONE ENCOUNTER
Hypertension Medications Protocol Passed 02/28/2023 10:02 AM   Protocol Details  CMP or BMP in past 12 months    Last serum creatinine< 2.0    Appointment in past 6 or next 3 months        6/22/22 90 with 1   Future Appointments   Date Time Provider Geoff Kuhni   3/2/2023  9:00 AM [address]   3/3/2023 10:00 AM Rahul Ramos MD [address] [employer]   3/24/2023  9:00 AM Valdo Amado MD [facility]

## 2023-03-02 ENCOUNTER — NURSE ONLY (OUTPATIENT)
Dept: HEMATOLOGY/ONCOLOGY | Facility: HOSPITAL | Age: 78
End: 2023-03-02
Attending: GENETIC COUNSELOR, MS
Payer: MEDICARE

## 2023-03-02 DIAGNOSIS — C61 PROSTATE CANCER (HCC): ICD-10-CM

## 2023-03-02 LAB — PSA SERPL-MCNC: 0.12 NG/ML (ref ?–4)

## 2023-03-02 PROCEDURE — 36415 COLL VENOUS BLD VENIPUNCTURE: CPT

## 2023-03-02 PROCEDURE — 84153 ASSAY OF PSA TOTAL: CPT

## 2023-03-03 ENCOUNTER — OFFICE VISIT (OUTPATIENT)
Dept: HEMATOLOGY/ONCOLOGY | Facility: HOSPITAL | Age: 78
End: 2023-03-03
Attending: GENETIC COUNSELOR, MS
Payer: MEDICARE

## 2023-03-03 VITALS
SYSTOLIC BLOOD PRESSURE: 127 MMHG | OXYGEN SATURATION: 94 % | RESPIRATION RATE: 16 BRPM | HEIGHT: 65.98 IN | BODY MASS INDEX: 25.55 KG/M2 | TEMPERATURE: 97 F | DIASTOLIC BLOOD PRESSURE: 82 MMHG | WEIGHT: 159 LBS | HEART RATE: 94 BPM

## 2023-03-03 DIAGNOSIS — C79.51 PROSTATE CANCER METASTATIC TO BONE (HCC): ICD-10-CM

## 2023-03-03 DIAGNOSIS — R97.20 RISING PSA LEVEL: ICD-10-CM

## 2023-03-03 DIAGNOSIS — C61 PROSTATE CANCER (HCC): Primary | ICD-10-CM

## 2023-03-03 DIAGNOSIS — Z85.46 HISTORY OF PROSTATE CANCER: ICD-10-CM

## 2023-03-03 DIAGNOSIS — C61 PROSTATE CANCER METASTATIC TO BONE (HCC): ICD-10-CM

## 2023-03-03 PROCEDURE — 99214 OFFICE O/P EST MOD 30 MIN: CPT | Performed by: SPECIALIST

## 2023-03-03 NOTE — PROGRESS NOTES
Patient is here today for follow up with Dharmesh Yanez for Prostate Cancer. Patient denies pain. Stated he is feeling good. Medication list and medical history were reviewed and updated. Education Record    Learner:  Patient and spouse    Disease / Diagnosis: Prostate Cancer / Macrocytosis    Barriers / Limitations:  None   Comments:    Method:  Brief focused, Discussion, Printed material and Reinforcement   Comments:    General Topics:  Medication, Pain, Procedure and Plan of care reviewed   Comments:    Outcome:  Shows understanding   Comments:      AVS provided and follow up reviewed. Patient instructed to call as needed.

## 2023-03-22 LAB
ALBUMIN/GLOBULIN RATIO: 2 (CALC) (ref 1–2.5)
ALBUMIN: 4.3 G/DL (ref 3.6–5.1)
ALKALINE PHOSPHATASE: 71 U/L (ref 35–144)
ALT: 13 U/L (ref 9–46)
AST: 13 U/L (ref 10–35)
BILIRUBIN, TOTAL: 0.8 MG/DL (ref 0.2–1.2)
BUN/CREATININE RATIO: 22 (CALC) (ref 6–22)
BUN: 15 MG/DL (ref 7–25)
CALCIUM: 9.7 MG/DL (ref 8.6–10.3)
CARBON DIOXIDE: 30 MMOL/L (ref 20–32)
CHLORIDE: 106 MMOL/L (ref 98–110)
CHOL/HDLC RATIO: 2.3 (CALC)
CHOLESTEROL, TOTAL: 181 MG/DL
CREATININE: 0.69 MG/DL (ref 0.7–1.28)
EGFR: 97 ML/MIN/1.73M2
GLOBULIN: 2.2 G/DL (CALC) (ref 1.9–3.7)
GLUCOSE: 105 MG/DL (ref 65–99)
HDL CHOLESTEROL: 79 MG/DL
HEMOGLOBIN A1C: 5.6 % OF TOTAL HGB
LDL-CHOLESTEROL: 88 MG/DL (CALC)
NON-HDL CHOLESTEROL: 102 MG/DL (CALC)
POTASSIUM: 4.6 MMOL/L (ref 3.5–5.3)
PROTEIN, TOTAL: 6.5 G/DL (ref 6.1–8.1)
SODIUM: 142 MMOL/L (ref 135–146)
TRIGLYCERIDES: 57 MG/DL

## 2023-03-24 ENCOUNTER — OFFICE VISIT (OUTPATIENT)
Dept: FAMILY MEDICINE CLINIC | Facility: CLINIC | Age: 78
End: 2023-03-24
Payer: COMMERCIAL

## 2023-03-24 VITALS
BODY MASS INDEX: 25.86 KG/M2 | HEIGHT: 65.9 IN | SYSTOLIC BLOOD PRESSURE: 112 MMHG | HEART RATE: 76 BPM | WEIGHT: 159 LBS | DIASTOLIC BLOOD PRESSURE: 66 MMHG | RESPIRATION RATE: 16 BRPM | TEMPERATURE: 97 F | OXYGEN SATURATION: 94 %

## 2023-03-24 DIAGNOSIS — E78.49 OTHER HYPERLIPIDEMIA: ICD-10-CM

## 2023-03-24 DIAGNOSIS — I10 ESSENTIAL HYPERTENSION: Primary | ICD-10-CM

## 2023-03-24 DIAGNOSIS — C61 PROSTATE CANCER (HCC): ICD-10-CM

## 2023-03-24 DIAGNOSIS — E03.9 ACQUIRED HYPOTHYROIDISM: ICD-10-CM

## 2023-03-24 DIAGNOSIS — J43.2 CENTRILOBULAR EMPHYSEMA (HCC): ICD-10-CM

## 2023-03-24 DIAGNOSIS — Z51.81 ENCOUNTER FOR MEDICATION MONITORING: ICD-10-CM

## 2023-03-24 DIAGNOSIS — K55.1 SUPERIOR MESENTERIC ARTERY STENOSIS (HCC): ICD-10-CM

## 2023-03-24 DIAGNOSIS — Z86.03 HISTORY OF CAROTID BODY TUMOR: ICD-10-CM

## 2023-03-24 DIAGNOSIS — R73.01 IFG (IMPAIRED FASTING GLUCOSE): ICD-10-CM

## 2023-03-24 PROCEDURE — 99214 OFFICE O/P EST MOD 30 MIN: CPT | Performed by: FAMILY MEDICINE

## 2023-03-24 PROCEDURE — 3078F DIAST BP <80 MM HG: CPT | Performed by: FAMILY MEDICINE

## 2023-03-24 PROCEDURE — 3008F BODY MASS INDEX DOCD: CPT | Performed by: FAMILY MEDICINE

## 2023-03-24 PROCEDURE — 1170F FXNL STATUS ASSESSED: CPT | Performed by: FAMILY MEDICINE

## 2023-03-24 PROCEDURE — 3074F SYST BP LT 130 MM HG: CPT | Performed by: FAMILY MEDICINE

## 2023-03-24 PROCEDURE — 1159F MED LIST DOCD IN RCRD: CPT | Performed by: FAMILY MEDICINE

## 2023-03-24 PROCEDURE — 1160F RVW MEDS BY RX/DR IN RCRD: CPT | Performed by: FAMILY MEDICINE

## 2023-03-24 RX ORDER — LOSARTAN POTASSIUM 50 MG/1
50 TABLET ORAL DAILY
Qty: 90 TABLET | Refills: 0 | Status: SHIPPED | OUTPATIENT
Start: 2023-03-24

## 2023-03-24 RX ORDER — ATORVASTATIN CALCIUM 10 MG/1
10 TABLET, FILM COATED ORAL NIGHTLY
Qty: 30 TABLET | Refills: 2 | Status: SHIPPED | OUTPATIENT
Start: 2023-03-24

## 2023-05-03 ENCOUNTER — NURSE ONLY (OUTPATIENT)
Dept: HEMATOLOGY/ONCOLOGY | Facility: HOSPITAL | Age: 78
End: 2023-05-03
Attending: GENETIC COUNSELOR, MS
Payer: MEDICARE

## 2023-05-03 DIAGNOSIS — Z85.46 HISTORY OF PROSTATE CANCER: ICD-10-CM

## 2023-05-03 DIAGNOSIS — C61 PROSTATE CANCER METASTATIC TO BONE (HCC): ICD-10-CM

## 2023-05-03 DIAGNOSIS — C79.51 PROSTATE CANCER METASTATIC TO BONE (HCC): ICD-10-CM

## 2023-05-03 DIAGNOSIS — R97.20 RISING PSA LEVEL: ICD-10-CM

## 2023-05-03 DIAGNOSIS — C61 PROSTATE CANCER (HCC): ICD-10-CM

## 2023-05-03 LAB
ALBUMIN SERPL-MCNC: 3.9 G/DL (ref 3.4–5)
ALBUMIN/GLOB SERPL: 1.1 {RATIO} (ref 1–2)
ALP LIVER SERPL-CCNC: 75 U/L
ALT SERPL-CCNC: 24 U/L
ANION GAP SERPL CALC-SCNC: 0 MMOL/L (ref 0–18)
AST SERPL-CCNC: 18 U/L (ref 15–37)
BASOPHILS # BLD AUTO: 0.01 X10(3) UL (ref 0–0.2)
BASOPHILS NFR BLD AUTO: 0.2 %
BILIRUB SERPL-MCNC: 0.8 MG/DL (ref 0.1–2)
BUN BLD-MCNC: 15 MG/DL (ref 7–18)
CALCIUM BLD-MCNC: 9.3 MG/DL (ref 8.5–10.1)
CHLORIDE SERPL-SCNC: 112 MMOL/L (ref 98–112)
CO2 SERPL-SCNC: 26 MMOL/L (ref 21–32)
CREAT BLD-MCNC: 0.8 MG/DL
EOSINOPHIL # BLD AUTO: 0.05 X10(3) UL (ref 0–0.7)
EOSINOPHIL NFR BLD AUTO: 1.1 %
ERYTHROCYTE [DISTWIDTH] IN BLOOD BY AUTOMATED COUNT: 12.8 %
FASTING STATUS PATIENT QL REPORTED: NO
GFR SERPLBLD BASED ON 1.73 SQ M-ARVRAT: 92 ML/MIN/1.73M2 (ref 60–?)
GLOBULIN PLAS-MCNC: 3.5 G/DL (ref 2.8–4.4)
GLUCOSE BLD-MCNC: 108 MG/DL (ref 70–99)
HCT VFR BLD AUTO: 45.7 %
HGB BLD-MCNC: 15.1 G/DL
IMM GRANULOCYTES # BLD AUTO: 0.06 X10(3) UL (ref 0–1)
IMM GRANULOCYTES NFR BLD: 1.3 %
LYMPHOCYTES # BLD AUTO: 1.15 X10(3) UL (ref 1–4)
LYMPHOCYTES NFR BLD AUTO: 24.5 %
MCH RBC QN AUTO: 33.4 PG (ref 26–34)
MCHC RBC AUTO-ENTMCNC: 33 G/DL (ref 31–37)
MCV RBC AUTO: 101.1 FL
MONOCYTES # BLD AUTO: 1.03 X10(3) UL (ref 0.1–1)
MONOCYTES NFR BLD AUTO: 22 %
NEUTROPHILS # BLD AUTO: 2.39 X10 (3) UL (ref 1.5–7.7)
NEUTROPHILS # BLD AUTO: 2.39 X10(3) UL (ref 1.5–7.7)
NEUTROPHILS NFR BLD AUTO: 50.9 %
OSMOLALITY SERPL CALC.SUM OF ELEC: 287 MOSM/KG (ref 275–295)
PLATELET # BLD AUTO: 194 10(3)UL (ref 150–450)
POTASSIUM SERPL-SCNC: 4.4 MMOL/L (ref 3.5–5.1)
PROT SERPL-MCNC: 7.4 G/DL (ref 6.4–8.2)
PSA SERPL-MCNC: 0.53 NG/ML (ref ?–4)
RBC # BLD AUTO: 4.52 X10(6)UL
SODIUM SERPL-SCNC: 138 MMOL/L (ref 136–145)
WBC # BLD AUTO: 4.7 X10(3) UL (ref 4–11)

## 2023-05-03 PROCEDURE — 84153 ASSAY OF PSA TOTAL: CPT

## 2023-05-03 PROCEDURE — 36415 COLL VENOUS BLD VENIPUNCTURE: CPT

## 2023-05-03 PROCEDURE — 85025 COMPLETE CBC W/AUTO DIFF WBC: CPT

## 2023-05-03 PROCEDURE — 80053 COMPREHEN METABOLIC PANEL: CPT

## 2023-05-05 ENCOUNTER — OFFICE VISIT (OUTPATIENT)
Dept: HEMATOLOGY/ONCOLOGY | Facility: HOSPITAL | Age: 78
End: 2023-05-05
Attending: GENETIC COUNSELOR, MS
Payer: MEDICARE

## 2023-05-05 VITALS
SYSTOLIC BLOOD PRESSURE: 129 MMHG | OXYGEN SATURATION: 93 % | TEMPERATURE: 97 F | HEART RATE: 74 BPM | DIASTOLIC BLOOD PRESSURE: 86 MMHG | HEIGHT: 65.98 IN | WEIGHT: 157.5 LBS | BODY MASS INDEX: 25.31 KG/M2 | RESPIRATION RATE: 18 BRPM

## 2023-05-05 DIAGNOSIS — C79.51 PROSTATE CANCER METASTATIC TO BONE (HCC): ICD-10-CM

## 2023-05-05 DIAGNOSIS — C61 PROSTATE CANCER (HCC): Primary | ICD-10-CM

## 2023-05-05 DIAGNOSIS — C61 PROSTATE CANCER METASTATIC TO BONE (HCC): ICD-10-CM

## 2023-05-05 DIAGNOSIS — R97.20 RISING PSA LEVEL: ICD-10-CM

## 2023-05-05 PROCEDURE — 99214 OFFICE O/P EST MOD 30 MIN: CPT | Performed by: SPECIALIST

## 2023-05-05 NOTE — PROGRESS NOTES
Patient is here today for follow up with Aditya Atkins for Prostate Cancer. Test Results. Patient denies pain. Medication list and medical history were reviewed and updated. Education Record    Learner:  Patient and spouse    Disease / Diagnosis: Prostate Cancer    Barriers / Limitations:  None   Comments:    Method:  Brief focused, Discussion, Printed material and Reinforcement   Comments:    General Topics:  Medication, Pain, Procedure and Plan of care reviewed   Comments:    Outcome:  Shows understanding   Comments:    Lashonda Christian MD visit. PET scan order given. AVS provided and follow up reviewed. Patient instructed to call as needed.

## 2023-05-22 RX ORDER — ALBUTEROL SULFATE 90 UG/1
AEROSOL, METERED RESPIRATORY (INHALATION)
Qty: 18 G | Refills: 1 | Status: SHIPPED | OUTPATIENT
Start: 2023-05-22

## 2023-05-22 NOTE — TELEPHONE ENCOUNTER
Asthma & COPD Medication Protocol Failed 05/22/2023 03:28 PM    Asthma Action Score greater than or equal to 20    AAP/ACT given in last 12 months    Appointment in past 6 or next 3 months         lov 3/24/2023    Future Appointments   Date Time Provider Geoff Zamora   6/1/2023  1:00 PM [facility] PET DOSE [address] 6/1/2023  2:15 PM [facility] PET SCANNER [address] 7/12/2023 12:00 PM Priyanka Mathew MD [facility] [address] [facility] [address]

## 2023-06-01 ENCOUNTER — HOSPITAL ENCOUNTER (OUTPATIENT)
Dept: NUCLEAR MEDICINE | Facility: HOSPITAL | Age: 78
Discharge: HOME | End: 2023-06-01
Attending: SPECIALIST
Payer: MEDICARE

## 2023-06-01 DIAGNOSIS — C79.51 PROSTATE CANCER METASTATIC TO BONE (HCC): ICD-10-CM

## 2023-06-01 DIAGNOSIS — C61 PROSTATE CANCER (HCC): ICD-10-CM

## 2023-06-01 DIAGNOSIS — R97.20 RISING PSA LEVEL: ICD-10-CM

## 2023-06-01 DIAGNOSIS — C61 PROSTATE CANCER METASTATIC TO BONE (HCC): ICD-10-CM

## 2023-06-01 PROCEDURE — 78815 PET IMAGE W/CT SKULL-THIGH: CPT | Performed by: SPECIALIST

## 2023-06-19 ENCOUNTER — TELEPHONE (OUTPATIENT)
Dept: FAMILY MEDICINE CLINIC | Facility: CLINIC | Age: 78
End: 2023-06-19

## 2023-06-19 NOTE — TELEPHONE ENCOUNTER
Reached patient for medication adherence consult. Patient overdue for refill on atorvastatin per insurance report. Patient reports that he has been taking his atorvastatin as prescribed with an occasional missed dose. Patient states that he has remaining medication that has accumulated over time and does not need a refill at this time. He reports tolerating the medication well. Discussed that patient can take atorvastatin in the morning with his other medications to help with adherence; patient states he will think about it. Provided education on importance of adherence for optimal benefit. Patient denies any questions or concerns with medication.

## 2023-06-20 ENCOUNTER — HOSPITAL ENCOUNTER (OUTPATIENT)
Dept: RADIATION ONCOLOGY | Facility: HOSPITAL | Age: 78
Discharge: HOME | End: 2023-06-20
Attending: INTERNAL MEDICINE
Payer: MEDICARE

## 2023-06-20 VITALS
RESPIRATION RATE: 20 BRPM | DIASTOLIC BLOOD PRESSURE: 77 MMHG | BODY MASS INDEX: 25 KG/M2 | OXYGEN SATURATION: 95 % | TEMPERATURE: 99 F | WEIGHT: 157.19 LBS | SYSTOLIC BLOOD PRESSURE: 132 MMHG | HEART RATE: 82 BPM

## 2023-06-20 DIAGNOSIS — C79.51 PROSTATE CANCER METASTATIC TO BONE (HCC): ICD-10-CM

## 2023-06-20 DIAGNOSIS — C61 PROSTATE CANCER METASTATIC TO BONE (HCC): ICD-10-CM

## 2023-06-20 DIAGNOSIS — C61 PROSTATE CANCER (HCC): Primary | ICD-10-CM

## 2023-06-20 PROCEDURE — 77290 THER RAD SIMULAJ FIELD CPLX: CPT | Performed by: INTERNAL MEDICINE

## 2023-06-20 PROCEDURE — 77334 RADIATION TREATMENT AID(S): CPT | Performed by: INTERNAL MEDICINE

## 2023-06-20 PROCEDURE — 99213 OFFICE O/P EST LOW 20 MIN: CPT

## 2023-06-20 NOTE — PATIENT INSTRUCTIONS
- WE WILL CALL YOU TO SCHEDULE YOUR FIRST RADIATION TREATMENT AFTER THE PLAN GETS APPROVED.    - ONCE YOU HAVE YOUR START DATE, PLEASE COME IN 15 MINUTES BEFORE YOUR FIRST TREATMENT TO GET YOUR PSA BLOOD WORK DONE FIRST. - CALL [phone] IF YOU HAVE QUESTIONS/CONCERNS ABOUT RADIATION THERAPY.

## 2023-06-21 PROCEDURE — 77399 UNLISTED PX MED RADJ PHYSICS: CPT | Performed by: INTERNAL MEDICINE

## 2023-06-21 PROCEDURE — 77470 SPECIAL RADIATION TREATMENT: CPT | Performed by: INTERNAL MEDICINE

## 2023-07-01 ENCOUNTER — HOSPITAL ENCOUNTER (OUTPATIENT)
Dept: RADIATION ONCOLOGY | Facility: HOSPITAL | Age: 78
Discharge: HOME | End: 2023-07-01
Attending: INTERNAL MEDICINE
Payer: MEDICARE

## 2023-07-11 LAB
ABSOLUTE BASOPHILS: 18 CELLS/UL (ref 0–200)
ABSOLUTE EOSINOPHILS: 32 CELLS/UL (ref 15–500)
ABSOLUTE LYMPHOCYTES: 1440 CELLS/UL (ref 850–3900)
ABSOLUTE MONOCYTES: 905 CELLS/UL (ref 200–950)
ABSOLUTE NEUTROPHILS: 2106 CELLS/UL (ref 1500–7800)
ALBUMIN/GLOBULIN RATIO: 1.8 (CALC) (ref 1–2.5)
ALBUMIN: 4.3 G/DL (ref 3.6–5.1)
ALKALINE PHOSPHATASE: 60 U/L (ref 35–144)
ALT: 16 U/L (ref 9–46)
AST: 15 U/L (ref 10–35)
BASOPHILS: 0.4 %
BILIRUBIN, TOTAL: 0.8 MG/DL (ref 0.2–1.2)
BUN: 15 MG/DL (ref 7–25)
CALCIUM: 9.3 MG/DL (ref 8.6–10.3)
CARBON DIOXIDE: 28 MMOL/L (ref 20–32)
CHLORIDE: 104 MMOL/L (ref 98–110)
CHOL/HDLC RATIO: 2 (CALC)
CHOLESTEROL, TOTAL: 192 MG/DL
CREATININE: 0.7 MG/DL (ref 0.7–1.28)
EGFR: 96 ML/MIN/1.73M2
EOSINOPHILS: 0.7 %
GLOBULIN: 2.4 G/DL (CALC) (ref 1.9–3.7)
GLUCOSE: 97 MG/DL (ref 65–99)
HDL CHOLESTEROL: 94 MG/DL
HEMATOCRIT: 44.2 % (ref 38.5–50)
HEMOGLOBIN: 14.9 G/DL (ref 13.2–17.1)
LDL-CHOLESTEROL: 85 MG/DL (CALC)
LYMPHOCYTES: 32 %
MCH: 33.3 PG (ref 27–33)
MCHC: 33.7 G/DL (ref 32–36)
MCV: 98.7 FL (ref 80–100)
MONOCYTES: 20.1 %
MPV: 9.3 FL (ref 7.5–12.5)
NEUTROPHILS: 46.8 %
NON-HDL CHOLESTEROL: 98 MG/DL (CALC)
PLATELET COUNT: 185 THOUSAND/UL (ref 140–400)
POTASSIUM: 4.8 MMOL/L (ref 3.5–5.3)
PROTEIN, TOTAL: 6.7 G/DL (ref 6.1–8.1)
RDW: 11.9 % (ref 11–15)
RED BLOOD CELL COUNT: 4.48 MILLION/UL (ref 4.2–5.8)
SODIUM: 139 MMOL/L (ref 135–146)
T4, FREE: 1.4 NG/DL (ref 0.8–1.8)
TRIGLYCERIDES: 53 MG/DL
TSH: 1.81 MIU/L (ref 0.4–4.5)
WHITE BLOOD CELL COUNT: 4.5 THOUSAND/UL (ref 3.8–10.8)

## 2023-07-12 ENCOUNTER — OFFICE VISIT (OUTPATIENT)
Dept: FAMILY MEDICINE CLINIC | Facility: CLINIC | Age: 78
End: 2023-07-12
Payer: COMMERCIAL

## 2023-07-12 VITALS
SYSTOLIC BLOOD PRESSURE: 108 MMHG | HEART RATE: 83 BPM | DIASTOLIC BLOOD PRESSURE: 60 MMHG | WEIGHT: 158 LBS | TEMPERATURE: 97 F | OXYGEN SATURATION: 94 % | HEIGHT: 65.9 IN | RESPIRATION RATE: 18 BRPM | BODY MASS INDEX: 25.7 KG/M2

## 2023-07-12 DIAGNOSIS — E03.9 ACQUIRED HYPOTHYROIDISM: Primary | ICD-10-CM

## 2023-07-12 DIAGNOSIS — Z51.81 ENCOUNTER FOR MEDICATION MONITORING: ICD-10-CM

## 2023-07-12 DIAGNOSIS — E78.49 OTHER HYPERLIPIDEMIA: ICD-10-CM

## 2023-07-12 DIAGNOSIS — J43.2 CENTRILOBULAR EMPHYSEMA (HCC): ICD-10-CM

## 2023-07-12 DIAGNOSIS — I10 ESSENTIAL HYPERTENSION: ICD-10-CM

## 2023-07-12 DIAGNOSIS — C61 PROSTATE CANCER (HCC): ICD-10-CM

## 2023-07-12 DIAGNOSIS — L72.3 SEBACEOUS CYST: ICD-10-CM

## 2023-07-12 PROCEDURE — 1170F FXNL STATUS ASSESSED: CPT | Performed by: FAMILY MEDICINE

## 2023-07-12 PROCEDURE — 1160F RVW MEDS BY RX/DR IN RCRD: CPT | Performed by: FAMILY MEDICINE

## 2023-07-12 PROCEDURE — 3078F DIAST BP <80 MM HG: CPT | Performed by: FAMILY MEDICINE

## 2023-07-12 PROCEDURE — 99214 OFFICE O/P EST MOD 30 MIN: CPT | Performed by: FAMILY MEDICINE

## 2023-07-12 PROCEDURE — 1159F MED LIST DOCD IN RCRD: CPT | Performed by: FAMILY MEDICINE

## 2023-07-12 PROCEDURE — 3074F SYST BP LT 130 MM HG: CPT | Performed by: FAMILY MEDICINE

## 2023-07-12 PROCEDURE — 3008F BODY MASS INDEX DOCD: CPT | Performed by: FAMILY MEDICINE

## 2023-07-12 RX ORDER — ATORVASTATIN CALCIUM 10 MG/1
10 TABLET, FILM COATED ORAL NIGHTLY
Qty: 90 TABLET | Refills: 1 | Status: SHIPPED | OUTPATIENT
Start: 2023-07-12

## 2023-07-12 RX ORDER — LEVOTHYROXINE SODIUM 0.12 MG/1
125 TABLET ORAL EVERY MORNING
Qty: 90 TABLET | Refills: 2 | Status: SHIPPED | OUTPATIENT
Start: 2023-07-12

## 2023-07-12 RX ORDER — LOSARTAN POTASSIUM 50 MG/1
50 TABLET ORAL DAILY
Qty: 90 TABLET | Refills: 1 | Status: SHIPPED | OUTPATIENT
Start: 2023-07-12

## 2023-07-17 ENCOUNTER — HOSPITAL ENCOUNTER (OUTPATIENT)
Dept: RADIATION ONCOLOGY | Facility: HOSPITAL | Age: 78
Discharge: HOME | End: 2023-07-17
Attending: INTERNAL MEDICINE
Payer: MEDICARE

## 2023-07-17 PROCEDURE — 77373 STRTCTC BDY RAD THER TX DLVR: CPT | Performed by: INTERNAL MEDICINE

## 2023-07-19 ENCOUNTER — HOSPITAL ENCOUNTER (OUTPATIENT)
Dept: RADIATION ONCOLOGY | Facility: HOSPITAL | Age: 78
Discharge: HOME | End: 2023-07-19
Attending: INTERNAL MEDICINE
Payer: MEDICARE

## 2023-07-19 PROCEDURE — 77373 STRTCTC BDY RAD THER TX DLVR: CPT | Performed by: INTERNAL MEDICINE

## 2023-07-21 ENCOUNTER — HOSPITAL ENCOUNTER (OUTPATIENT)
Dept: RADIATION ONCOLOGY | Facility: HOSPITAL | Age: 78
Discharge: HOME | End: 2023-07-21
Attending: INTERNAL MEDICINE
Payer: MEDICARE

## 2023-07-21 ENCOUNTER — DOCUMENTATION ONLY (OUTPATIENT)
Dept: RADIATION ONCOLOGY | Facility: HOSPITAL | Age: 78
End: 2023-07-21

## 2023-07-21 ENCOUNTER — NURSE ONLY (OUTPATIENT)
Dept: HEMATOLOGY/ONCOLOGY | Facility: HOSPITAL | Age: 78
End: 2023-07-21
Attending: GENETIC COUNSELOR, MS

## 2023-07-21 VITALS
TEMPERATURE: 98 F | OXYGEN SATURATION: 92 % | DIASTOLIC BLOOD PRESSURE: 80 MMHG | RESPIRATION RATE: 18 BRPM | HEART RATE: 83 BPM | SYSTOLIC BLOOD PRESSURE: 121 MMHG

## 2023-07-21 DIAGNOSIS — C61 PROSTATE CANCER METASTATIC TO BONE (HCC): Primary | ICD-10-CM

## 2023-07-21 DIAGNOSIS — C61 PROSTATE CANCER (HCC): ICD-10-CM

## 2023-07-21 DIAGNOSIS — C79.51 PROSTATE CANCER METASTATIC TO BONE (HCC): Primary | ICD-10-CM

## 2023-07-21 LAB — PSA SERPL-MCNC: 2.02 NG/ML (ref ?–4)

## 2023-07-21 PROCEDURE — 77373 STRTCTC BDY RAD THER TX DLVR: CPT | Performed by: INTERNAL MEDICINE

## 2023-07-21 PROCEDURE — 36415 COLL VENOUS BLD VENIPUNCTURE: CPT

## 2023-07-21 PROCEDURE — 84153 ASSAY OF PSA TOTAL: CPT

## 2023-07-21 NOTE — PATIENT INSTRUCTIONS
- WE WILL CALL TO SCHEDULE YOUR FOLLOW-UP APPOINTMENT WITH DR. SU IN 2 MONTHS WITH ANOTHER PSA PRIOR TO APPOINTMENT    - HAVE PSA DRAWN TODAY    - CALL [phone] IF YOU HAVE ANY QUESTIONS/CONCERNS REGARDING RADIATION THERAPY

## 2023-07-26 ENCOUNTER — TELEPHONE (OUTPATIENT)
Dept: FAMILY MEDICINE CLINIC | Facility: CLINIC | Age: 78
End: 2023-07-26

## 2023-07-26 NOTE — TELEPHONE ENCOUNTER
VMML for patient's wife requesting return call to nurse with onset of neck pain and symptom detail. Patient is being seen by Radiation Oncology. Asked if the neck pain was brought to their attention. Office number given to return call.

## 2023-07-26 NOTE — TELEPHONE ENCOUNTER
Spoke to pt's spouse, David Gillespie. David Berumenalethea is requesting to speak to a nurse regarding the pt has neck pain. Pt has prostate cancer and it's spreading. Spouse would like to know what should she do.

## 2023-09-11 ENCOUNTER — NURSE ONLY (OUTPATIENT)
Dept: HEMATOLOGY/ONCOLOGY | Facility: HOSPITAL | Age: 78
End: 2023-09-11
Attending: INTERNAL MEDICINE
Payer: MEDICARE

## 2023-09-11 DIAGNOSIS — C61 PROSTATE CANCER (HCC): ICD-10-CM

## 2023-09-11 LAB — PSA SERPL-MCNC: 2.08 NG/ML (ref ?–4)

## 2023-09-11 PROCEDURE — 84153 ASSAY OF PSA TOTAL: CPT

## 2023-09-11 PROCEDURE — 36415 COLL VENOUS BLD VENIPUNCTURE: CPT

## 2023-09-19 ENCOUNTER — HOSPITAL ENCOUNTER (OUTPATIENT)
Dept: RADIATION ONCOLOGY | Facility: HOSPITAL | Age: 78
Discharge: HOME | End: 2023-09-19
Attending: GENETIC COUNSELOR, MS
Payer: MEDICARE

## 2023-09-19 VITALS
SYSTOLIC BLOOD PRESSURE: 118 MMHG | RESPIRATION RATE: 20 BRPM | TEMPERATURE: 96 F | HEART RATE: 73 BPM | OXYGEN SATURATION: 96 % | WEIGHT: 156.19 LBS | DIASTOLIC BLOOD PRESSURE: 76 MMHG | BODY MASS INDEX: 25 KG/M2

## 2023-09-19 DIAGNOSIS — C79.51 PROSTATE CANCER METASTATIC TO BONE (HCC): Primary | ICD-10-CM

## 2023-09-19 DIAGNOSIS — C61 PROSTATE CANCER METASTATIC TO BONE (HCC): Primary | ICD-10-CM

## 2023-09-19 PROCEDURE — 99213 OFFICE O/P EST LOW 20 MIN: CPT

## 2023-09-19 NOTE — PATIENT INSTRUCTIONS
- WE WILL CALL TO SCHEDULE YOUR FOLLOW-UP APPOINTMENT WITH DR. SU AFTER YOUR VISIT WITH DR. Nicol Villa    - CALL TO MAKE A FOLLOW-UP APPOINTMENT WITH DR. Nicol Villa AFTER YOUR PSA BLOOD WORK    - CALL [phone] TO SCHEDULE YOUR PSA FOR NOVEMBER 2023    - CALL [phone] IF YOU HAVE ANY QUESTIONS/CONCERNS REGARDING RADIATION THERAPY

## 2023-10-18 ENCOUNTER — TELEPHONE (OUTPATIENT)
Dept: FAMILY MEDICINE CLINIC | Facility: CLINIC | Age: 78
End: 2023-10-18

## 2023-10-18 NOTE — TELEPHONE ENCOUNTER
Reached patient for medication adherence consult. Patient overdue for refill on atorvastatin per insurance report. Patient also appears overdue for refill on losartan. Patient reports taking his medications as prescribed. He reports tolerating them well. He states that he still has medication remaining and will call for a refill when needed. Provided education on importance of adherence. Patient denies any questions or concerns with medication.

## 2023-11-14 ENCOUNTER — NURSE ONLY (OUTPATIENT)
Dept: HEMATOLOGY/ONCOLOGY | Facility: HOSPITAL | Age: 78
End: 2023-11-14
Attending: INTERNAL MEDICINE
Payer: MEDICARE

## 2023-11-14 DIAGNOSIS — C61 PROSTATE CANCER METASTATIC TO BONE (HCC): ICD-10-CM

## 2023-11-14 DIAGNOSIS — C79.51 PROSTATE CANCER METASTATIC TO BONE (HCC): ICD-10-CM

## 2023-11-14 LAB
ALBUMIN SERPL-MCNC: 3.6 G/DL (ref 3.4–5)
ALBUMIN/GLOB SERPL: 1.1 {RATIO} (ref 1–2)
ALP LIVER SERPL-CCNC: 83 U/L
ALT SERPL-CCNC: 23 U/L
ANION GAP SERPL CALC-SCNC: 2 MMOL/L (ref 0–18)
AST SERPL-CCNC: 12 U/L (ref 15–37)
BASOPHILS # BLD AUTO: 0 X10(3) UL (ref 0–0.2)
BASOPHILS NFR BLD AUTO: 0 %
BILIRUB SERPL-MCNC: 0.8 MG/DL (ref 0.1–2)
BUN BLD-MCNC: 13 MG/DL (ref 9–23)
CALCIUM BLD-MCNC: 9.5 MG/DL (ref 8.5–10.1)
CHLORIDE SERPL-SCNC: 108 MMOL/L (ref 98–112)
CO2 SERPL-SCNC: 28 MMOL/L (ref 21–32)
CREAT BLD-MCNC: 0.91 MG/DL
EGFRCR SERPLBLD CKD-EPI 2021: 88 ML/MIN/1.73M2 (ref 60–?)
EOSINOPHIL # BLD AUTO: 0.04 X10(3) UL (ref 0–0.7)
EOSINOPHIL NFR BLD AUTO: 0.9 %
ERYTHROCYTE [DISTWIDTH] IN BLOOD BY AUTOMATED COUNT: 12.1 %
FASTING STATUS PATIENT QL REPORTED: NO
GLOBULIN PLAS-MCNC: 3.3 G/DL (ref 2.8–4.4)
GLUCOSE BLD-MCNC: 118 MG/DL (ref 70–99)
HCT VFR BLD AUTO: 45.5 %
HGB BLD-MCNC: 15.1 G/DL
IMM GRANULOCYTES # BLD AUTO: 0.11 X10(3) UL (ref 0–1)
IMM GRANULOCYTES NFR BLD: 2.6 %
LYMPHOCYTES # BLD AUTO: 1.23 X10(3) UL (ref 1–4)
LYMPHOCYTES NFR BLD AUTO: 28.5 %
MCH RBC QN AUTO: 33.6 PG (ref 26–34)
MCHC RBC AUTO-ENTMCNC: 33.2 G/DL (ref 31–37)
MCV RBC AUTO: 101.1 FL
MONOCYTES # BLD AUTO: 0.98 X10(3) UL (ref 0.1–1)
MONOCYTES NFR BLD AUTO: 22.7 %
NEUTROPHILS # BLD AUTO: 1.95 X10 (3) UL (ref 1.5–7.7)
NEUTROPHILS # BLD AUTO: 1.95 X10(3) UL (ref 1.5–7.7)
NEUTROPHILS NFR BLD AUTO: 45.3 %
OSMOLALITY SERPL CALC.SUM OF ELEC: 287 MOSM/KG (ref 275–295)
PLATELET # BLD AUTO: 167 10(3)UL (ref 150–450)
POTASSIUM SERPL-SCNC: 4.7 MMOL/L (ref 3.5–5.1)
PROT SERPL-MCNC: 6.9 G/DL (ref 6.4–8.2)
PSA SERPL-MCNC: 3.98 NG/ML (ref ?–4)
RBC # BLD AUTO: 4.5 X10(6)UL
SODIUM SERPL-SCNC: 138 MMOL/L (ref 136–145)
WBC # BLD AUTO: 4.3 X10(3) UL (ref 4–11)

## 2023-11-14 PROCEDURE — 85025 COMPLETE CBC W/AUTO DIFF WBC: CPT

## 2023-11-14 PROCEDURE — 80053 COMPREHEN METABOLIC PANEL: CPT

## 2023-11-14 PROCEDURE — 84153 ASSAY OF PSA TOTAL: CPT

## 2023-11-14 PROCEDURE — 36415 COLL VENOUS BLD VENIPUNCTURE: CPT

## 2023-11-16 ENCOUNTER — OFFICE VISIT (OUTPATIENT)
Dept: HEMATOLOGY/ONCOLOGY | Facility: HOSPITAL | Age: 78
End: 2023-11-16
Attending: INTERNAL MEDICINE
Payer: MEDICARE

## 2023-11-16 VITALS
SYSTOLIC BLOOD PRESSURE: 114 MMHG | TEMPERATURE: 99 F | WEIGHT: 156 LBS | BODY MASS INDEX: 25.07 KG/M2 | RESPIRATION RATE: 18 BRPM | HEART RATE: 93 BPM | OXYGEN SATURATION: 90 % | DIASTOLIC BLOOD PRESSURE: 72 MMHG | HEIGHT: 65.98 IN

## 2023-11-16 DIAGNOSIS — D75.89 MACROCYTOSIS WITHOUT ANEMIA: ICD-10-CM

## 2023-11-16 DIAGNOSIS — C61 PROSTATE CANCER (HCC): ICD-10-CM

## 2023-11-16 DIAGNOSIS — C49.9 SARCOMA (HCC): ICD-10-CM

## 2023-11-16 DIAGNOSIS — C79.51 PROSTATE CANCER METASTATIC TO BONE (HCC): Primary | ICD-10-CM

## 2023-11-16 DIAGNOSIS — R97.20 RISING PSA LEVEL: ICD-10-CM

## 2023-11-16 DIAGNOSIS — C61 PROSTATE CANCER METASTATIC TO BONE (HCC): Primary | ICD-10-CM

## 2023-11-16 DIAGNOSIS — R97.21 RISING PSA FOLLOWING TREATMENT FOR MALIGNANT NEOPLASM OF PROSTATE: ICD-10-CM

## 2023-11-16 PROCEDURE — 99214 OFFICE O/P EST MOD 30 MIN: CPT | Performed by: SPECIALIST

## 2023-11-16 NOTE — PROGRESS NOTES
Patient is here today for follow up with Barbara Wu for Prostate cancer. Patient denies pain. Stated he is feeling good. Medication list and medical history were reviewed and updated. Education Record    Learner:  Patient  and spouse    Disease / Diagnosis: Prostate Cancer    Barriers / Limitations:  None   Comments:    Method:  Brief focused, Discussion, Printed material and Reinforcement   Comments:    General Topics:  Medication, Pain, Procedure and Plan of care reviewed   Comments:    Outcome:  Shows understanding   Comments:      AVS provided and follow up reviewed. Patient instructed to call as needed.

## 2023-12-13 ENCOUNTER — TELEPHONE (OUTPATIENT)
Dept: HEMATOLOGY/ONCOLOGY | Facility: HOSPITAL | Age: 78
End: 2023-12-13

## 2023-12-13 NOTE — TELEPHONE ENCOUNTER
Left patient a voicemail message asking him to schedule his PET scan. MD Nelly Shaw RN  When I saw him in mid November, I ordered a PET scan for prostate cancer. Patient has not yet scheduled. Please remind him to do so. \"

## 2024-01-10 ENCOUNTER — OFFICE VISIT (OUTPATIENT)
Dept: FAMILY MEDICINE CLINIC | Facility: CLINIC | Age: 79
End: 2024-01-10
Payer: COMMERCIAL

## 2024-01-10 VITALS
TEMPERATURE: 97 F | SYSTOLIC BLOOD PRESSURE: 112 MMHG | WEIGHT: 156 LBS | HEART RATE: 79 BPM | BODY MASS INDEX: 25.07 KG/M2 | RESPIRATION RATE: 16 BRPM | OXYGEN SATURATION: 92 % | HEIGHT: 65.95 IN | DIASTOLIC BLOOD PRESSURE: 68 MMHG

## 2024-01-10 DIAGNOSIS — E78.49 OTHER HYPERLIPIDEMIA: ICD-10-CM

## 2024-01-10 DIAGNOSIS — C79.51 PROSTATE CANCER METASTATIC TO BONE (HCC): ICD-10-CM

## 2024-01-10 DIAGNOSIS — D75.89 MACROCYTOSIS WITHOUT ANEMIA: ICD-10-CM

## 2024-01-10 DIAGNOSIS — Z51.81 ENCOUNTER FOR MEDICATION MONITORING: ICD-10-CM

## 2024-01-10 DIAGNOSIS — R73.09 ELEVATED HEMOGLOBIN A1C: ICD-10-CM

## 2024-01-10 DIAGNOSIS — I70.0 ATHEROSCLEROSIS OF AORTA (HCC): ICD-10-CM

## 2024-01-10 DIAGNOSIS — H91.8X3 OTHER SPECIFIED HEARING LOSS OF BOTH EARS: ICD-10-CM

## 2024-01-10 DIAGNOSIS — H26.8 OTHER CATARACT OF BOTH EYES: ICD-10-CM

## 2024-01-10 DIAGNOSIS — J43.2 CENTRILOBULAR EMPHYSEMA (HCC): ICD-10-CM

## 2024-01-10 DIAGNOSIS — I10 ESSENTIAL HYPERTENSION: ICD-10-CM

## 2024-01-10 DIAGNOSIS — Z00.00 ENCOUNTER FOR ANNUAL HEALTH EXAMINATION: Primary | ICD-10-CM

## 2024-01-10 DIAGNOSIS — I77.79 DISSECTION OF MESENTERIC ARTERY (HCC): ICD-10-CM

## 2024-01-10 DIAGNOSIS — Z86.03 HISTORY OF CAROTID BODY TUMOR: ICD-10-CM

## 2024-01-10 DIAGNOSIS — H40.033 NARROW ANGLE OF ANTERIOR CHAMBER OF BOTH EYES: ICD-10-CM

## 2024-01-10 DIAGNOSIS — E03.9 ACQUIRED HYPOTHYROIDISM: ICD-10-CM

## 2024-01-10 DIAGNOSIS — M25.562 ACUTE PAIN OF LEFT KNEE: ICD-10-CM

## 2024-01-10 DIAGNOSIS — C61 PROSTATE CANCER METASTATIC TO BONE (HCC): ICD-10-CM

## 2024-01-10 PROCEDURE — 3078F DIAST BP <80 MM HG: CPT | Performed by: FAMILY MEDICINE

## 2024-01-10 PROCEDURE — 3008F BODY MASS INDEX DOCD: CPT | Performed by: FAMILY MEDICINE

## 2024-01-10 PROCEDURE — 96160 PT-FOCUSED HLTH RISK ASSMT: CPT | Performed by: FAMILY MEDICINE

## 2024-01-10 PROCEDURE — 1160F RVW MEDS BY RX/DR IN RCRD: CPT | Performed by: FAMILY MEDICINE

## 2024-01-10 PROCEDURE — 99213 OFFICE O/P EST LOW 20 MIN: CPT | Performed by: FAMILY MEDICINE

## 2024-01-10 PROCEDURE — 1159F MED LIST DOCD IN RCRD: CPT | Performed by: FAMILY MEDICINE

## 2024-01-10 PROCEDURE — 1170F FXNL STATUS ASSESSED: CPT | Performed by: FAMILY MEDICINE

## 2024-01-10 PROCEDURE — 3074F SYST BP LT 130 MM HG: CPT | Performed by: FAMILY MEDICINE

## 2024-01-10 PROCEDURE — G0439 PPPS, SUBSEQ VISIT: HCPCS | Performed by: FAMILY MEDICINE

## 2024-01-10 PROCEDURE — 99499 UNLISTED E&M SERVICE: CPT | Performed by: FAMILY MEDICINE

## 2024-01-10 PROCEDURE — 1126F AMNT PAIN NOTED NONE PRSNT: CPT | Performed by: FAMILY MEDICINE

## 2024-01-10 NOTE — PROGRESS NOTES
Subjective:   Samuel Romero is a 78 year old male who presents for a MA (Medicare Advantage) Supervisit (Once per calendar year) and scheduled follow up of multiple significant but stable problems.     Overall doing well.  Some L knee pain for a few days. No known injury. No swelling, no giving out  Stable ANGEL   denies any chest pain or palpitations.  No cough or congestion.  No fevers or chills.  Stays physically active.  No regular, formal exercise.  2 times nocturia, no hematuria.  Stable urine stream.  No dysuria.  No pelvic pain.  Normal bowel movements.  No vision changes.  Chronic hearing loss, stable per patient.  [facility]  Appetite good.  Sleep normal.  Tolerating medication without side effects.  Denies any unusual fatigue.  Weight stable.  Seeing oncologist for history of prostate cancer with increasing PSA levels.  Had radiation therapy for metastatic rib lesion.  Did not change PSA much.  Had recent PET scan with multiple bone mets and to have f/u with Oncologist for further tx options.     History/Other:   Fall Risk Assessment:   He has been screened for Falls and is low risk.      Cognitive Assessment:   He had a completely normal cognitive assessment - see flowsheet entries     Functional Ability/Status:   Samuel Romero has some abnormal functions as listed below:  He has Hearing problems based on screening of functional status.      Depression Screening (PHQ-2/PHQ-9): PHQ-2 SCORE: 0, done 1/10/2024         Advanced Directives:   He does NOT have a Living Will. [Do you have a living will?: Yes]    He does NOT have a Power of  for Health Care. [Do you have a healthcare power of ?: Yes]    Discussed Advance Care Planning with patient (and family/surrogate if present). Standard forms made available to patient in After Visit Summary.      Patient Active Problem List   Diagnosis    Hypothyroidism    Hearing loss of both ears    Hyperlipidemia    Prostate cancer  (HCC)    Essential hypertension    Vitamin D deficiency    Macrocytosis without anemia    Atherosclerosis of aorta (HCC)    Elevated hemoglobin A1c    Spinal osteoarthritis    Narrow angle of anterior chamber of both eyes    Cataracta    Leukopenia, unspecified type    Dissection of mesenteric artery (HCC)    Centrilobular emphysema (HCC)    Hyperglycemia    Urinary retention    History of prostate cancer    Urothelial carcinoma of bladder (HCC)    Prostate cancer metastatic to bone (HCC)    History of carotid body tumor     Allergies:  He has No Known Allergies.    Current Medications:  Outpatient Medications Marked as Taking for the 1/10/24 encounter (Office Visit) with Keyla Pérez MD   Medication Sig    Misc Natural Products (GLUCOSAMINE CHOND COMPLEX/MSM OR) Take by mouth.    levothyroxine 125 MCG Oral Tab Take 1 tablet (125 mcg total) by mouth every morning.    losartan 50 MG Oral Tab Take 1 tablet (50 mg total) by mouth daily.    atorvastatin (LIPITOR) 10 MG Oral Tab Take 1 tablet (10 mg total) by mouth nightly.    ASPIRIN 81 OR Take 81 mg by mouth daily.    cyanocobalamine 1000 MCG Oral Tab Take 1 tablet (1,000 mcg total) by mouth daily.    Cholecalciferol (VITAMIN D3) 1000 UNITS Oral Cap Take 1 tablet by mouth daily.       Medical History:  He  has a past medical history of Cancer (HCC), Cancer (HCC) (12/2020), Cataracts, bilateral, COPD (chronic obstructive pulmonary disease) (HCC), COVID-19 virus infection (11/2020), Essential hypertension, Hyperlipidemia, Hypothyroidism, Hypothyroidism (06/29/2012), Narrow angle of anterior chamber of both eyes, Prostate cancer (HCC), and Superior mesenteric artery stenosis (HCC).  Surgical History:  He  has a past surgical history that includes colonoscopy (2008); vasectomy; hernia surgery (2013); tonsillectomy (1953); Yag Iridotomy - OU - Both Eyes (07/2019); skin surgery (10/2019); colonoscopy (6/2016); other surgical history (02/2016); other surgical history  (2017); other surgical history (2020); other surgical history (2021); and other surgical history (2022).   Family History:  His family history includes Breast Cancer in his sister; High Blood Pressure in his sister; Hypertension in his father; Stroke in his father; Thyroid Disorder in his father; allergic rhinitis in his sister; rheumatic heart disease in his mother.  Social History:  He  reports that he quit smoking about 8 years ago. His smoking use included cigarettes. He has a 25 pack-year smoking history. He quit smokeless tobacco use about 8 years ago. He reports current alcohol use of about 3.0 standard drinks of alcohol per week. He reports current drug use. Drug: Cannabis.    Tobacco:  He smoked tobacco in the past but quit greater than 12 months ago.  Social History    Tobacco Use      Smoking status: Former        Packs/day: 0.50        Years: 50.00        Additional pack years: 0.00        Total pack years: 25.00        Types: Cigarettes        Quit date: 5/15/2015        Years since quittin.7      Smokeless tobacco: Former        Quit date: 2015         CAGE Alcohol Screen:   CAGE screening score of 0 on 1/10/2024, showing low risk of alcohol abuse.      Patient Care Team:  Keyla Pérez MD as PCP - General (Family Practice)  Curtis Jarrell MD (UROLOGY)  Ananda Rivera (Radiology)  Alejandro Pickett MD (ONCOLOGY)  Gautam Newman Md (OPHTHALMOLOGY)  Ruddy Upton MD (SURGERY, VASCULAR)  Pina Urrutia MD (Radiation Oncology)  Suzi Irby RN as Registered Nurse (Registered Nurse)    Review of Systems  GENERAL: feels well otherwise, no unusual fatigue  SKIN: Multiple moles, no change noted.   EYES: denies blurred vision or double vision  HEENT: denies nasal congestion, sinus pain or ST  LUNGS:shortness of breath with exertion as above, history of COPD  CARDIOVASCULAR: denies chest pain on exertion  GI: denies abdominal pain, denies heartburn  :as  above.  MUSCULOSKELETAL: No significant joint pains  NEURO: denies headaches  PSYCHE: denies depression or anxiety  HEMATOLOGIC: denies h/o anemia  ENDOCRINE: on levothyroxine, feeling fine on current dose  ALL/ASTHMA: denies hx of allergy or asthma    Objective:   Physical Exam  General Appearance:  Alert, cooperative, no distress, appears stated age   Head:  Normocephalic, without obvious abnormality, atraumatic   Eyes:  PERRL, conjunctiva/corneas clear, EOM's intact   Ears:  Normal TM's and external ear canals, both ears. [place]   Nose: Nares normal, septum midline, mucosa normal, no drainage or sinus tenderness   Throat: Lips, mucosa, and tongue normal; teeth and gums normal,  Throat clear   Neck: Supple, symmetrical, trachea midline, no adenopathy, thyroid: not enlarged, symmetric, no carotid bruits, no JVD  Right lateral neck with 8 mm mobile sebaceous cyst.  Healed scar left upper neck   Back:   Symmetric, ROM normal, NT   Lungs:   Clear to auscultation bilaterally, respirations unlabored, no wheezing, no retractions   Chest Wall:  No tenderness or deformity   Heart:   Regular, rate and rhythm, S1, S2 normal, no murmur, rub or gallop   Abdomen:   Soft, non-tender, bowel sounds active all four quadrants,  no masses, no organomegaly   Genitalia: deferred   Rectal: deferred   Extremities: Extremities normal, atraumatic, no cyanosis or edema  Left knee with mild medial tenderness.  Good range of motion.  No swelling.  Negative anterior/posterior drawer signs.   Pulses: 2+ and symmetric   Skin: Skin color, texture, turgor normal, scattered lentinginous lesions on face and extremities, multiple moles, SKs   Lymph nodes: Cervical, supraclavicular, and axillary nodes normal   Neurologic:  Alert and oriented ×3, no focal deficits.  Normal gait     /68   Pulse 79   Temp 97.2 °F (36.2 °C) (Temporal)   Resp 16   Ht 5' 5.95\" (1.675 m)   Wt 156 lb (70.8 kg)   SpO2 92%   BMI 25.22 kg/m²  Estimated body mass index  is 25.22 kg/m² as calculated from the following:    Height as of this encounter: 5' 5.95\" (1.675 m).    Weight as of this encounter: 156 lb (70.8 kg).    Medicare Hearing Assessment:   Hearing Screening    Time taken: 1/10/2024 12:50 PM  Entry User: Keyla Pérez MD  Screening Method: Finger Rub  Finger Rub Result: Fail               Visual Acuity:   Right Eye Visual Acuity: Uncorrected Right Eye Chart Acuity: 20/30   Left Eye Visual Acuity: Uncorrected Left Eye Chart Acuity: 20/40   Both Eyes Visual Acuity: Uncorrected Both Eyes Chart Acuity: 20/30   Able To Tolerate Visual Acuity: Yes        Assessment & Plan:   Samuel Romero is a 78 year old male who presents for a Medicare Assessment.     1. Encounter for annual health examination (Primary)  Reviewed diet/exercise/skin care/safety/fall precautions/activities to keep cognition strong  Reviewed personal preventive plan services.  Immunizations Reviewed: Advised annual flu vaccine, patient defers, understanding risk of not being vaccinated.  Up-to-date with pneumococcal vaccines and Shingrix vaccine series.  Discussed COVID-19 booster and RSV vaccines.  Colonoscopy up-to-date.  2. Acquired hypothyroidism  Continue current levothyroxine dose.  Monitor TFTs.  -     TSH W Reflex To Free T4; Future; Expected date: 04/29/2024  3. Essential hypertension  Blood pressure well-controlled.  CPM  4. Prostate cancer metastatic to bone (HCC)  Noted patient has seen specialist at [facility] for another opinion.  History of surgical and radiation treatments  Rising PSA levels.  Recent PET scan with bony metastases.  Has follow-up with oncologist for further evaluation and treatment recommendations.  Coping well with diagnosis and understands need for follow-up.  5.  Atherosclerosis of aorta/dissection of mesenteric artery (HCC)  Continue aspirin and statin daily.  Risk factor modification.  To have abdominal ultrasound per vascular specialist in February 2024.  6. Centrilobular  emphysema (HCC)  No significant symptoms.  Has tried albuterol inhaler in the past without significant change in mild ANGEL.  Prefers to avoid inhaler use.  7. Elevated hemoglobin A1c  Monitor with [facility]  -     Hemoglobin A1C; Future; Expected date: 04/29/2024  8. Other hyperlipidemia  Reviewed diet and exercise.  Continue statin.  LFTs normal.  Monitor.  -     Lipid Panel; Future; Expected date: 04/29/2024  9. Acute pain of left knee  Knee brace daily for 1 week, remove in PMs.  Tylenol as needed.  Local ice/heat, alternate as needed.  Topical analgesics as needed  10. Other specified hearing loss of both ears  Has had audiology evaluation.  Recommended hearing aids.  Defers recheck.  11. Macrocytosis without anemia  This improves/normalizes when not consuming alcohol regularly.  Advised avoid regular alcohol intake.  Monitor.  12. Narrow angle of anterior chamber of both eyes/  Bilateral cataracts (not VS)  Up-to-date with ophthalmology follow-up.  13.  History of carotid body tumor   Had surgical excision.  Had follow-up with vascular specialist.  To have repeat carotid ultrasound in 1 year.  14.  Encounter for medication monitoring  -     TSH W Reflex To Free T4; Future; Expected date: 04/29/2024  -     Lipid Panel; Future; Expected date: 04/29/2024    The patient indicates understanding of these issues and agrees to the plan.  Reinforced healthy diet, lifestyle, and exercise.      Return in 4 months (on 5/10/2024).     Keyla Pérez MD, 1/10/2024     Supplementary Documentation:   General Health:  In the past six months, have you lost more than 10 pounds without trying?: 2 - No  Has your appetite been poor?: No  Type of Diet: Balanced  How does the patient maintain a good energy level?: Appropriate Exercise;Daily Walks;Stretching  How would you describe your daily physical activity?: Moderate  How would you describe your current health state?: Good  How do you maintain positive mental well-being?: Visiting  Family;Social Interaction;Puzzles;Games;Visiting Friends  On a scale of 0 to 10, with 0 being no pain and 10 being severe pain, what is your pain level?: 0 - (None)  In the past six months, have you experienced urine leakage?: 0-No  At any time do you feel concerned for the safety/well-being of yourself and/or your children, in your home or elsewhere?: No  Have you had any immunizations at another office such as Influenza, Hepatitis B, Tetanus, or Pneumococcal?: No          Samuel Romero's SCREENING SCHEDULE   Tests on this list are recommended by your physician but may not be covered, or covered at this frequency, by your insurer.   Please check with your insurance carrier before scheduling to verify coverage.   PREVENTATIVE SERVICES FREQUENCY &  COVERAGE DETAILS LAST COMPLETION DATE   Diabetes Screening    Fasting Blood Sugar / Glucose    One screening every 12 months if never tested or if previously tested but not diagnosed with pre-diabetes   One screening every 6 months if diagnosed with pre-diabetes Lab Results   Component Value Date     (H) 01/18/2024        Cardiovascular Disease Screening    Lipid Panel  Cholesterol  Lipoprotein (HDL)  Triglycerides Covered every 5 years for all Medicare beneficiaries without apparent signs or symptoms of cardiovascular disease Lab Results   Component Value Date    CHOLEST 192 07/10/2023    HDL 94 07/10/2023    LDL 85 07/10/2023    TRIG 53 07/10/2023         Electrocardiogram (EKG)   Covered if needed at Welcome to Medicare, and non-screening if indicated for medical reasons 06/19/2021      Ultrasound Screening for Abdominal Aortic Aneurysm (AAA) Covered once in a lifetime for one of the following risk factors    Men who are 65-75 years old and have ever smoked    Anyone with a family history -     Colorectal Cancer Screening  Covered for ages 50-85; only need ONE of the following:    Colonoscopy   Covered every 10 years    Covered every 2 years if patient is at  high risk or previous colonoscopy was abnormal 06/06/2016    Health Maintenance   Topic Date Due    Colorectal Cancer Screening  Discontinued       Flexible Sigmoidoscopy   Covered every 4 years -    Fecal Occult Blood Test Covered annually -   Prostate Cancer Screening    Prostate-Specific Antigen (PSA) Annually Lab Results   Component Value Date    PSA 8.43 (H) 01/18/2024     There are no preventive care reminders to display for this patient.   Immunizations    Influenza Covered once per flu season  Please get every year -  No recommendations at this time    Pneumococcal Each vaccine ([id number] & [id number]) covered once after 65 Prevnar 13: 05/17/2016    Triyqsklp33: 05/26/2017     No recommendations at this time    Hepatitis B One screening covered for patients with certain risk factors   -  No recommendations at this time    Tetanus Toxoid Not covered by Medicare Part B unless medically necessary (cut with metal); may be covered with your pharmacy prescription benefits -    Tetanus, Diptheria and Pertusis TD and TDaP Not covered by Medicare Part B -  No recommendations at this time    Zoster Not covered by Medicare Part B; may be covered with your pharmacy  prescription benefits 07/02/2017  No recommendations at this time     Annual Monitoring of Persistent Medications (ACE/ARB, digoxin diuretics, anticonvulsants)    Potassium Annually Lab Results   Component Value Date    K 4.7 01/18/2024         Creatinine   Annually Lab Results   Component Value Date    CREATSERUM 1.04 01/18/2024         BUN Annually Lab Results   Component Value Date    BUN 15 01/18/2024       Drug Serum Conc Annually No results found for: \"DIGOXIN\", \"DIG\", \"VALP\"           Chronic Obstructive Pulmonary Disease (COPD)    Spirometry Annually Spirometry date: 12/03/2016

## 2024-01-10 NOTE — PATIENT INSTRUCTIONS
Samuel Romero's SCREENING SCHEDULE   Tests on this list are recommended by your physician but may not be covered, or covered at this frequency, by your insurer.   Please check with your insurance carrier before scheduling to verify coverage.   PREVENTATIVE SERVICES FREQUENCY &  COVERAGE DETAILS LAST COMPLETION DATE   Diabetes Screening    Fasting Blood Sugar / Glucose    One screening every 12 months if never tested or if previously tested but not diagnosed with pre-diabetes   One screening every 6 months if diagnosed with pre-diabetes Lab Results   Component Value Date     (H) 11/14/2023        Cardiovascular Disease Screening    Lipid Panel  Cholesterol  Lipoprotein (HDL)  Triglycerides Covered every 5 years for all Medicare beneficiaries without apparent signs or symptoms of cardiovascular disease Lab Results   Component Value Date    CHOLEST 192 07/10/2023    HDL 94 07/10/2023    LDL 85 07/10/2023    TRIG 53 07/10/2023         Electrocardiogram (EKG)   Covered if needed at Welcome to Medicare, and non-screening if indicated for medical reasons 06/19/2021      Ultrasound Screening for Abdominal Aortic Aneurysm (AAA) Covered once in a lifetime for one of the following risk factors   • Men who are 65-75 years old and have ever smoked   • Anyone with a family history -     Colorectal Cancer Screening  Covered for ages 50-85; only need ONE of the following:    Colonoscopy   Covered every 10 years    Covered every 2 years if patient is at high risk or previous colonoscopy was abnormal 06/06/2016    Health Maintenance   Topic Date Due   • Colorectal Cancer Screening  Discontinued       Flexible Sigmoidoscopy   Covered every 4 years -    Fecal Occult Blood Test Covered annually -   Prostate Cancer Screening    Prostate-Specific Antigen (PSA) Annually Lab Results   Component Value Date    PSA 3.98 11/14/2023     There are no preventive care reminders to display for this patient.   Immunizations    Influenza  Covered once per flu season  Please get every year -  No recommendations at this time    Pneumococcal Each vaccine ([id number] & [id number]) covered once after 65 Prevnar 13: 05/17/2016    [id number]: 05/26/2017     No recommendations at this time    Hepatitis B One screening covered for patients with certain risk factors   -  No recommendations at this time    Tetanus Toxoid Not covered by Medicare Part B unless medically necessary (cut with metal); may be covered with your pharmacy prescription benefits -    Tetanus, Diptheria and Pertusis TD and TDaP Not covered by Medicare Part B -  No recommendations at this time    Zoster Not covered by Medicare Part B; may be covered with your pharmacy  prescription benefits 07/02/2017  No recommendations at this time     Annual Monitoring of Persistent Medications (ACE/ARB, digoxin diuretics, anticonvulsants)    Potassium Annually Lab Results   Component Value Date    K 4.7 11/14/2023         Creatinine   Annually Lab Results   Component Value Date    CREATSERUM 0.91 11/14/2023         BUN Annually Lab Results   Component Value Date    BUN 13 11/14/2023       Drug Serum Conc Annually No results found for: \"DIGOXIN\", \"DIG\", \"VALP\"         Chronic Obstructive Pulmonary Disease (COPD)    Spirometry Annually Spirometry date: 12/03/2016

## 2024-01-12 DIAGNOSIS — C79.51 PROSTATE CANCER METASTATIC TO BONE (HCC): Primary | ICD-10-CM

## 2024-01-12 DIAGNOSIS — C61 PROSTATE CANCER METASTATIC TO BONE (HCC): Primary | ICD-10-CM

## 2024-01-18 ENCOUNTER — OFFICE VISIT (OUTPATIENT)
Dept: HEMATOLOGY/ONCOLOGY | Facility: HOSPITAL | Age: 79
End: 2024-01-18
Attending: INTERNAL MEDICINE
Payer: MEDICARE

## 2024-01-18 VITALS
OXYGEN SATURATION: 92 % | HEART RATE: 85 BPM | DIASTOLIC BLOOD PRESSURE: 90 MMHG | BODY MASS INDEX: 24.09 KG/M2 | SYSTOLIC BLOOD PRESSURE: 138 MMHG | HEIGHT: 65.98 IN | TEMPERATURE: 99 F | WEIGHT: 149.88 LBS

## 2024-01-18 DIAGNOSIS — C61 PROSTATE CANCER (HCC): Primary | ICD-10-CM

## 2024-01-18 DIAGNOSIS — C61 PROSTATE CANCER METASTATIC TO BONE (HCC): ICD-10-CM

## 2024-01-18 DIAGNOSIS — C79.51 PROSTATE CANCER METASTATIC TO BONE (HCC): ICD-10-CM

## 2024-01-18 LAB
ALBUMIN SERPL-MCNC: 3.8 G/DL (ref 3.4–5)
ALBUMIN/GLOB SERPL: 1.1 {RATIO} (ref 1–2)
ALP LIVER SERPL-CCNC: 84 U/L
ALT SERPL-CCNC: 23 U/L
ANION GAP SERPL CALC-SCNC: 5 MMOL/L (ref 0–18)
AST SERPL-CCNC: 8 U/L (ref 15–37)
BASOPHILS # BLD AUTO: 0.02 X10(3) UL (ref 0–0.2)
BASOPHILS NFR BLD AUTO: 0.4 %
BILIRUB SERPL-MCNC: 0.6 MG/DL (ref 0.1–2)
BUN BLD-MCNC: 15 MG/DL (ref 9–23)
CALCIUM BLD-MCNC: 9.3 MG/DL (ref 8.5–10.1)
CHLORIDE SERPL-SCNC: 108 MMOL/L (ref 98–112)
CO2 SERPL-SCNC: 27 MMOL/L (ref 21–32)
CREAT BLD-MCNC: 1.04 MG/DL
EGFRCR SERPLBLD CKD-EPI 2021: 74 ML/MIN/1.73M2 (ref 60–?)
EOSINOPHIL # BLD AUTO: 0.06 X10(3) UL (ref 0–0.7)
EOSINOPHIL NFR BLD AUTO: 1.2 %
ERYTHROCYTE [DISTWIDTH] IN BLOOD BY AUTOMATED COUNT: 12.8 %
FASTING STATUS PATIENT QL REPORTED: NO
GLOBULIN PLAS-MCNC: 3.4 G/DL (ref 2.8–4.4)
GLUCOSE BLD-MCNC: 114 MG/DL (ref 70–99)
HCT VFR BLD AUTO: 44.2 %
HGB BLD-MCNC: 15.2 G/DL
IMM GRANULOCYTES # BLD AUTO: 0.24 X10(3) UL (ref 0–1)
IMM GRANULOCYTES NFR BLD: 4.7 %
LYMPHOCYTES # BLD AUTO: 1.45 X10(3) UL (ref 1–4)
LYMPHOCYTES NFR BLD AUTO: 28.5 %
MCH RBC QN AUTO: 33.7 PG (ref 26–34)
MCHC RBC AUTO-ENTMCNC: 34.4 G/DL (ref 31–37)
MCV RBC AUTO: 98 FL
MONOCYTES # BLD AUTO: 1.04 X10(3) UL (ref 0.1–1)
MONOCYTES NFR BLD AUTO: 20.5 %
NEUTROPHILS # BLD AUTO: 2.27 X10 (3) UL (ref 1.5–7.7)
NEUTROPHILS # BLD AUTO: 2.27 X10(3) UL (ref 1.5–7.7)
NEUTROPHILS NFR BLD AUTO: 44.7 %
OSMOLALITY SERPL CALC.SUM OF ELEC: 292 MOSM/KG (ref 275–295)
PLATELET # BLD AUTO: 173 10(3)UL (ref 150–450)
POTASSIUM SERPL-SCNC: 4.7 MMOL/L (ref 3.5–5.1)
PROT SERPL-MCNC: 7.2 G/DL (ref 6.4–8.2)
PSA SERPL-MCNC: 8.43 NG/ML (ref ?–4)
RBC # BLD AUTO: 4.51 X10(6)UL
SODIUM SERPL-SCNC: 140 MMOL/L (ref 136–145)
WBC # BLD AUTO: 5.1 X10(3) UL (ref 4–11)

## 2024-01-18 PROCEDURE — 85025 COMPLETE CBC W/AUTO DIFF WBC: CPT | Performed by: SPECIALIST

## 2024-01-18 PROCEDURE — 96402 CHEMO HORMON ANTINEOPL SQ/IM: CPT

## 2024-01-18 PROCEDURE — 84153 ASSAY OF PSA TOTAL: CPT | Performed by: SPECIALIST

## 2024-01-18 PROCEDURE — 80053 COMPREHEN METABOLIC PANEL: CPT | Performed by: SPECIALIST

## 2024-01-18 PROCEDURE — 99211 OFF/OP EST MAY X REQ PHY/QHP: CPT

## 2024-01-18 PROCEDURE — 36415 COLL VENOUS BLD VENIPUNCTURE: CPT

## 2024-01-18 NOTE — PROGRESS NOTES
Education Record    Learner:  Patient and Family Member    Disease / Diagnosis: prostate CA - trelstar inj     Barriers / Limitations:  None   Comments:    Method:  Brief focused   Comments:    General Topics:  Plan of care reviewed   Comments:    Outcome:  Shows understanding   Comments:    Tolerated without issue. Future appt TBD.

## 2024-01-18 NOTE — PROGRESS NOTES
Patient is here for MD f/u for Prostate cancer. Patient had a PSMA scan at  on 1/8. Here to go over the results and next steps. He is feeling well. Denies pain.     Education Record    Learner:  Patient and Spouse    Disease / Diagnosis:  Prostate cancer     Barriers / Limitations:  None   Comments:    Method:  Discussion   Comments:    General Topics:  Plan of care reviewed   Comments:    Outcome:  Shows understanding   Comments:

## 2024-01-20 RX ORDER — PREDNISONE 10 MG/1
10 TABLET ORAL DAILY
Qty: 30 TABLET | Refills: 11 | Status: SHIPPED | OUTPATIENT
Start: 2024-01-20

## 2024-01-20 RX ORDER — ABIRATERONE ACETATE 250 MG/1
250 TABLET ORAL DAILY
Qty: 30 TABLET | Refills: 11 | Status: SHIPPED | OUTPATIENT
Start: 2024-01-20

## 2024-01-21 NOTE — PROGRESS NOTES
[facility] Progress Note      Patient Name: Samuel Romero   YOB: 1945  Medical Record Number: [id number]  Attending Physician: Alejandro Pickett M.D.     The 21st Century Cures Act makes medical notes like these available to patients in the interest of transparency. Please be advised this is a medical document. Medical documents are intended to carry relevant information, facts as evident, and the clinical opinion of the practitioner. The medical note is intended as peer to peer communication and may appear blunt or direct. It is written in medical language and may contain abbreviations or verbiage that are unfamiliar.     Date of Visit: 1/18/2024       Chief Complaint  Prostate cancer - follow up.     Oncologic History  Samuel Romero is a 78 year old male referred for thrombocytosis. A review of the patient's laboratory studies in the Epic system show a normal platelet count of 308 in 04/2014. By 05/2015 the platelet count had increased to 528 K/mcl. Over time it continued to increase to its most recent value of 911 K/mcl on 10/11/2016. Patient has a history of prostate cancer for which he underwent prostatectomy in 02/2016 followed by external beam radiotherapy and short term androgen ablation. He had a colonoscopy in 2016 which was unremarkable.     Patient was empirically started on hydroxyurea while workup was initiated. Peripheral blood for JAK2, CALR, and MPL and BCR/ABL ultimately returned as negative. Bone marrow biopsy was performed but was not diagnostic. Repeat bone marrow biopsy was unremarkable.     It was then discovered that patient was drinking at least 6 cans of beer per day. He was asked to decrease his alcohol intake. With that his complete blood count normalized.     Routine follow up PSA on 09/30/2021 was 0.15 ng/ml as compared to 0.02 ng/ml on 03/09/2021. PSA was repeated on 01/10/2022 and had increased to 0.40 ng/ml. PSMA PET scan on 01/21/2022 showed  uptake in a solitary bone lesion involving the 10th right rib.     From 02/21/2022 to 02/25/2022 he received radiation therapy to the right rib lesion.    From 07/17/2023 to 07/21/2023 he received SBRT to a left rib lesion after an increase in PSA.    Genetic testing performed on 03/07/2022 showed no known pathogenic variants in 84 genes including: AIP, ALK, APC, COLTON, AXIN2, BAP1, BARD1, BLM, BMPR1A, BRCA1, BRCA2, BRIP1, CASR, CDC73, CDH1, CDK4, CDKN1B, CDKN1C, CDKN2A (p14ARF), CDKN2A (p.46QBK5x), CEBPA, CHEK2, DICER1, DIS3L2, EGFR (sequencing only), FH, FLCN, GATA2, GPC3, GREM1 (promoter region deletion/duplication testing only), HOXB13 (sequencing only), HRAS, KIT,MAX,  MEN1, MET, MITF (sequencing only), MLH1, MSH2 (including EPCAM rearrangement testing), MSH6, MUTYH, NBN, NF1, NF2, PALB2, PDGRFA, PHOX2B, PMS2, POLD1, POLE, POT1, RCUZF6S, PTCH1, PTEN, RAD50, RAD51C, RAD51D, RB1, RECQL4, RET, RUNX1, SDHAF2, SDHA (sequencing only), SDHB, SDHC, SDHD, SMAD4, SMARCA4, SMARCB1, SMARCE1, STK11, SUFU, TERC, TERT, MZSU956, TP53, TSC1, TSC2, VHL, WRN, and WT1.  Four variants of uncertain significance, FLCN c.850G>A (p.Vnd217Nmq)(possibly mosaic), MSH6 c.1685A>G (p.Kxo167Lve), PALB2 c.37G>A (p.Pah14Vpr), and WRN c.1531G>C (p.Qif226Ils), were identified.      On 09/2021 he was found to have new increase in PSA. In 02/2022, he underwent radiation therapy to a right rib lesion as the only site identified on a PSMA PET scan. Following radiation, however, his PSA did not decrease. In 07/2023 he underwent SBRT to a left rib lesion.     In 11/2023, a follow up PSMA PET scan was ordered but patient did not schedule. Instead, he sought an opinion at [facility] where a PSMA PET scan was ordered. This was performed on 01/08/2024 which showed multiple bone metastases.     PSA on 01/18/2024 was 8.43 ng/ml.     History of Present Illness  Patient returns for follow up. He has no complaints.     Performance Status   Karnofsky 100% -  Normal, no complaints.    Past Medical History (historical data, reviewed by physician)  Essential hypertension; hypothyroidism; hyperlipidemia; prostate cancer (Anderson 4+5=9, L4eV2Y6) s/p prostatectomy and radiation therapy and short term androgen ablation; COVID 19; non-invasive low grade papillary urothelial carcinoma; paraganglioma.     Past Surgical History (historical data, reviewed by physician)  Tonsillectomy; vasectomy; robotic assisted laparoscopic prostatectomy and pelvic lymphadenectomy; inguinal hernia repair; TURBT; left neck paraganglioma.     Family History (historical data, reviewed by physician)  Sister with breast cancer; sister with colon cancer; father with prostate cancer.    Social History (historical data, reviewed by physician)  Previous tobacco use but quit 2015; uses alcohol daily.      Current Medications    Misc Natural Products (GLUCOSAMINE CHOND COMPLEX/MSM OR) Take by mouth.      levothyroxine 125 MCG Oral Tab Take 1 tablet (125 mcg total) by mouth every morning. 90 tablet 2    losartan 50 MG Oral Tab Take 1 tablet (50 mg total) by mouth daily. 90 tablet 1    atorvastatin (LIPITOR) 10 MG Oral Tab Take 1 tablet (10 mg total) by mouth nightly. 90 tablet 1    ASPIRIN 81 OR Take 81 mg by mouth daily.      cyanocobalamine 1000 MCG Oral Tab Take 1 tablet (1,000 mcg total) by mouth daily.      Cholecalciferol (VITAMIN D3) 1000 UNITS Oral Cap Take 1 tablet by mouth daily.     Allergies   Mr. Romero has No Known Allergies.    Vital Signs   /90 (BP Location: Left arm, Patient Position: Sitting)   Pulse 85   Temp 98.6 °F (37 °C) (Temporal)   Ht 1.676 m (5' 5.98\")   Wt 68 kg (149 lb 14.4 oz)   SpO2 92%   BMI 24.21 kg/m²     Physical Examination   Constitutional Well developed, well nourished. Appears close to chronological age. No apparent distress.   Head  Normocephalic and atraumatic.  Eyes                  Conjunctiva clear; sclera anicteric.  ENMT  External nose normal; external  ears normal.  Respiratory Normal effort; no respiratory distress.  Neurologic Motor and sensory grossly intact.  Psychiatric Mood and affect appropriate.    Laboratory  Recent Results (from the past 168 hour(s))   COMP METABOLIC PANEL [E]    Collection Time: 01/18/24  2:53 PM   Result Value Ref Range    Glucose 114 (H) 70 - 99 mg/dL    Sodium 140 136 - 145 mmol/L    Potassium 4.7 3.5 - 5.1 mmol/L    Chloride 108 98 - 112 mmol/L    CO2 27.0 21.0 - 32.0 mmol/L    Anion Gap 5 0 - 18 mmol/L    BUN 15 9 - 23 mg/dL    Creatinine 1.04 0.70 - 1.30 mg/dL    Calcium, Total 9.3 8.5 - 10.1 mg/dL    Calculated Osmolality 292 275 - 295 mOsm/kg    eGFR-Cr 74 >=60 mL/min/1.73m2    AST 8 (L) 15 - 37 U/L    ALT 23 16 - 61 U/L    Alkaline Phosphatase 84 45 - 117 U/L    Bilirubin, Total 0.6 0.1 - 2.0 mg/dL    Total Protein 7.2 6.4 - 8.2 g/dL    Albumin 3.8 3.4 - 5.0 g/dL    Globulin  3.4 2.8 - 4.4 g/dL    A/G Ratio 1.1 1.0 - 2.0    Patient Fasting for CMP? No    PSA TOTAL, - DIAGNOSTIC [E]    Collection Time: 01/18/24  2:53 PM   Result Value Ref Range    PSA 8.43 (H) <=4.00 ng/mL   CBC W/ DIFFERENTIAL    Collection Time: 01/18/24  2:53 PM   Result Value Ref Range    WBC 5.1 4.0 - 11.0 x10(3) uL    RBC 4.51 3.80 - 5.80 x10(6)uL    HGB 15.2 13.0 - 17.5 g/dL    HCT 44.2 39.0 - 53.0 %    .0 150.0 - 450.0 10(3)uL    MCV 98.0 80.0 - 100.0 fL    MCH 33.7 26.0 - 34.0 pg    MCHC 34.4 31.0 - 37.0 g/dL    RDW 12.8 %    Neutrophil Absolute Prelim 2.27 1.50 - 7.70 x10 (3) uL    Neutrophil Absolute 2.27 1.50 - 7.70 x10(3) uL    Lymphocyte Absolute 1.45 1.00 - 4.00 x10(3) uL    Monocyte Absolute 1.04 (H) 0.10 - 1.00 x10(3) uL    Eosinophil Absolute 0.06 0.00 - 0.70 x10(3) uL    Basophil Absolute 0.02 0.00 - 0.20 x10(3) uL    Immature Granulocyte Absolute 0.24 0.00 - 1.00 x10(3) uL    Neutrophil % 44.7 %    Lymphocyte % 28.5 %    Monocyte % 20.5 %    Eosinophil % 1.2 %    Basophil % 0.4 %    Immature Granulocyte % 4.7 %     Radiology  PETCT F18  Prostate Subsequent Treatment Strategy    Anatomical Region Laterality Modality   Chest -- Positron Emission Tomography (PET)   Abdomen -- --   Pelvis -- --   Thigh -- --     Narrative    DATE: 1/8/2024    EXAM: [id number] (Pylarify) PET-CT, Skull Base to Thigh    HISTORY: Prostate carcinoma status post prostatectomy 2016.  Right 10th rib lesion status post radiotherapy 2022  Left ninth rib lesion status post radiotherapy 2023.  Biochemical recurrence..    Current PSA: 4.43 ng/mL. 12/1/2023    COMPARISON: None.    The patient was intravenously injected with 10.06 mCi [id number] in the right antecubital vein. The scan was performed 90 minutes after isotope injection. The scan was performed from the vertex of the skull to the knees. Axial, coronal, sagittal and 3-D rotating images were generated. CT scan was performed without oral or IV contrast for anatomic localization and attenuation correction. The PET/CT with fusion images were reviewed and interpreted on a dedicated PET workstation.    FINDINGS:    Prostate bed:  Prostatectomy.  Area obscured due to significant activity in the urinary bladder.  No gross abnormal uptake.    Lymph nodes:  No abnormal uptake  Uptake at the neck is considered likely associated with salivary glands and misregistration artifact.  Nonspecific uptake projects over the right vocal cord and right arytenoid.    Skeletal lesions:  Multi focal skeletal lesions are identified compatible consistent with metastatic disease.  These include:  Right C6-7 facet  Right transverse process and posterior lamina of T1.  Anterior left third rib.  Right sixth rib at the mid axillary line.  Right transverse process of T8.  Right transverse process of T9.  Mild uptake posterior left ninth rib likely reflecting previous lesion and radiation changes.  Spinous process of L4.        IMPRESSION:    Multifocal abnormal bony uptake consistent with metastatic disease.    Nonspecific head and neck avidity likely  representing a combination of physiologic uptake and misregistration.  Attention recommended on follow-up.    FINAL REPORT  Attending Radiologist:  Shon Rosa MD  Date Signed Off:  01/10/2024 09:11  Procedure Note    Shon Rosa MD - 01/10/2024  Formatting of this note might be different from the original.  DATE: 1/8/2024    EXAM: [id number] (Pylarify) PET-CT, Skull Base to Thigh    HISTORY: Prostate carcinoma status post prostatectomy 2016.  Right 10th rib lesion status post radiotherapy 2022  Left ninth rib lesion status post radiotherapy 2023.  Biochemical recurrence..    Current PSA: 4.43 ng/mL. 12/1/2023    COMPARISON: None.    The patient was intravenously injected with 10.06 mCi [id number] in the right antecubital vein. The scan was performed 90 minutes after isotope injection. The scan was performed from the vertex of the skull to the knees. Axial, coronal, sagittal and 3-D rotating images were generated. CT scan was performed without oral or IV contrast for anatomic localization and attenuation correction. The PET/CT with fusion images were reviewed and interpreted on a dedicated PET workstation.    FINDINGS:    Prostate bed:  Prostatectomy.  Area obscured due to significant activity in the urinary bladder.  No gross abnormal uptake.    Lymph nodes:  No abnormal uptake  Uptake at the neck is considered likely associated with salivary glands and misregistration artifact.  Nonspecific uptake projects over the right vocal cord and right arytenoid.    Skeletal lesions:  Multi focal skeletal lesions are identified compatible consistent with metastatic disease.  These include:  Right C6-7 facet  Right transverse process and posterior lamina of T1.  Anterior left third rib.  Right sixth rib at the mid axillary line.  Right transverse process of T8.  Right transverse process of T9.  Mild uptake posterior left ninth rib likely reflecting previous lesion and radiation changes.  Spinous process of  L4.        IMPRESSION:    Multifocal abnormal bony uptake consistent with metastatic disease.    Nonspecific head and neck avidity likely representing a combination of physiologic uptake and misregistration.  Attention recommended on follow-up.    FINAL REPORT  Attending Radiologist:  Shon Rosa MD  Date Signed Off:  01/10/2024 09:11  Exam End: 01/08/24 15:28    Specimen Collected: 01/10/24 08:44 Last Resulted: 01/10/24 09:11   Received From: [facility]  Result Received: 01/18/24 14:52     Impression and Plan   1.   Prostate cancer, metastatic: PSA has increased. PSMA PET scan showed multiple bone metastases. I recommend systemic therapy with combined androgen ablation.           I recommend triptorelin every 3 months and abiraterone 250 mg with low fat diet and prednisone 10 mg daily. I reviewed the palliative goals of therapy and the potential adverse effects including, but not limited to, hot flashes, bone density loss, change in mood, loss of muscle mass, diarrhea, hepatotoxicity, peripheral edema. Patient expressed understanding and provided written and verbal consent for treatment. He will receive triptorelin today and we will arrange for abiraterone.     2.   Bone metastasis: Discussed the use of zoledronic acid. I recommend that patient see dentistry and complete any recommended dental work before the start of therapy. At his next visit, will check vitamin D level as this should be replete before the start of treatment.     Patient will continue to receive longitudinal care by me for the complex care required for the cancer diagnosis including the expected complications related to anticancer therapy.     Planned Follow Up   Patient will return for follow up 2 weeks after he begins abiraterone.    Encounter Time  Pre-charting/reviewing medical records: 10 minutes.  In room with patient obtaining history, performing exam, counseling on diagnosis, and reviewing plan: 30  minutes.  Orders/notes: 5 minutes.  Total time: 45 minutes.    Electronically signed by:    Alejandro Pickett M.D.  System Medical Director of Oncology Services  [facility]

## 2024-01-30 ENCOUNTER — TELEPHONE (OUTPATIENT)
Dept: HEMATOLOGY/ONCOLOGY | Facility: HOSPITAL | Age: 79
End: 2024-01-30

## 2024-01-30 NOTE — TELEPHONE ENCOUNTER
Spoke with patient. He picked up Zytiga/Prednisone from [facility] on 1/22. He started it on Friday 1/26. Scheduled patient for labs in 2 weeks and MD follow up w/labs in 4 weeks.

## 2024-01-31 PROBLEM — D44.6 CAROTID BODY TUMOR (HCC): Status: RESOLVED | Noted: 2021-06-21 | Resolved: 2024-01-31

## 2024-01-31 PROBLEM — Z86.03: Status: ACTIVE | Noted: 2024-01-31

## 2024-02-08 ENCOUNTER — TELEPHONE (OUTPATIENT)
Dept: HEMATOLOGY/ONCOLOGY | Facility: HOSPITAL | Age: 79
End: 2024-02-08

## 2024-02-08 ENCOUNTER — NURSE ONLY (OUTPATIENT)
Dept: HEMATOLOGY/ONCOLOGY | Facility: HOSPITAL | Age: 79
End: 2024-02-08
Attending: INTERNAL MEDICINE
Payer: MEDICARE

## 2024-02-08 DIAGNOSIS — C61 PROSTATE CANCER METASTATIC TO BONE (HCC): ICD-10-CM

## 2024-02-08 DIAGNOSIS — C79.51 PROSTATE CANCER METASTATIC TO BONE (HCC): ICD-10-CM

## 2024-02-08 LAB
ALBUMIN SERPL-MCNC: 3.8 G/DL (ref 3.4–5)
ALBUMIN/GLOB SERPL: 1.2 {RATIO} (ref 1–2)
ALP LIVER SERPL-CCNC: 68 U/L
ALT SERPL-CCNC: 24 U/L
ANION GAP SERPL CALC-SCNC: 3 MMOL/L (ref 0–18)
AST SERPL-CCNC: 11 U/L (ref 15–37)
BASOPHILS # BLD AUTO: 0.01 X10(3) UL (ref 0–0.2)
BASOPHILS NFR BLD AUTO: 0.2 %
BILIRUB SERPL-MCNC: 1.1 MG/DL (ref 0.1–2)
BUN BLD-MCNC: 16 MG/DL (ref 9–23)
CALCIUM BLD-MCNC: 9.4 MG/DL (ref 8.5–10.1)
CHLORIDE SERPL-SCNC: 109 MMOL/L (ref 98–112)
CO2 SERPL-SCNC: 31 MMOL/L (ref 21–32)
CREAT BLD-MCNC: 0.84 MG/DL
EGFRCR SERPLBLD CKD-EPI 2021: 90 ML/MIN/1.73M2 (ref 60–?)
EOSINOPHIL # BLD AUTO: 0.04 X10(3) UL (ref 0–0.7)
EOSINOPHIL NFR BLD AUTO: 0.6 %
ERYTHROCYTE [DISTWIDTH] IN BLOOD BY AUTOMATED COUNT: 12.4 %
GLOBULIN PLAS-MCNC: 3.2 G/DL (ref 2.8–4.4)
GLUCOSE BLD-MCNC: 92 MG/DL (ref 70–99)
HCT VFR BLD AUTO: 46.4 %
HGB BLD-MCNC: 15.3 G/DL
IMM GRANULOCYTES # BLD AUTO: 0.17 X10(3) UL (ref 0–1)
IMM GRANULOCYTES NFR BLD: 2.8 %
LYMPHOCYTES # BLD AUTO: 1.64 X10(3) UL (ref 1–4)
LYMPHOCYTES NFR BLD AUTO: 26.6 %
MCH RBC QN AUTO: 32.8 PG (ref 26–34)
MCHC RBC AUTO-ENTMCNC: 33 G/DL (ref 31–37)
MCV RBC AUTO: 99.4 FL
MONOCYTES # BLD AUTO: 1.35 X10(3) UL (ref 0.1–1)
MONOCYTES NFR BLD AUTO: 21.9 %
NEUTROPHILS # BLD AUTO: 2.96 X10 (3) UL (ref 1.5–7.7)
NEUTROPHILS # BLD AUTO: 2.96 X10(3) UL (ref 1.5–7.7)
NEUTROPHILS NFR BLD AUTO: 47.9 %
OSMOLALITY SERPL CALC.SUM OF ELEC: 297 MOSM/KG (ref 275–295)
PLATELET # BLD AUTO: 169 10(3)UL (ref 150–450)
POTASSIUM SERPL-SCNC: 4.2 MMOL/L (ref 3.5–5.1)
PROT SERPL-MCNC: 7 G/DL (ref 6.4–8.2)
PSA SERPL-MCNC: 1.25 NG/ML (ref ?–4)
RBC # BLD AUTO: 4.67 X10(6)UL
SODIUM SERPL-SCNC: 143 MMOL/L (ref 136–145)
VIT D+METAB SERPL-MCNC: 35.7 NG/ML (ref 30–100)
WBC # BLD AUTO: 6.2 X10(3) UL (ref 4–11)

## 2024-02-08 PROCEDURE — 85025 COMPLETE CBC W/AUTO DIFF WBC: CPT

## 2024-02-08 PROCEDURE — 84153 ASSAY OF PSA TOTAL: CPT

## 2024-02-08 PROCEDURE — 80053 COMPREHEN METABOLIC PANEL: CPT

## 2024-02-08 PROCEDURE — 82306 VITAMIN D 25 HYDROXY: CPT

## 2024-02-08 PROCEDURE — 36415 COLL VENOUS BLD VENIPUNCTURE: CPT

## 2024-02-08 NOTE — TELEPHONE ENCOUNTER
Called patient and let him know that per Dr Pickett his cmp/cbc labs are good and his PSA level is 1.3 from a 8.4. Patient conveyed understanding.

## 2024-02-21 NOTE — PROGRESS NOTES
[facility] Progress Note      Patient Name: Samuel Romero   YOB: 1945  Medical Record Number: [id number]  Attending Physician: Alejandro Pickett M.D.     The 21st Century Cures Act makes medical notes like these available to patients in the interest of transparency. Please be advised this is a medical document. Medical documents are intended to carry relevant information, facts as evident, and the clinical opinion of the practitioner. The medical note is intended as peer to peer communication and may appear blunt or direct. It is written in medical language and may contain abbreviations or verbiage that are unfamiliar.     Date of Visit: 2/22/2024       Chief Complaint  Prostate cancer - follow up.     Oncologic History  Samuel Romero is a 78 year old male referred for thrombocytosis. A review of the patient's laboratory studies in the Epic system show a normal platelet count of 308 in 04/2014. By 05/2015 the platelet count had increased to 528 K/mcl. Over time it continued to increase to its most recent value of 911 K/mcl on 10/11/2016. Patient has a history of prostate cancer for which he underwent prostatectomy in 02/2016 followed by external beam radiotherapy and short term androgen ablation. He had a colonoscopy in 2016 which was unremarkable.     Patient was empirically started on hydroxyurea while workup was initiated. Peripheral blood for JAK2, CALR, and MPL and BCR/ABL ultimately returned as negative. Bone marrow biopsy was performed but was not diagnostic. Repeat bone marrow biopsy was unremarkable.     It was then discovered that patient was drinking at least 6 cans of beer per day. He was asked to decrease his alcohol intake. With that his complete blood count normalized.     Routine follow up PSA on 09/30/2021 was 0.15 ng/ml as compared to 0.02 ng/ml on 03/09/2021. PSA was repeated on 01/10/2022 and had increased to 0.40 ng/ml. PSMA PET scan on 01/21/2022 showed  uptake in a solitary bone lesion involving the 10th right rib.     From 02/21/2022 to 02/25/2022 he received radiation therapy to the right rib lesion.    From 07/17/2023 to 07/21/2023 he received SBRT to a left rib lesion after an increase in PSA.    Genetic testing performed on 03/07/2022 showed no known pathogenic variants in 84 genes including: AIP, ALK, APC, COLTON, AXIN2, BAP1, BARD1, BLM, BMPR1A, BRCA1, BRCA2, BRIP1, CASR, CDC73, CDH1, CDK4, CDKN1B, CDKN1C, CDKN2A (p14ARF), CDKN2A (p.01FGR4w), CEBPA, CHEK2, DICER1, DIS3L2, EGFR (sequencing only), FH, FLCN, GATA2, GPC3, GREM1 (promoter region deletion/duplication testing only), HOXB13 (sequencing only), HRAS, KIT,MAX,  MEN1, MET, MITF (sequencing only), MLH1, MSH2 (including EPCAM rearrangement testing), MSH6, MUTYH, NBN, NF1, NF2, PALB2, PDGRFA, PHOX2B, PMS2, POLD1, POLE, POT1, CSTWW2X, PTCH1, PTEN, RAD50, RAD51C, RAD51D, RB1, RECQL4, RET, RUNX1, SDHAF2, SDHA (sequencing only), SDHB, SDHC, SDHD, SMAD4, SMARCA4, SMARCB1, SMARCE1, STK11, SUFU, TERC, TERT, ZIYC780, TP53, TSC1, TSC2, VHL, WRN, and WT1.  Four variants of uncertain significance, FLCN c.850G>A (p.Kig467Jsf)(possibly mosaic), MSH6 c.1685A>G (p.Hfl398Coj), PALB2 c.37G>A (p.Dic04Awu), and WRN c.1531G>C (p.Paq433Rfp), were identified.      On 09/2021 he was found to have new increase in PSA. In 02/2022, he underwent radiation therapy to a right rib lesion as the only site identified on a PSMA PET scan. Following radiation, however, his PSA did not decrease. In 07/2023 he underwent SBRT to a left rib lesion.     In 11/2023, a follow up PSMA PET scan was ordered but patient did not schedule. Instead, he sought an opinion at [facility] where a PSMA PET scan was ordered. This was performed on 01/08/2024 which showed multiple bone metastases.     PSA on 01/18/2024 was 8.43 ng/ml.     On 01/18/2024 he began systemic therapy with androgen ablation with triptorelin and soon after abiraterone/prednisone were  added.     History of Present Illness  Patient returns for follow up. He has no complaints. He denies hot flashes, change in mood, bone pain. Upon direct questioning, patient has not been following the instructions to take abiraterone with a low fat meal. He has been taking it with a breakfast of engle and eggs.     Performance Status   Karnofsky 100% - Normal, no complaints.    Past Medical History (historical data, reviewed by physician)  Essential hypertension; hypothyroidism; hyperlipidemia; prostate cancer ([place] 4+5=9, R9mQ1D1) s/p prostatectomy and radiation therapy and short term androgen ablation; COVID 19; non-invasive low grade papillary urothelial carcinoma; paraganglioma.     Past Surgical History (historical data, reviewed by physician)  Tonsillectomy; vasectomy; robotic assisted laparoscopic prostatectomy and pelvic lymphadenectomy; inguinal hernia repair; TURBT; left neck paraganglioma.     Family History (historical data, reviewed by physician)  Sister with breast cancer; sister with colon cancer; father with prostate cancer.    Social History (historical data, reviewed by physician)  Previous tobacco use but quit 2015; uses alcohol daily.      Current Medications    Abiraterone Acetate 250 MG Oral Tab Take 250 mg by mouth daily. Take with a low fat meal 30 tablet 11    predniSONE 10 MG Oral Tab Take 1 tablet (10 mg total) by mouth daily. 30 tablet 11    Misc Natural Products (GLUCOSAMINE CHOND COMPLEX/MSM OR) Take by mouth.      levothyroxine 125 MCG Oral Tab Take 1 tablet (125 mcg total) by mouth every morning. 90 tablet 2    losartan 50 MG Oral Tab Take 1 tablet (50 mg total) by mouth daily. 90 tablet 1    atorvastatin (LIPITOR) 10 MG Oral Tab Take 1 tablet (10 mg total) by mouth nightly. 90 tablet 1    ASPIRIN 81 OR Take 81 mg by mouth daily.      cyanocobalamine 1000 MCG Oral Tab Take 1 tablet (1,000 mcg total) by mouth daily.      Cholecalciferol (VITAMIN D3) 1000 UNITS Oral Cap Take 1  tablet by mouth daily.       Allergies   Mr. Romero has No Known Allergies.    Vital Signs   /88 (BP Location: Right arm, Patient Position: Sitting, Cuff Size: adult)   Pulse 75   Temp 97.2 °F (36.2 °C) (Temporal)   Resp 16   Ht 1.676 m (5' 5.98\")   Wt 70.3 kg (155 lb)   SpO2 91%   BMI 25.03 kg/m²     Physical Examination   Constitutional Well developed, well nourished. Appears close to chronological age. No apparent distress.   Head  Normocephalic and atraumatic.  Eyes                  Conjunctiva clear; sclera anicteric.  ENMT  External nose normal; external ears normal.  Neck  Supple; no masses.   Lymphatics No cervical, supraclavicular, axillary adenopathy.  Respiratory Normal effort; no respiratory distress; clear to auscultation bilaterally.  Cardiovascular Regular rate and rhythm; normal S1S2.  Abdomen Soft; not tender; no masses; no hepatosplenomegaly.   Extremities No lower extremity edema.   Neurologic Motor and sensory grossly intact.  Psychiatric Mood and affect appropriate.    Laboratory  Recent Results (from the past 672 hour(s))   COMP METABOLIC PANEL [E]    Collection Time: 02/08/24 10:14 AM   Result Value Ref Range    Glucose 92 70 - 99 mg/dL    Sodium 143 136 - 145 mmol/L    Potassium 4.2 3.5 - 5.1 mmol/L    Chloride 109 98 - 112 mmol/L    CO2 31.0 21.0 - 32.0 mmol/L    Anion Gap 3 0 - 18 mmol/L    BUN 16 9 - 23 mg/dL    Creatinine 0.84 0.70 - 1.30 mg/dL    Calcium, Total 9.4 8.5 - 10.1 mg/dL    Calculated Osmolality 297 (H) 275 - 295 mOsm/kg    eGFR-Cr 90 >=60 mL/min/1.73m2    AST 11 (L) 15 - 37 U/L    ALT 24 16 - 61 U/L    Alkaline Phosphatase 68 45 - 117 U/L    Bilirubin, Total 1.1 0.1 - 2.0 mg/dL    Total Protein 7.0 6.4 - 8.2 g/dL    Albumin 3.8 3.4 - 5.0 g/dL    Globulin  3.2 2.8 - 4.4 g/dL    A/G Ratio 1.2 1.0 - 2.0    Patient Fasting for CMP? Patient not present    PSA TOTAL, - DIAGNOSTIC [E]    Collection Time: 02/08/24 10:14 AM   Result Value Ref Range    PSA 1.25 <=4.00  ng/mL   CBC W/ DIFFERENTIAL    Collection Time: 02/08/24 10:14 AM   Result Value Ref Range    WBC 6.2 4.0 - 11.0 x10(3) uL    RBC 4.67 3.80 - 5.80 x10(6)uL    HGB 15.3 13.0 - 17.5 g/dL    HCT 46.4 39.0 - 53.0 %    .0 150.0 - 450.0 10(3)uL    MCV 99.4 80.0 - 100.0 fL    MCH 32.8 26.0 - 34.0 pg    MCHC 33.0 31.0 - 37.0 g/dL    RDW 12.4 %    Neutrophil Absolute Prelim 2.96 1.50 - 7.70 x10 (3) uL    Neutrophil Absolute 2.96 1.50 - 7.70 x10(3) uL    Lymphocyte Absolute 1.64 1.00 - 4.00 x10(3) uL    Monocyte Absolute 1.35 (H) 0.10 - 1.00 x10(3) uL    Eosinophil Absolute 0.04 0.00 - 0.70 x10(3) uL    Basophil Absolute 0.01 0.00 - 0.20 x10(3) uL    Immature Granulocyte Absolute 0.17 0.00 - 1.00 x10(3) uL    Neutrophil % 47.9 %    Lymphocyte % 26.6 %    Monocyte % 21.9 %    Eosinophil % 0.6 %    Basophil % 0.2 %    Immature Granulocyte % 2.8 %   Vitamin D, 25-Hydroxy    Collection Time: 02/08/24 10:14 AM   Result Value Ref Range    Vitamin D, 25OH, Total 35.7 30.0 - 100.0 ng/mL     Impression and Plan   1.   Prostate cancer, metastatic: PSMA PET scan showed multiple bone metastases.           Continue combined androgen ablation with triptorelin and abiraterone/prednisone. PSA has decreased. Next triptorelin due on 04/11/2024.           Stressed the importance of taking abiraterone with low fat diet. If patient is unable to comply, will increase dose to 1000 mg daily.     2.   Bone metastasis: Discussed the use of zoledronic acid. I recommend that patient see dentistry and complete any recommended dental work before the start of therapy. Vitamin D level is normal.    Patient will continue to receive longitudinal care by me for the complex care required for the cancer diagnosis including the expected complications related to anticancer therapy.     Planned Follow Up   Patient will return for follow up and therapy on 04/11/2024.     Electronically signed by:    Alejandro Pickett M.D.  System Medical Director of Oncology  Services  [facility]

## 2024-02-22 ENCOUNTER — OFFICE VISIT (OUTPATIENT)
Dept: HEMATOLOGY/ONCOLOGY | Facility: HOSPITAL | Age: 79
End: 2024-02-22
Attending: INTERNAL MEDICINE
Payer: MEDICARE

## 2024-02-22 VITALS
RESPIRATION RATE: 16 BRPM | BODY MASS INDEX: 24.91 KG/M2 | HEART RATE: 75 BPM | TEMPERATURE: 97 F | SYSTOLIC BLOOD PRESSURE: 145 MMHG | DIASTOLIC BLOOD PRESSURE: 88 MMHG | WEIGHT: 155 LBS | OXYGEN SATURATION: 91 % | HEIGHT: 65.98 IN

## 2024-02-22 DIAGNOSIS — C61 PROSTATE CANCER METASTATIC TO BONE (HCC): Primary | ICD-10-CM

## 2024-02-22 DIAGNOSIS — Z79.899 ON ANDROGEN ABLATION THERAPY: ICD-10-CM

## 2024-02-22 DIAGNOSIS — C79.51 PROSTATE CANCER METASTATIC TO BONE (HCC): Primary | ICD-10-CM

## 2024-02-22 PROCEDURE — 99214 OFFICE O/P EST MOD 30 MIN: CPT | Performed by: SPECIALIST

## 2024-02-22 PROCEDURE — G2211 COMPLEX E/M VISIT ADD ON: HCPCS | Performed by: SPECIALIST

## 2024-02-22 NOTE — PROGRESS NOTES
Patient is here for MD f/u for Prostate cancer. Patient has been on Zytiga/Prednisone for one month. Tolerating well. Appetite and energy level are good. Due for Triptorelin in April.     Education Record    Learner:  Patient and Spouse    Disease / Diagnosis:  Prostate cancer     Barriers / Limitations:  None   Comments:    Method:  Discussion   Comments:    General Topics:  Plan of care reviewed   Comments:    Outcome:  Shows understanding   Comments:

## 2024-03-05 ENCOUNTER — TELEPHONE (OUTPATIENT)
Dept: HEMATOLOGY/ONCOLOGY | Facility: HOSPITAL | Age: 79
End: 2024-03-05

## 2024-03-05 NOTE — TELEPHONE ENCOUNTER
Patient's wife called regarding all of the dental work the patient needs to have done.  Dental works needs an ok from Dr Pickett.    Please all Dental [facility] @ [phone].  Fax# [phone]

## 2024-04-06 ENCOUNTER — HOSPITAL ENCOUNTER (EMERGENCY)
Age: 79
End: 2024-04-06

## 2024-04-09 ENCOUNTER — OFFICE VISIT (OUTPATIENT)
Dept: HEMATOLOGY/ONCOLOGY | Facility: HOSPITAL | Age: 79
End: 2024-04-09
Attending: INTERNAL MEDICINE
Payer: MEDICARE

## 2024-04-09 VITALS
HEART RATE: 86 BPM | SYSTOLIC BLOOD PRESSURE: 132 MMHG | DIASTOLIC BLOOD PRESSURE: 80 MMHG | TEMPERATURE: 98 F | WEIGHT: 153.5 LBS | OXYGEN SATURATION: 90 % | RESPIRATION RATE: 18 BRPM | BODY MASS INDEX: 24.67 KG/M2 | HEIGHT: 65.98 IN

## 2024-04-09 DIAGNOSIS — C79.51 PROSTATE CANCER METASTATIC TO BONE (HCC): ICD-10-CM

## 2024-04-09 DIAGNOSIS — C61 PROSTATE CANCER METASTATIC TO BONE (HCC): ICD-10-CM

## 2024-04-09 DIAGNOSIS — Z79.899 ON ANDROGEN ABLATION THERAPY: Primary | ICD-10-CM

## 2024-04-09 DIAGNOSIS — C61 PROSTATE CANCER (HCC): Primary | ICD-10-CM

## 2024-04-09 LAB
ALBUMIN SERPL-MCNC: 3.6 G/DL (ref 3.4–5)
ALBUMIN/GLOB SERPL: 1.2 {RATIO} (ref 1–2)
ALP LIVER SERPL-CCNC: 67 U/L
ALT SERPL-CCNC: 26 U/L
ANION GAP SERPL CALC-SCNC: 6 MMOL/L (ref 0–18)
AST SERPL-CCNC: 19 U/L (ref 15–37)
BASOPHILS # BLD AUTO: 0.01 X10(3) UL (ref 0–0.2)
BASOPHILS NFR BLD AUTO: 0.2 %
BILIRUB SERPL-MCNC: 0.8 MG/DL (ref 0.1–2)
BUN BLD-MCNC: 9 MG/DL (ref 9–23)
CALCIUM BLD-MCNC: 10.1 MG/DL (ref 8.5–10.1)
CHLORIDE SERPL-SCNC: 107 MMOL/L (ref 98–112)
CO2 SERPL-SCNC: 27 MMOL/L (ref 21–32)
CREAT BLD-MCNC: 0.68 MG/DL
EGFRCR SERPLBLD CKD-EPI 2021: 96 ML/MIN/1.73M2 (ref 60–?)
EOSINOPHIL # BLD AUTO: 0.03 X10(3) UL (ref 0–0.7)
EOSINOPHIL NFR BLD AUTO: 0.5 %
ERYTHROCYTE [DISTWIDTH] IN BLOOD BY AUTOMATED COUNT: 12.9 %
GLOBULIN PLAS-MCNC: 3 G/DL (ref 2.8–4.4)
GLUCOSE BLD-MCNC: 115 MG/DL (ref 70–99)
HCT VFR BLD AUTO: 40.5 %
HGB BLD-MCNC: 14.2 G/DL
IMM GRANULOCYTES # BLD AUTO: 0.14 X10(3) UL (ref 0–1)
IMM GRANULOCYTES NFR BLD: 2.2 %
LYMPHOCYTES # BLD AUTO: 0.91 X10(3) UL (ref 1–4)
LYMPHOCYTES NFR BLD AUTO: 14.4 %
MCH RBC QN AUTO: 34.1 PG (ref 26–34)
MCHC RBC AUTO-ENTMCNC: 35.1 G/DL (ref 31–37)
MCV RBC AUTO: 97.1 FL
MONOCYTES # BLD AUTO: 0.69 X10(3) UL (ref 0.1–1)
MONOCYTES NFR BLD AUTO: 10.9 %
NEUTROPHILS # BLD AUTO: 4.53 X10 (3) UL (ref 1.5–7.7)
NEUTROPHILS # BLD AUTO: 4.53 X10(3) UL (ref 1.5–7.7)
NEUTROPHILS NFR BLD AUTO: 71.8 %
OSMOLALITY SERPL CALC.SUM OF ELEC: 290 MOSM/KG (ref 275–295)
PLATELET # BLD AUTO: 160 10(3)UL (ref 150–450)
POTASSIUM SERPL-SCNC: 4.5 MMOL/L (ref 3.5–5.1)
PROT SERPL-MCNC: 6.6 G/DL (ref 6.4–8.2)
PSA SERPL-MCNC: 0.1 NG/ML (ref ?–4)
RBC # BLD AUTO: 4.17 X10(6)UL
SODIUM SERPL-SCNC: 140 MMOL/L (ref 136–145)
WBC # BLD AUTO: 6.3 X10(3) UL (ref 4–11)

## 2024-04-09 PROCEDURE — G2211 COMPLEX E/M VISIT ADD ON: HCPCS | Performed by: SPECIALIST

## 2024-04-09 PROCEDURE — 99214 OFFICE O/P EST MOD 30 MIN: CPT | Performed by: SPECIALIST

## 2024-04-09 PROCEDURE — 96402 CHEMO HORMON ANTINEOPL SQ/IM: CPT

## 2024-04-09 RX ORDER — NAPROXEN 500 MG/1
TABLET ORAL AS NEEDED
COMMUNITY
Start: 2024-03-12

## 2024-04-09 RX ORDER — TRAMADOL HYDROCHLORIDE 50 MG/1
TABLET ORAL AS NEEDED
COMMUNITY
Start: 2024-03-12

## 2024-04-09 RX ORDER — AMOXICILLIN 500 MG/1
TABLET, FILM COATED ORAL AS NEEDED
COMMUNITY
Start: 2024-03-12

## 2024-04-09 NOTE — PROGRESS NOTES
[facility] Progress Note      Patient Name: Samuel Romero   YOB: 1945  Medical Record Number: [id number]  Attending Physician: Alejandro Pickett M.D.     The 21st Century Cures Act makes medical notes like these available to patients in the interest of transparency. Please be advised this is a medical document. Medical documents are intended to carry relevant information, facts as evident, and the clinical opinion of the practitioner. The medical note is intended as peer to peer communication and may appear blunt or direct. It is written in medical language and may contain abbreviations or verbiage that are unfamiliar.     Date of Visit: 4/9/2024       Chief Complaint  Prostate cancer - follow up.     Oncologic History  Samuel Romero is a 78 year old male referred for thrombocytosis. A review of the patient's laboratory studies in the Epic system show a normal platelet count of 308 in 04/2014. By 05/2015 the platelet count had increased to 528 K/mcl. Over time it continued to increase to its most recent value of 911 K/mcl on 10/11/2016. Patient has a history of prostate cancer for which he underwent prostatectomy in 02/2016 followed by external beam radiotherapy and short term androgen ablation. He had a colonoscopy in 2016 which was unremarkable.     Patient was empirically started on hydroxyurea while workup was initiated. Peripheral blood for JAK2, CALR, and MPL and BCR/ABL ultimately returned as negative. Bone marrow biopsy was performed but was not diagnostic. Repeat bone marrow biopsy was unremarkable.     It was then discovered that patient was drinking at least 6 cans of beer per day. He was asked to decrease his alcohol intake. With that his complete blood count normalized.     Routine follow up PSA on 09/30/2021 was 0.15 ng/ml as compared to 0.02 ng/ml on 03/09/2021. PSA was repeated on 01/10/2022 and had increased to 0.40 ng/ml. PSMA PET scan on 01/21/2022 showed  uptake in a solitary bone lesion involving the 10th right rib.     From 02/21/2022 to 02/25/2022 he received radiation therapy to the right rib lesion.    From 07/17/2023 to 07/21/2023 he received SBRT to a left rib lesion after an increase in PSA.    Genetic testing performed on 03/07/2022 showed no known pathogenic variants in 84 genes including: AIP, ALK, APC, COLTON, AXIN2, BAP1, BARD1, BLM, BMPR1A, BRCA1, BRCA2, BRIP1, CASR, CDC73, CDH1, CDK4, CDKN1B, CDKN1C, CDKN2A (p14ARF), CDKN2A (p.45UAL0p), CEBPA, CHEK2, DICER1, DIS3L2, EGFR (sequencing only), FH, FLCN, GATA2, GPC3, GREM1 (promoter region deletion/duplication testing only), HOXB13 (sequencing only), HRAS, KIT,MAX,  MEN1, MET, MITF (sequencing only), MLH1, MSH2 (including EPCAM rearrangement testing), MSH6, MUTYH, NBN, NF1, NF2, PALB2, PDGRFA, PHOX2B, PMS2, POLD1, POLE, POT1, RWMEG6V, PTCH1, PTEN, RAD50, RAD51C, RAD51D, RB1, RECQL4, RET, RUNX1, SDHAF2, SDHA (sequencing only), SDHB, SDHC, SDHD, SMAD4, SMARCA4, SMARCB1, SMARCE1, STK11, SUFU, TERC, TERT, NKLK570, TP53, TSC1, TSC2, VHL, WRN, and WT1.  Four variants of uncertain significance, FLCN c.850G>A (p.Phv846Qmr)(possibly mosaic), MSH6 c.1685A>G (p.Ihd085Wlf), PALB2 c.37G>A (p.Dem37Jub), and WRN c.1531G>C (p.Okw075Ocb), were identified.      On 09/2021 he was found to have new increase in PSA. In 02/2022, he underwent radiation therapy to a right rib lesion as the only site identified on a PSMA PET scan. Following radiation, however, his PSA did not decrease. In 07/2023 he underwent SBRT to a left rib lesion.     In 11/2023, a follow up PSMA PET scan was ordered but patient did not schedule. Instead, he sought an opinion at [facility] where a PSMA PET scan was ordered. This was performed on 01/08/2024 which showed multiple bone metastases.     PSA on 01/18/2024 was 8.43 ng/ml.     On 01/18/2024 he began systemic therapy with androgen ablation with triptorelin and soon after abiraterone/prednisone were  added.     History of Present Illness  Patient returns for follow up. He has no complaints. He reports mild hot flashes. No change in mood. He is taking abiraterone with a low fat meal. Patient has not yet completed his planned dental work.     Performance Status   Karnofsky 100% - Normal, no complaints.    Past Medical History (historical data, reviewed by physician)  Essential hypertension; hypothyroidism; hyperlipidemia; prostate cancer ([place] 4+5=9, R5oV6X8) s/p prostatectomy and radiation therapy and short term androgen ablation; COVID 19; non-invasive low grade papillary urothelial carcinoma; paraganglioma.     Past Surgical History (historical data, reviewed by physician)  Tonsillectomy; vasectomy; robotic assisted laparoscopic prostatectomy and pelvic lymphadenectomy; inguinal hernia repair; TURBT; left neck paraganglioma.     Family History (historical data, reviewed by physician)  Sister with breast cancer; sister with colon cancer; father with prostate cancer.    Social History (historical data, reviewed by physician)  Previous tobacco use but quit 2015; uses alcohol daily.      Current Medications    traMADol 50 MG Oral Tab as needed.      naproxen 500 MG Oral Tab as needed.      amoxicillin 500 MG Oral Tab as needed.      Abiraterone Acetate 250 MG Oral Tab Take 250 mg by mouth daily. Take with a low fat meal 30 tablet 11    predniSONE 10 MG Oral Tab Take 1 tablet (10 mg total) by mouth daily. 30 tablet 11    Misc Natural Products (GLUCOSAMINE CHOND COMPLEX/MSM OR) Take by mouth.      levothyroxine 125 MCG Oral Tab Take 1 tablet (125 mcg total) by mouth every morning. 90 tablet 2    losartan 50 MG Oral Tab Take 1 tablet (50 mg total) by mouth daily. 90 tablet 1    atorvastatin (LIPITOR) 10 MG Oral Tab Take 1 tablet (10 mg total) by mouth nightly. 90 tablet 1    ASPIRIN 81 OR Take 81 mg by mouth daily.      cyanocobalamine 1000 MCG Oral Tab Take 1 tablet (1,000 mcg total) by mouth daily.       Cholecalciferol (VITAMIN D3) 1000 UNITS Oral Cap Take 1 tablet by mouth daily.       Allergies   Mr. Romero has No Known Allergies.    Vital Signs   /80 (BP Location: Left arm, Patient Position: Sitting, Cuff Size: adult)   Pulse 86   Temp 98.1 °F (36.7 °C) (Tympanic)   Resp 18   Ht 1.676 m (5' 5.98\")   Wt 69.6 kg (153 lb 8 oz)   SpO2 90%   BMI 24.79 kg/m²     Physical Examination   Constitutional Well developed, well nourished. Appears close to chronological age. No apparent distress.   Head  Normocephalic and atraumatic.  Eyes                  Conjunctiva clear; sclera anicteric.  ENMT  External nose normal; external ears normal.  Neck  Supple; no masses.   Lymphatics No cervical, supraclavicular, axillary adenopathy.  Respiratory Normal effort; no respiratory distress; clear to auscultation bilaterally.  Cardiovascular Regular rate and rhythm; normal S1S2.  Abdomen Soft; not tender; no masses; no hepatosplenomegaly.   Extremities No lower extremity edema.   Neurologic Motor and sensory grossly intact.  Psychiatric Mood and affect appropriate.    Laboratory  Recent Results (from the past 168 hour(s))   COMP METABOLIC PANEL [E]    Collection Time: 04/09/24 10:21 AM   Result Value Ref Range    Glucose 115 (H) 70 - 99 mg/dL    Sodium 140 136 - 145 mmol/L    Potassium 4.5 3.5 - 5.1 mmol/L    Chloride 107 98 - 112 mmol/L    CO2 27.0 21.0 - 32.0 mmol/L    Anion Gap 6 0 - 18 mmol/L    BUN 9 9 - 23 mg/dL    Creatinine 0.68 (L) 0.70 - 1.30 mg/dL    Calcium, Total 10.1 8.5 - 10.1 mg/dL    Calculated Osmolality 290 275 - 295 mOsm/kg    eGFR-Cr 96 >=60 mL/min/1.73m2    AST 19 15 - 37 U/L    ALT 26 16 - 61 U/L    Alkaline Phosphatase 67 45 - 117 U/L    Bilirubin, Total 0.8 0.1 - 2.0 mg/dL    Total Protein 6.6 6.4 - 8.2 g/dL    Albumin 3.6 3.4 - 5.0 g/dL    Globulin  3.0 2.8 - 4.4 g/dL    A/G Ratio 1.2 1.0 - 2.0    Patient Fasting for CMP? Patient not present    PSA - DIAGNOSTIC [E]    Collection Time: 04/09/24  10:21 AM   Result Value Ref Range    PSA 0.10 <=4.00 ng/mL   CBC W/ DIFFERENTIAL    Collection Time: 04/09/24 10:21 AM   Result Value Ref Range    WBC 6.3 4.0 - 11.0 x10(3) uL    RBC 4.17 3.80 - 5.80 x10(6)uL    HGB 14.2 13.0 - 17.5 g/dL    HCT 40.5 39.0 - 53.0 %    .0 150.0 - 450.0 10(3)uL    MCV 97.1 80.0 - 100.0 fL    MCH 34.1 (H) 26.0 - 34.0 pg    MCHC 35.1 31.0 - 37.0 g/dL    RDW 12.9 %    Neutrophil Absolute Prelim 4.53 1.50 - 7.70 x10 (3) uL    Neutrophil Absolute 4.53 1.50 - 7.70 x10(3) uL    Lymphocyte Absolute 0.91 (L) 1.00 - 4.00 x10(3) uL    Monocyte Absolute 0.69 0.10 - 1.00 x10(3) uL    Eosinophil Absolute 0.03 0.00 - 0.70 x10(3) uL    Basophil Absolute 0.01 0.00 - 0.20 x10(3) uL    Immature Granulocyte Absolute 0.14 0.00 - 1.00 x10(3) uL    Neutrophil % 71.8 %    Lymphocyte % 14.4 %    Monocyte % 10.9 %    Eosinophil % 0.5 %    Basophil % 0.2 %    Immature Granulocyte % 2.2 %     Impression and Plan   1.   Prostate cancer, metastatic: PSMA PET scan showed multiple bone metastases.           Continue combined androgen ablation with triptorelin and abiraterone/prednisone. PSA has decreased. Next triptorelin today.     2.   Bone metastasis: Plan to start zoledronic acid once dental work has been completed.     Patient will continue to receive longitudinal care by me for the complex care required for the cancer diagnosis including the expected complications related to anticancer therapy.     Planned Follow Up   Patient will return for follow up and therapy in 3 months.    Electronically signed by:    Alejandro Pickett M.D.  System Medical Director of Oncology Services  [facility]

## 2024-04-09 NOTE — PROGRESS NOTES
Patient is here for MD f/u and Trelstar injection. Patient is in the midst of dental work, Zometa has not been initiated yet. Patient continues on Abiraterone/Prednisone. Tolerating well. Appetite and energy level are good.      Education Record    Learner:  Patient    Disease / Diagnosis:  Prostate cancer     Barriers / Limitations:  None   Comments:    Method:  Discussion   Comments:    General Topics:  Plan of care reviewed   Comments:    Outcome:  Shows understanding   Comments:

## 2024-04-11 ENCOUNTER — APPOINTMENT (OUTPATIENT)
Dept: HEMATOLOGY/ONCOLOGY | Facility: HOSPITAL | Age: 79
End: 2024-04-11
Attending: INTERNAL MEDICINE
Payer: MEDICARE

## 2024-04-15 ENCOUNTER — TELEPHONE (OUTPATIENT)
Dept: HEMATOLOGY/ONCOLOGY | Facility: HOSPITAL | Age: 79
End: 2024-04-15

## 2024-04-15 NOTE — TELEPHONE ENCOUNTER
Test(s) completed: PSA  Results: PSA is lower. Now at 0.10 which is down from 1.25 from last visit.   Plan: good result per Dr Pickett

## 2024-05-07 DIAGNOSIS — E03.9 ACQUIRED HYPOTHYROIDISM: ICD-10-CM

## 2024-05-07 RX ORDER — LEVOTHYROXINE SODIUM 0.12 MG/1
125 TABLET ORAL EVERY MORNING
Qty: 90 TABLET | Refills: 0 | Status: SHIPPED | OUTPATIENT
Start: 2024-05-07

## 2024-05-07 NOTE — TELEPHONE ENCOUNTER
Thyroid Medication Protocol [id number]/07/2024 08:37 AM   Protocol Details TSH in past 12 months    Last TSH value is normal    In person appointment or virtual visit in the past 12 mos or appointment in next 3 mos     LOV 1/10/24     LAST LAB  7/11/23    LAST RX  7/12/23 90 with 2     Next OV   Future Appointments   Date Time Provider Department Center   5/15/2024 12:00 PM Keyla Pérez MD [facility]   7/11/2024 10:30 AM Alejandro Pickett MD  [facility]   7/11/2024 10:45 AM  [place] RN3  [facility]         PROTOCOL pass

## 2024-05-09 LAB
CHOL/HDLC RATIO: 1.7 (CALC)
CHOLESTEROL, TOTAL: 204 MG/DL
HDL CHOLESTEROL: 121 MG/DL
HEMOGLOBIN A1C: 5.8 % OF TOTAL HGB
LDL-CHOLESTEROL: 68 MG/DL (CALC)
NON-HDL CHOLESTEROL: 83 MG/DL (CALC)
TRIGLYCERIDES: 74 MG/DL
TSH W/REFLEX TO FT4: 0.42 MIU/L (ref 0.4–4.5)

## 2024-05-15 ENCOUNTER — OFFICE VISIT (OUTPATIENT)
Dept: FAMILY MEDICINE CLINIC | Facility: CLINIC | Age: 79
End: 2024-05-15

## 2024-05-15 VITALS
RESPIRATION RATE: 16 BRPM | BODY MASS INDEX: 24.72 KG/M2 | SYSTOLIC BLOOD PRESSURE: 110 MMHG | HEART RATE: 80 BPM | WEIGHT: 152 LBS | OXYGEN SATURATION: 93 % | DIASTOLIC BLOOD PRESSURE: 72 MMHG | HEIGHT: 65.9 IN | TEMPERATURE: 97 F

## 2024-05-15 DIAGNOSIS — R73.03 PREDIABETES: ICD-10-CM

## 2024-05-15 DIAGNOSIS — I10 ESSENTIAL HYPERTENSION: Primary | ICD-10-CM

## 2024-05-15 DIAGNOSIS — J43.2 CENTRILOBULAR EMPHYSEMA (HCC): ICD-10-CM

## 2024-05-15 DIAGNOSIS — E78.49 OTHER HYPERLIPIDEMIA: ICD-10-CM

## 2024-05-15 DIAGNOSIS — Z51.81 ENCOUNTER FOR MEDICATION MONITORING: ICD-10-CM

## 2024-05-15 DIAGNOSIS — C79.51 PROSTATE CANCER METASTATIC TO BONE (HCC): ICD-10-CM

## 2024-05-15 DIAGNOSIS — E03.9 ACQUIRED HYPOTHYROIDISM: ICD-10-CM

## 2024-05-15 DIAGNOSIS — C61 PROSTATE CANCER METASTATIC TO BONE (HCC): ICD-10-CM

## 2024-05-15 DIAGNOSIS — G89.29 CHRONIC PAIN OF BOTH KNEES: ICD-10-CM

## 2024-05-15 DIAGNOSIS — M25.562 CHRONIC PAIN OF BOTH KNEES: ICD-10-CM

## 2024-05-15 DIAGNOSIS — M25.561 CHRONIC PAIN OF BOTH KNEES: ICD-10-CM

## 2024-05-15 PROCEDURE — 3074F SYST BP LT 130 MM HG: CPT | Performed by: FAMILY MEDICINE

## 2024-05-15 PROCEDURE — 3008F BODY MASS INDEX DOCD: CPT | Performed by: FAMILY MEDICINE

## 2024-05-15 PROCEDURE — 3078F DIAST BP <80 MM HG: CPT | Performed by: FAMILY MEDICINE

## 2024-05-15 PROCEDURE — 99214 OFFICE O/P EST MOD 30 MIN: CPT | Performed by: FAMILY MEDICINE

## 2024-05-15 PROCEDURE — 1159F MED LIST DOCD IN RCRD: CPT | Performed by: FAMILY MEDICINE

## 2024-05-15 PROCEDURE — 1160F RVW MEDS BY RX/DR IN RCRD: CPT | Performed by: FAMILY MEDICINE

## 2024-05-15 PROCEDURE — 1170F FXNL STATUS ASSESSED: CPT | Performed by: FAMILY MEDICINE

## 2024-05-15 RX ORDER — LEVOTHYROXINE SODIUM 0.12 MG/1
125 TABLET ORAL EVERY MORNING
Qty: 90 TABLET | Refills: 1 | Status: SHIPPED | OUTPATIENT
Start: 2024-05-15

## 2024-05-15 RX ORDER — LOSARTAN POTASSIUM 50 MG/1
50 TABLET ORAL DAILY
Qty: 90 TABLET | Refills: 2 | Status: SHIPPED | OUTPATIENT
Start: 2024-05-15

## 2024-05-15 NOTE — PROGRESS NOTES
Samuel Romero is a 78 year old male.  HPI:  Pt is here for f/u on labs and medication.   Pt state doing well overall.   Tolerating medication without side effects.  Denies any chest pain.  Chronic, stable dyspnea on exertion.  No palpitations.  No cough or congestion.  No fevers or chills.  Some bilat knee pain on/off for a while.  No known injury.  Pain worse with standing and walking too long.  No swelling.  Taking glucosamine supplement and seems to be helping.   No unusual fatigue.  Keeps physically active.    Current Outpatient Medications   Medication Sig Dispense Refill    Glucos-Chondroit-Hyaluron-MSM (GLUCOSAMINE CHONDROITIN JOINT OR) Take by mouth daily.      losartan 50 MG Oral Tab Take 1 tablet (50 mg total) by mouth daily. 90 tablet 2    levothyroxine 125 MCG Oral Tab Take 1 tablet (125 mcg total) by mouth every morning. 90 tablet 1    amoxicillin 500 MG Oral Tab as needed.      Abiraterone Acetate 250 MG Oral Tab Take 250 mg by mouth daily. Take with a low fat meal 30 tablet 11    predniSONE 10 MG Oral Tab Take 1 tablet (10 mg total) by mouth daily. 30 tablet 11    Misc Natural Products (GLUCOSAMINE CHOND COMPLEX/MSM OR) Take by mouth.      ASPIRIN 81 OR Take 81 mg by mouth daily.      Cholecalciferol (VITAMIN D3) 1000 UNITS Oral Cap Take 1 tablet by mouth daily.      atorvastatin 10 MG Oral Tab Take one tablet by mouth once daily at night. 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    Prostate cancer (HCC)    Superior mesenteric artery stenosis (HCC)       Past Surgical History:   Procedure Laterality Date    Colonoscopy  2008    Colonoscopy  6/2016    Normal, recheck 10 yrs    Hernia surgery       inguinal    Other surgical history  2016    Robotic Assisted Lap Prostatectomy/Pelvic lymphadenoctomy    Other surgical history  2017    cyst excision left jaw    Other surgical history  2020    Cystoscopy under anesthesia, clot evacuation, bladder biopsy, fulguration, bilateral retrograde pyelography, fluoroscopic interpretation, 20 Amharic three-way catheter placement for continuous bladder irrigation.    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      Other surgical history  2022    Cystoscopy Dr. Peña    Skin surgery  10/2019    skin lesion biopsy x 2, benign    Tonsillectomy      Vasectomy      Yag iridotomy - ou - both eyes  2019         Social History     Socioeconomic History    Marital status:    Tobacco Use    Smoking status: Former     Current packs/day: 0.00     Average packs/day: 0.5 packs/day for 50.0 years (25.0 ttl pk-yrs)     Types: Cigarettes     Start date: 5/15/1965     Quit date: 5/15/2015     Years since quittin.1    Smokeless tobacco: Former     Quit date: 2015   Vaping Use    Vaping status: Never Used   Substance and Sexual Activity    Alcohol use: Yes     Alcohol/week: 3.0 standard drinks of alcohol     Types: 3 Cans of beer per week     Comment: per night    Drug use: Yes     Types: Cannabis     Comment: 1 x per week   Other Topics Concern    Seat Belt Yes                   REVIEW OF SYSTEMS:  GENERAL HEALTH: feels well otherwise, mood is good  SKIN: denies any unusual skin lesions or rashes  RESPIRATORY: As above   CARDIOVASCULAR: denies chest pain on exertion  GI: denies abdominal pain and denies heartburn  : Stable, on treatment for prostate CA.    NEURO: denies headaches, denies dizziness    EXAM:  /72   Pulse 80   Temp 97.2 °F (36.2 °C) (Temporal)   Resp 16   Ht 5' 5.9\" (1.674 m)   Wt 152 lb (68.9 kg)   SpO2 93%   BMI 24.61 kg/m²   Wt Readings from Last 6  Encounters:   05/15/24 152 lb (68.9 kg)   04/09/24 153 lb 8 oz (69.6 kg)   02/22/24 155 lb (70.3 kg)   01/18/24 149 lb 14.4 oz (68 kg)   01/10/24 156 lb (70.8 kg)   11/16/23 156 lb (70.8 kg)     GENERAL: well developed, well nourished,in no apparent distress, pleasant affect  Conj clear, EOMI, PERRL  TMs:clear bilat  OMM, throat clear  NECK: supple,no adenopathy,noTM  LUNGS: clear to auscultation, no wheezing, no crackles  CARDIO: RRR without murmur  EXTREMITIES: no cyanosis, clubbing or edema  Bilat knees with mild medial tenderness.  Full range of motion.  No swelling.  Negative anterior/posterior drawer signs  NEURO: A&O x3, no focal deficits  Normal gait    ASSESSMENT AND PLAN:    1. Essential hypertension  Blood pressure controlled.  Reviewed diet and exercise.  Recent electrolytes normal.  CPM  - losartan 50 MG Oral Tab; Take 1 tablet (50 mg total) by mouth daily.  Dispense: 90 tablet; Refill: 2    2. Acquired hypothyroidism  TSH normal.  Continue current levothyroxine dose.  Recheck labs in 6 months.  - levothyroxine 125 MCG Oral Tab; Take 1 tablet (125 mcg total) by mouth every morning.  Dispense: 90 tablet; Refill: 1  - TSH [899] [Q]; Future  - T4 FREE [866] [Q]; Future    3. Other hyperlipidemia  Reviewed diet and exercise.  Lipid panel with LDL at goal.  LFTs normal.  Cont statin at current dose  - Lipid Panel; Future    4. Prediabetes  A1c slightly elevated at 5.8, higher than previous.  Noted patient also on oral steroids now.  Reviewed diet and exercise.  Monitor.  - Hemoglobin A1C; Future  - BASIC METABOLIC PANEL [51900] [Q]; Future    5. Prostate cancer metastatic to bone (HCC)  Clinically doing well on treatment per oncology.  PSA has decreased.     6. Centrilobular emphysema (HCC)  Stable without any significant symptoms.    7. Bilat knee pain, chronic  Likely OA.  Knee brace as needed.  Tylenol as needed.  Okay to continue glucosamine supplement which has helped.  HEP.  Consider imaging if  symptoms persist or worsen.    8. Encounter for medication monitoring  - Lipid Panel; Future  - TSH [899] [Q]; Future  - T4 FREE [866] [Q]; Future

## 2024-06-13 RX ORDER — ATORVASTATIN CALCIUM 10 MG/1
10 TABLET, FILM COATED ORAL NIGHTLY
Qty: 90 TABLET | Refills: 2 | Status: SHIPPED | OUTPATIENT
Start: 2024-06-13

## 2024-06-13 NOTE — TELEPHONE ENCOUNTER
[facility].   Requested Prescriptions   Pending Prescriptions Disp Refills    ATORVASTATIN 10 MG Oral Tab [Pharmacy Med Name: Atorvastatin Calcium 10 Mg Tab Nort] 90 tablet 0     Sig: Take one tablet by mouth once daily at night.       Cholesterol Medication Protocol Passed - 6/10/2024  2:49 PM        Passed - ALT < 80     Lab Results   Component Value Date    ALT 26 04/09/2024             Passed - ALT resulted within past year        Passed - Lipid panel within past 12 months     Lab Results   Component Value Date    CHOLEST 204 (H) 05/08/2024    TRIG 74 05/08/2024     05/08/2024    LDL 68 05/08/2024    VLDL 11 04/12/2019    TCHDLRATIO 1.7 05/08/2024    NONHDLC 83 05/08/2024             Passed - In person appointment or virtual visit in the past 12 mos or appointment in next 3 mos     Recent Outpatient Visits              4 weeks ago Essential hypertension    [facility], [address] [place] [address] Keyla Pérez MD    Office Visit    2 months ago Prostate cancer ([facility])    [facility]    Office Visit    2 months ago On androgen ablation therapy    [facility]    [facility]    3 months ago Prostate cancer metastatic to bone ([facility])    [facility], Alejandro Caba MD    Office Visit    4 months ago Prostate cancer metastatic to bone ([facility])    [facility]    Nurse Only          Future Appointments         Provider Department Appt Notes    In 4 weeks Alejandro Pickett MD [facility] md amelie, inj trelstar, inf zometa    In 4 weeks [facility] [place] RN3 [facility] md amelie, inj trelstar, inf zometa    In 5 months Fadi Joseph MD [facility], [address] [place] [address] 6 month f/u as per                         Recent Outpatient Visits               4 weeks ago Essential hypertension    [facility], [address] [place] [address] Keyla Pérez MD    Office Visit    2 months ago Prostate cancer ([facility])    [facility]    Office Visit    2 months ago On androgen ablation therapy    [facility], Alejandro Caba MD    Office Visit    3 months ago Prostate cancer metastatic to bone ([facility])    [facility], Alejandro Caba MD    Office Visit    4 months ago Prostate cancer metastatic to bone ([facility])    [facility]    Nurse Only           Future Appointments         Provider Department Appt Notes    In 4 weeks Alejandro Pickett MD [facility] md amelie, inj trelstar, inf zometa    In 4 weeks [facility] [place] [facility], inf zometa    In 5 months Fadi Joseph MD [facility], [address] [place] [address] 6 month f/u as per

## 2024-07-10 NOTE — PROGRESS NOTES
[facility] Progress Note      Patient Name: Samuel Romero   YOB: 1945  Medical Record Number: [id number]  Attending Physician: Alejandro Pickett M.D.     The 21st Century Cures Act makes medical notes like these available to patients in the interest of transparency. Please be advised this is a medical document. Medical documents are intended to carry relevant information, facts as evident, and the clinical opinion of the practitioner. The medical note is intended as peer to peer communication and may appear blunt or direct. It is written in medical language and may contain abbreviations or verbiage that are unfamiliar.     Date of Visit: 7/11/2024       Chief Complaint  Prostate cancer - follow up.     Oncologic History  Samuel Romero is a 78 year old male referred for thrombocytosis. A review of the patient's laboratory studies in the Epic system show a normal platelet count of 308 in 04/2014. By 05/2015 the platelet count had increased to 528 K/mcl. Over time it continued to increase to its most recent value of 911 K/mcl on 10/11/2016. Patient has a history of prostate cancer for which he underwent prostatectomy in 02/2016 followed by external beam radiotherapy and short term androgen ablation. He had a colonoscopy in 2016 which was unremarkable.     Patient was empirically started on hydroxyurea while workup was initiated. Peripheral blood for JAK2, CALR, and MPL and BCR/ABL ultimately returned as negative. Bone marrow biopsy was performed but was not diagnostic. Repeat bone marrow biopsy was unremarkable.     It was then discovered that patient was drinking at least 6 cans of beer per day. He was asked to decrease his alcohol intake. With that his complete blood count normalized.     Routine follow up PSA on 09/30/2021 was 0.15 ng/ml as compared to 0.02 ng/ml on 03/09/2021. PSA was repeated on 01/10/2022 and had increased to 0.40 ng/ml. PSMA PET scan on 01/21/2022 showed  uptake in a solitary bone lesion involving the 10th right rib.     From 02/21/2022 to 02/25/2022 he received radiation therapy to the right rib lesion.    From 07/17/2023 to 07/21/2023 he received SBRT to a left rib lesion after an increase in PSA.    Genetic testing performed on 03/07/2022 showed no known pathogenic variants in 84 genes including: AIP, ALK, APC, COLTON, AXIN2, BAP1, BARD1, BLM, BMPR1A, BRCA1, BRCA2, BRIP1, CASR, CDC73, CDH1, CDK4, CDKN1B, CDKN1C, CDKN2A (p14ARF), CDKN2A (p.63YCD5s), CEBPA, CHEK2, DICER1, DIS3L2, EGFR (sequencing only), FH, FLCN, GATA2, GPC3, GREM1 (promoter region deletion/duplication testing only), HOXB13 (sequencing only), HRAS, KIT,MAX,  MEN1, MET, MITF (sequencing only), MLH1, MSH2 (including EPCAM rearrangement testing), MSH6, MUTYH, NBN, NF1, NF2, PALB2, PDGRFA, PHOX2B, PMS2, POLD1, POLE, POT1, PQMZD9X, PTCH1, PTEN, RAD50, RAD51C, RAD51D, RB1, RECQL4, RET, RUNX1, SDHAF2, SDHA (sequencing only), SDHB, SDHC, SDHD, SMAD4, SMARCA4, SMARCB1, SMARCE1, STK11, SUFU, TERC, TERT, YFDC447, TP53, TSC1, TSC2, VHL, WRN, and WT1.  Four variants of uncertain significance, FLCN c.850G>A (p.Efq038Tdt)(possibly mosaic), MSH6 c.1685A>G (p.Ttp985Tce), PALB2 c.37G>A (p.Ihu52Gkl), and WRN c.1531G>C (p.Mag382Jqw), were identified.      On 09/2021 he was found to have new increase in PSA. In 02/2022, he underwent radiation therapy to a right rib lesion as the only site identified on a PSMA PET scan. Following radiation, however, his PSA did not decrease. In 07/2023 he underwent SBRT to a left rib lesion.     In 11/2023, a follow up PSMA PET scan was ordered but patient did not schedule. Instead, he sought an opinion at [facility] where a PSMA PET scan was ordered. This was performed on 01/08/2024 which showed multiple bone metastases.     PSA on 01/18/2024 was 8.43 ng/ml.     On 01/18/2024 he began systemic therapy with androgen ablation with triptorelin and soon after abiraterone/prednisone were  added.     History of Present Illness  Patient returns for follow up. He has no complaints. He reports mild hot flashes. No change in mood. He is taking abiraterone with a low fat meal. Patient had 4 teeth extracted and a bridge placed in 05/2024.    Performance Status   Karnofsky 100% - Normal, no complaints.    Past Medical History (historical data, reviewed by physician)  Essential hypertension; hypothyroidism; hyperlipidemia; prostate cancer ([place] 4+5=9, [id number]) s/p prostatectomy and radiation therapy and short term androgen ablation; COVID 19; non-invasive low grade papillary urothelial carcinoma; paraganglioma.     Past Surgical History (historical data, reviewed by physician)  Tonsillectomy; vasectomy; robotic assisted laparoscopic prostatectomy and pelvic lymphadenectomy; inguinal hernia repair; TURBT; left neck paraganglioma.     Family History (historical data, reviewed by physician)  Sister with breast cancer; sister with colon cancer; father with prostate cancer.    Social History (historical data, reviewed by physician)  Previous tobacco use but quit 2015; uses alcohol daily.      Current Medications    atorvastatin 10 MG Oral Tab Take one tablet by mouth once daily at night. 90 tablet 2    Glucos-Chondroit-Hyaluron-MSM (GLUCOSAMINE CHONDROITIN JOINT OR) Take by mouth daily.      losartan 50 MG Oral Tab Take 1 tablet (50 mg total) by mouth daily. 90 tablet 2    levothyroxine 125 MCG Oral Tab Take 1 tablet (125 mcg total) by mouth every morning. 90 tablet 1    Abiraterone Acetate 250 MG Oral Tab Take 250 mg by mouth daily. Take with a low fat meal 30 tablet 11    predniSONE 10 MG Oral Tab Take 1 tablet (10 mg total) by mouth daily. 30 tablet 11    ASPIRIN 81 OR Take 81 mg by mouth daily.      Cholecalciferol (VITAMIN D3) 1000 UNITS Oral Cap Take 1 tablet by mouth daily.       Allergies   Mr. Romero has No Known Allergies.    Vital Signs   BP (!) 160/92 (BP Location: Left arm, Patient Position:  Sitting, Cuff Size: adult)   Pulse 77   Temp 97 °F (36.1 °C) (Tympanic)   Resp 18   Ht 1.676 m (5' 5.98\")   Wt 68.5 kg (151 lb)   SpO2 90%   BMI 24.38 kg/m²     Physical Examination   Constitutional Well developed, well nourished. Appears close to chronological age. No apparent distress.   Head  Normocephalic and atraumatic.  Eyes                  Conjunctiva clear; sclera anicteric.  ENMT  External nose normal; external ears normal.  Neck  Supple; no masses.   Lymphatics No cervical, supraclavicular, axillary adenopathy.  Respiratory Normal effort; no respiratory distress; clear to auscultation bilaterally.  Cardiovascular Regular rate and rhythm; normal S1S2.  Abdomen Soft; not tender; no masses; no hepatosplenomegaly.   Extremities No lower extremity edema.   Neurologic Motor and sensory grossly intact.  Psychiatric Mood and affect appropriate.    Laboratory  Recent Results (from the past 168 hour(s))   COMP METABOLIC PANEL [E]    Collection Time: 07/11/24 10:10 AM   Result Value Ref Range    Glucose 93 70 - 99 mg/dL    Sodium 143 136 - 145 mmol/L    Potassium 4.2 3.5 - 5.1 mmol/L    Chloride 107 98 - 112 mmol/L    CO2 29.0 21.0 - 32.0 mmol/L    Anion Gap 7 0 - 18 mmol/L    BUN 11 9 - 23 mg/dL    Creatinine 0.88 0.70 - 1.30 mg/dL    Calcium, Total 10.2 (H) 8.5 - 10.1 mg/dL    Calculated Osmolality 295 275 - 295 mOsm/kg    eGFR-Cr 89 >=60 mL/min/1.73m2    AST 10 (L) 15 - 37 U/L    ALT 26 16 - 61 U/L    Alkaline Phosphatase 84 45 - 117 U/L    Bilirubin, Total 0.8 0.1 - 2.0 mg/dL    Total Protein 6.9 6.4 - 8.2 g/dL    Albumin 3.8 3.4 - 5.0 g/dL    Globulin  3.1 2.8 - 4.4 g/dL    A/G Ratio 1.2 1.0 - 2.0    Patient Fasting for CMP? Patient not present    PSA - DIAGNOSTIC [E]    Collection Time: 07/11/24 10:10 AM   Result Value Ref Range    PSA 0.04 <=4.00 ng/mL   CBC W/ DIFFERENTIAL    Collection Time: 07/11/24 10:10 AM   Result Value Ref Range    WBC 6.8 4.0 - 11.0 x10(3) uL    RBC 4.37 3.80 - 5.80 x10(6)uL     HGB 15.0 13.0 - 17.5 g/dL    HCT 43.8 39.0 - 53.0 %    .0 150.0 - 450.0 10(3)uL    .2 (H) 80.0 - 100.0 fL    MCH 34.3 (H) 26.0 - 34.0 pg    MCHC 34.2 31.0 - 37.0 g/dL    RDW 12.3 %    Neutrophil Absolute Prelim 4.65 1.50 - 7.70 x10 (3) uL     Impression and Plan   1.   Prostate cancer, metastatic: PSA has decreased further. He is tolerating therapy well. Continue combined androgen ablation with triptorelin and abiraterone/prednisone. Triptorelin today.     2.   Bone metastasis: Plan to start zoledronic acid at next visit now that dental work has been completed.     3.   Decreased oxygen saturation: Patient advised to follow up with PCP or pulmonology.     Patient will continue to receive longitudinal care by me for the complex care required for the cancer diagnosis including the expected complications related to anticancer therapy.     Planned Follow Up   Patient will return for follow up and therapy in 3 months.    Electronically signed by:    Alejandro Pickett M.D.  System Medical Director of Oncology Services  [facility]

## 2024-07-11 ENCOUNTER — OFFICE VISIT (OUTPATIENT)
Dept: HEMATOLOGY/ONCOLOGY | Facility: HOSPITAL | Age: 79
End: 2024-07-11
Attending: INTERNAL MEDICINE
Payer: MEDICARE

## 2024-07-11 VITALS
RESPIRATION RATE: 18 BRPM | OXYGEN SATURATION: 90 % | SYSTOLIC BLOOD PRESSURE: 160 MMHG | DIASTOLIC BLOOD PRESSURE: 92 MMHG | HEART RATE: 77 BPM | HEIGHT: 65.98 IN | TEMPERATURE: 97 F | BODY MASS INDEX: 24.27 KG/M2 | WEIGHT: 151 LBS

## 2024-07-11 DIAGNOSIS — Z79.899 ON ANDROGEN ABLATION THERAPY: ICD-10-CM

## 2024-07-11 DIAGNOSIS — C61 PROSTATE CANCER METASTATIC TO BONE (HCC): ICD-10-CM

## 2024-07-11 DIAGNOSIS — C79.51 PROSTATE CANCER METASTATIC TO BONE (HCC): ICD-10-CM

## 2024-07-11 DIAGNOSIS — C61 PROSTATE CANCER (HCC): Primary | ICD-10-CM

## 2024-07-11 DIAGNOSIS — C61 PROSTATE CANCER METASTATIC TO BONE (HCC): Primary | ICD-10-CM

## 2024-07-11 DIAGNOSIS — C79.51 PROSTATE CANCER METASTATIC TO BONE (HCC): Primary | ICD-10-CM

## 2024-07-11 LAB
ALBUMIN SERPL-MCNC: 3.8 G/DL (ref 3.4–5)
ALBUMIN/GLOB SERPL: 1.2 {RATIO} (ref 1–2)
ALP LIVER SERPL-CCNC: 84 U/L
ALT SERPL-CCNC: 26 U/L
ANION GAP SERPL CALC-SCNC: 7 MMOL/L (ref 0–18)
AST SERPL-CCNC: 10 U/L (ref 15–37)
BASOPHILS # BLD: 0 X10(3) UL (ref 0–0.2)
BASOPHILS NFR BLD: 0 %
BILIRUB SERPL-MCNC: 0.8 MG/DL (ref 0.1–2)
BUN BLD-MCNC: 11 MG/DL (ref 9–23)
CALCIUM BLD-MCNC: 10.2 MG/DL (ref 8.5–10.1)
CHLORIDE SERPL-SCNC: 107 MMOL/L (ref 98–112)
CO2 SERPL-SCNC: 29 MMOL/L (ref 21–32)
CREAT BLD-MCNC: 0.88 MG/DL
EGFRCR SERPLBLD CKD-EPI 2021: 89 ML/MIN/1.73M2 (ref 60–?)
EOSINOPHIL # BLD: 0 X10(3) UL (ref 0–0.7)
EOSINOPHIL NFR BLD: 0 %
ERYTHROCYTE [DISTWIDTH] IN BLOOD BY AUTOMATED COUNT: 12.3 %
GLOBULIN PLAS-MCNC: 3.1 G/DL (ref 2.8–4.4)
GLUCOSE BLD-MCNC: 93 MG/DL (ref 70–99)
HCT VFR BLD AUTO: 43.8 %
HGB BLD-MCNC: 15 G/DL
LYMPHOCYTES NFR BLD: 1.02 X10(3) UL (ref 1–4)
LYMPHOCYTES NFR BLD: 15 %
MCH RBC QN AUTO: 34.3 PG (ref 26–34)
MCHC RBC AUTO-ENTMCNC: 34.2 G/DL (ref 31–37)
MCV RBC AUTO: 100.2 FL
METAMYELOCYTES # BLD: 0.2 X10(3) UL
METAMYELOCYTES NFR BLD: 3 %
MONOCYTES # BLD: 0.34 X10(3) UL (ref 0.1–1)
MONOCYTES NFR BLD: 5 %
MORPHOLOGY: NORMAL
MYELOCYTES # BLD: 0.14 X10(3) UL
MYELOCYTES NFR BLD: 2 %
NEUTROPHILS # BLD AUTO: 4.65 X10 (3) UL (ref 1.5–7.7)
NEUTROPHILS NFR BLD: 74 %
NEUTS BAND NFR BLD: 1 %
NEUTS HYPERSEG # BLD: 5.1 X10(3) UL (ref 1.5–7.7)
OSMOLALITY SERPL CALC.SUM OF ELEC: 295 MOSM/KG (ref 275–295)
PLATELET # BLD AUTO: 161 10(3)UL (ref 150–450)
PLATELET MORPHOLOGY: NORMAL
POTASSIUM SERPL-SCNC: 4.2 MMOL/L (ref 3.5–5.1)
PROT SERPL-MCNC: 6.9 G/DL (ref 6.4–8.2)
PSA SERPL-MCNC: 0.04 NG/ML (ref ?–4)
RBC # BLD AUTO: 4.37 X10(6)UL
SODIUM SERPL-SCNC: 143 MMOL/L (ref 136–145)
TOTAL CELLS COUNTED BLD: 100
WBC # BLD AUTO: 6.8 X10(3) UL (ref 4–11)

## 2024-07-11 PROCEDURE — 99214 OFFICE O/P EST MOD 30 MIN: CPT | Performed by: SPECIALIST

## 2024-07-11 PROCEDURE — 96402 CHEMO HORMON ANTINEOPL SQ/IM: CPT

## 2024-07-11 PROCEDURE — G2211 COMPLEX E/M VISIT ADD ON: HCPCS | Performed by: SPECIALIST

## 2024-07-11 NOTE — PROGRESS NOTES
Patient is here for MD f/u. Due for Trelstar and Zometa today. He continues on Abiraterone/Prednisone. Tolerating well. No GI complaints. Appetite and energy level are good. Brown spots on right forearm, some areas are raised. No drainage or pruritus.     Education Record    Learner:  Patient and Spouse    Disease / Diagnosis:  Prostate cancer     Barriers / Limitations:  None   Comments:    Method:  Discussion   Comments:    General Topics:  Plan of care reviewed   Comments:    Outcome:  Shows understanding   Comments:

## 2024-07-11 NOTE — PROGRESS NOTES
Education Record    Learner:  Patient    Disease / Diagnosis: Here for Trelstar injection.     Barriers / Limitations:  None    Method:  Brief focused, printed material and  reinforcement    General Topics:  Plan of care reviewed    Outcome:  Shows understanding. No zometa today. Trelstar given. Pt left in stable condition. Appts in 3 months in place.

## 2024-07-16 ENCOUNTER — HOSPITAL ENCOUNTER (EMERGENCY)
Age: 79
Discharge: HOME | End: 2024-07-16
Payer: MEDICARE

## 2024-07-16 ENCOUNTER — NURSE TRIAGE (OUTPATIENT)
Dept: INTERNAL MEDICINE CLINIC | Facility: CLINIC | Age: 79
End: 2024-07-16

## 2024-07-16 NOTE — TELEPHONE ENCOUNTER
Action Requested: Summary for Provider     []  Critical Lab, Recommendations Needed  [x] Need Additional Advice  []   FYI    []   Need Orders  [] Need Medications Sent to Pharmacy  []  Other     SUMMARY: Pt's wife Catalina(ok per HIPAA) called. She states pt had OV with oncologist on 7/11/24. His O2 sat at that time was 89%. He was instructed to follow up with PCP or pulmonologist. She states he has had a cough and shortness of breath with exertion \"for a while now\" which Dr. Pérez is aware of. Earliest appt available is Friday.    Dr. Pérez - would you like to see pt or send to ?    Reason for call: Shortness Of Breath  Onset: unknown  LOV: 5/15/24               O2 sats 89% at onc visit on 7/11/24  Has also had cough - Dr. Pérez aware of cough  Unsure how long sob/cough have been going on for    Reason for Disposition   MILD difficulty breathing (e.g., minimal/no SOB at rest, SOB with walking, pulse < 100) and new-onset    Protocols used: Oxygen Monitoring and [id number]

## 2024-07-16 NOTE — TELEPHONE ENCOUNTER
O2 Sat is baseline low 90s, h/o COPD  No available appt's this week  Pls have pt go to [facility] today for eval and may need lung imaging.  Prefer pt not wait until next available appt.

## 2024-07-16 NOTE — TELEPHONE ENCOUNTER
Called pt's wife Catalina. Relayed Dr. Pérez's recommendation for pt to be seen at  for evaluation. She verbalized understanding and is agreeable to plan.

## 2024-07-18 ENCOUNTER — APPOINTMENT (OUTPATIENT)
Dept: CT IMAGING | Age: 79
End: 2024-07-18
Attending: EMERGENCY MEDICINE
Payer: MEDICARE

## 2024-07-18 ENCOUNTER — HOSPITAL ENCOUNTER (EMERGENCY)
Age: 79
Discharge: HOME | End: 2024-07-18
Attending: EMERGENCY MEDICINE
Payer: MEDICARE

## 2024-07-18 VITALS
HEART RATE: 80 BPM | TEMPERATURE: 98 F | OXYGEN SATURATION: 94 % | WEIGHT: 152 LBS | SYSTOLIC BLOOD PRESSURE: 136 MMHG | RESPIRATION RATE: 16 BRPM | HEIGHT: 68 IN | BODY MASS INDEX: 23.04 KG/M2 | DIASTOLIC BLOOD PRESSURE: 83 MMHG

## 2024-07-18 DIAGNOSIS — R06.00 DYSPNEA, UNSPECIFIED TYPE: Primary | ICD-10-CM

## 2024-07-18 LAB
ALBUMIN SERPL-MCNC: 3.9 G/DL (ref 3.4–5)
ALBUMIN/GLOB SERPL: 1.2 {RATIO} (ref 1–2)
ALP LIVER SERPL-CCNC: 83 U/L
ALT SERPL-CCNC: 26 U/L
ANION GAP SERPL CALC-SCNC: 1 MMOL/L (ref 0–18)
AST SERPL-CCNC: 13 U/L (ref 15–37)
ATRIAL RATE: 86 BPM
BASOPHILS # BLD AUTO: 0.01 X10(3) UL (ref 0–0.2)
BASOPHILS NFR BLD AUTO: 0.2 %
BILIRUB SERPL-MCNC: 0.9 MG/DL (ref 0.1–2)
BUN BLD-MCNC: 14 MG/DL (ref 9–23)
CALCIUM BLD-MCNC: 10 MG/DL (ref 8.5–10.1)
CHLORIDE SERPL-SCNC: 105 MMOL/L (ref 98–112)
CO2 SERPL-SCNC: 32 MMOL/L (ref 21–32)
CREAT BLD-MCNC: 0.81 MG/DL
EGFRCR SERPLBLD CKD-EPI 2021: 91 ML/MIN/1.73M2 (ref 60–?)
EOSINOPHIL # BLD AUTO: 0.03 X10(3) UL (ref 0–0.7)
EOSINOPHIL NFR BLD AUTO: 0.5 %
ERYTHROCYTE [DISTWIDTH] IN BLOOD BY AUTOMATED COUNT: 12.5 %
GLOBULIN PLAS-MCNC: 3.3 G/DL (ref 2.8–4.4)
GLUCOSE BLD-MCNC: 149 MG/DL (ref 70–99)
HCT VFR BLD AUTO: 45.9 %
HGB BLD-MCNC: 15.4 G/DL
IMM GRANULOCYTES # BLD AUTO: 0.27 X10(3) UL (ref 0–1)
IMM GRANULOCYTES NFR BLD: 4.4 %
LYMPHOCYTES # BLD AUTO: 0.65 X10(3) UL (ref 1–4)
LYMPHOCYTES NFR BLD AUTO: 10.7 %
MCH RBC QN AUTO: 34 PG (ref 26–34)
MCHC RBC AUTO-ENTMCNC: 33.6 G/DL (ref 31–37)
MCV RBC AUTO: 101.3 FL
MONOCYTES # BLD AUTO: 0.54 X10(3) UL (ref 0.1–1)
MONOCYTES NFR BLD AUTO: 8.9 %
NEUTROPHILS # BLD AUTO: 4.58 X10 (3) UL (ref 1.5–7.7)
NEUTROPHILS # BLD AUTO: 4.58 X10(3) UL (ref 1.5–7.7)
NEUTROPHILS NFR BLD AUTO: 75.3 %
OSMOLALITY SERPL CALC.SUM OF ELEC: 289 MOSM/KG (ref 275–295)
P AXIS: 75 DEGREES
P-R INTERVAL: 124 MS
PLATELET # BLD AUTO: 166 10(3)UL (ref 150–450)
POTASSIUM SERPL-SCNC: 4.3 MMOL/L (ref 3.5–5.1)
PROT SERPL-MCNC: 7.2 G/DL (ref 6.4–8.2)
Q-T INTERVAL: 342 MS
QRS DURATION: 88 MS
QTC CALCULATION (BEZET): 409 MS
R AXIS: -13 DEGREES
RBC # BLD AUTO: 4.53 X10(6)UL
SODIUM SERPL-SCNC: 138 MMOL/L (ref 136–145)
T AXIS: 55 DEGREES
TROPONIN I SERPL HS-MCNC: 9 NG/L
VENTRICULAR RATE: 86 BPM
WBC # BLD AUTO: 6.1 X10(3) UL (ref 4–11)

## 2024-07-18 PROCEDURE — 93010 ELECTROCARDIOGRAM REPORT: CPT

## 2024-07-18 PROCEDURE — 36415 COLL VENOUS BLD VENIPUNCTURE: CPT

## 2024-07-18 PROCEDURE — 84484 ASSAY OF TROPONIN QUANT: CPT | Performed by: EMERGENCY MEDICINE

## 2024-07-18 PROCEDURE — 93005 ELECTROCARDIOGRAM TRACING: CPT

## 2024-07-18 PROCEDURE — 94640 AIRWAY INHALATION TREATMENT: CPT

## 2024-07-18 PROCEDURE — 71275 CT ANGIOGRAPHY CHEST: CPT | Performed by: EMERGENCY MEDICINE

## 2024-07-18 PROCEDURE — 80053 COMPREHEN METABOLIC PANEL: CPT | Performed by: EMERGENCY MEDICINE

## 2024-07-18 PROCEDURE — 99284 EMERGENCY DEPT VISIT MOD MDM: CPT

## 2024-07-18 PROCEDURE — 85025 COMPLETE CBC W/AUTO DIFF WBC: CPT | Performed by: EMERGENCY MEDICINE

## 2024-07-18 PROCEDURE — 99285 EMERGENCY DEPT VISIT HI MDM: CPT

## 2024-07-18 RX ORDER — ALBUTEROL SULFATE 90 UG/1
2 AEROSOL, METERED RESPIRATORY (INHALATION) EVERY 4 HOURS PRN
Qty: 1 EACH | Refills: 0 | Status: SHIPPED | OUTPATIENT
Start: 2024-07-18 | End: 2024-08-17

## 2024-07-18 RX ORDER — ALBUTEROL SULFATE 90 UG/1
2 AEROSOL, METERED RESPIRATORY (INHALATION) ONCE
Status: COMPLETED | OUTPATIENT
Start: 2024-07-18 | End: 2024-07-18

## 2024-07-18 NOTE — ED INITIAL ASSESSMENT (HPI)
Saw dr fisher last Wednesday, had low o2 sat in office. They wanted an evaluation. Denies SOB, SANCHO. Denies cough

## 2024-07-18 NOTE — DISCHARGE INSTRUCTIONS
Follow-up with your primary care physician, oncologist return if any severe chest pain shortness of breath.    You were seen in the emergency room in a limited time.  There is a possibility that although we do not see any acute process at this present time that things can change with time.  Is therefore imperative that you follow-up with primary care physician for close follow-up.  If there is any significant progression of your pain  or other symptoms you to return immediately to the emergency room.

## 2024-07-18 NOTE — ED PROVIDER NOTES
Patient Seen in: [facility] In [place]      History     Chief Complaint   Patient presents with    Difficulty Breathing     Stated Complaint: hypoxia at oncologist, wants cxr    Subjective:   HPI    78-year-old male who arrives here with complaints shortness of breath does have history of prostate cancer mets to his ribs.  The patient arrives here with complaints of some mild shortness of breath he denies any chest pain, cough, fever he says the shortness of breath is very mild is usually activity related.  He did have a slightly low oxygen level.  The patient was told for further evaluation to go to the emergency room.  Presently has no chest pain or shortness of breath no fevers he denies any dizziness patient does have a history of prostate cancer with mets.  COPD, hypertension hypothyroidism, superior mesenteric artery stenosis on the previous records.    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    Prostate cancer (HCC)    Superior mesenteric artery stenosis (HCC)              Past Surgical History:   Procedure Laterality Date    Colonoscopy  2008    Colonoscopy  6/2016    Normal, recheck 10 yrs    Hernia surgery  2013    inguinal    Other surgical history  02/2016    Robotic Assisted Lap Prostatectomy/Pelvic lymphadenoctomy    Other surgical history  1/31/2017    cyst excision left jaw    Other surgical history  12/2020    Cystoscopy under anesthesia, clot evacuation, bladder biopsy, fulguration, bilateral retrograde pyelography, fluoroscopic interpretation, 20 Macedonian three-way catheter placement for continuous bladder irrigation.    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      Other surgical history  2022    Cystoscopy Dr. Peña    Skin surgery  10/2019    skin lesion biopsy x 2, benign    Tonsillectomy      Vasectomy      Yag iridotomy - ou - both eyes  2019                Social History     Socioeconomic History    Marital status:    Tobacco Use    Smoking status: Former     Current packs/day: 0.00     Average packs/day: 0.5 packs/day for 50.0 years (25.0 ttl pk-yrs)     Types: Cigarettes     Start date: 5/15/1965     Quit date: 5/15/2015     Years since quittin.1    Smokeless tobacco: Former     Quit date: 2015   Vaping Use    Vaping status: Never Used   Substance and Sexual Activity    Alcohol use: Yes     Alcohol/week: 3.0 standard drinks of alcohol     Types: 3 Cans of beer per week     Comment: per night    Drug use: Yes     Types: Cannabis     Comment: 1 x per week   Other Topics Concern    Seat Belt Yes              Review of Systems    Positive for stated Chief Complaint: Difficulty Breathing    Other systems are as noted in HPI.  Constitutional and vital signs reviewed.      All other systems reviewed and negative except as noted above.    Physical Exam     ED Triage Vitals [24 1018]   /86   Pulse 86   Resp 18   Temp 98.2 °F (36.8 °C)   Temp src Oral   SpO2 93 %   O2 Device None (Room air)       Current Vitals:   Vital Signs  BP: 136/83  Pulse: 80  Resp: 16  Temp: 98.4 °F (36.9 °C)  Temp src: Temporal    Oxygen Therapy  SpO2: 94 %  O2 Device: None (Room air)            Physical Exam    General: .  Patient is a pleasant male he does not look in any respiratory distress at this present time.  The patient is in no respiratory distress    HEENT: Atraumatic, conjunctiva are not pale.  There is no icterus.  Oral mucosa Is wet.  No facial trauma.  The neck is supple.    LUNGS: Clear to auscultation, there is no wheezing or retraction.  No crackles.    CV: Cardiovascular is regular  without murmurs or rubs.    ABD: The abdomen is soft nondistended nontender.  There is no rebound.  There is no guarding.    EXT: There is good pulses bilaterally.  There is no calf tenderness.  There is no rash noted.  There is no edema    NEURO: Alert and oriented x4.  Muscle strength and sensory exam is grossly normal.  And the patient is neurologically intact with no focal findings.      ED Course     Labs Reviewed   COMP METABOLIC PANEL (14) - Abnormal; Notable for the following components:       Result Value    Glucose 149 (*)     AST 13 (*)     All other components within normal limits   CBC W/ DIFFERENTIAL - Abnormal; Notable for the following components:    .3 (*)     Lymphocyte Absolute 0.65 (*)     All other components within normal limits   TROPONIN I HIGH SENSITIVITY - Normal   CBC WITH DIFFERENTIAL WITH PLATELET    Narrative:     The following orders were created for panel order CBC With Differential With Platelet.  Procedure                               Abnormality         Status                     ---------                               -----------         ------                     CBC W/ DIFFERENTIAL[[id number]]          Abnormal            Final result                 Please view results for these tests on the individual orders.   RAINBOW DRAW BLUE     \          The patient was placed on monitors, IV was started, blood was drawn.     It was done to rule out pneumonia, pneumothorax, pulmonary embolism    MDM      The EKG shows normal sinus rhythm.  There is no acute ST elevations or ischemic findings.  The rest of the EKG including rate rhythm axis and intervals I agree with the EKG report . The rate is 86.  There is no significant changes compared to an old EKG      The patient's troponin is negative comprehensive was normal CBC was normal.        I personally reviewed the radiographs and my individual interpretation shows    No obvious pulmonary embolism seen    Also reviewed official  report and it shows      CT ANGIOGRAPHY, CHEST (CPT=71275)    Result Date: 7/18/2024  PROCEDURE:  CT ANGIOGRAPHY, CHEST (CPT=71275)  COMPARISON:  [facility], CT, CT ANGIOGRAPHY, CHEST (CPT=71275), 11/16/2020, 12:19 PM.  INDICATIONS:  hypoxia at oncologist, wants cxr  TECHNIQUE:  IV contrast-enhanced multislice CT angiography is performed through the pulmonary arterial anatomy. 3D volume renderings are generated.  Dose reduction techniques were used. Dose information is transmitted to the ACR (American College of Radiology) NRDR (National Radiology Data Registry) which includes the Dose Index Registry.  PATIENT STATED HISTORY:(As transcribed by Technologist)  Patient experienced low O2 levels at Oncologist office.   CONTRAST USED:  100cc of Isovue 370  FINDINGS:  LUNGS:  Extensive emphysematous changes.  Scattered atelectasis and/or scarring.  Fibrotic changes cannot be excluded. VASCULATURE:  Unremarkable. THORACIC AORTA:  Scattered atherosclerosis. MEDIASTINUM/CAROLINE:  Unremarkable. CARDIAC:  Unremarkable. PLEURA:  Unremarkable. CHEST WALL:  Unremarkable. LIMITED ABDOMEN:  A chronic appearing dissection is again noted at the superior mesenteric artery.  Please refer to the prior CTA abdomen for further details.  The SMA is again mildly dilated measuring 12 mm in caliber, not significantly changed since 11/16/2020.  This is consistent with a persistent pseudoaneurysm. BONES:  Degenerative changes in the spine. OTHER:  None.            CONCLUSION:   1. No acute pulmonary embolism.  2. Extensive emphysematous changes.  3. 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  Please refer to the prior CTA abdomen for further details.   LOCATION:  [facility]   Dictated by (CST): Stromberg, LeRoy, MD on 7/18/2024 at 12:01 PM     Finalized by (CST): Stromberg, LeRoy, MD on 7/18/2024 at 12:12 PM             The patient's troponin is negative white count was normal, comprehensive was normal.  The  patient was taught how to use an inhaler.  I recommended that patient go closely follow-up with the primary care physician...  He was evaluated using inhaler patient felt much better on repeat exam.  Patient was comfortable going home.  Patient was discharged home.    I discussed with the patient that were seeing them  in a short period of time.  I discussed with them that there is always a possibility that things can change and a need reevaluation with their primary care physician as soon as possible.  I've also discussed with them that if the pain gets worse to return to the emergency room immediately.            Medical Decision Making      Disposition and Plan     Clinical Impression:  1. Dyspnea, unspecified type         Disposition:  Discharge  7/18/2024 12:18 pm    Follow-up:  Keyla Pérez MD  [address] [address]  [phone]    Follow up in 2 day(s)            Medications Prescribed:  Discharge Medication List as of 7/18/2024 12:38 PM        START taking these medications    Details   albuterol 108 (90 Base) MCG/ACT Inhalation Aero Soln Inhale 2 puffs into the lungs every 4 (four) hours as needed for Wheezing., Normal, Disp-1 each, R-0

## 2024-07-18 NOTE — TELEPHONE ENCOUNTER
Noted pt was not seen in UC.   Pls triage to make sure pt is doing ok  If pt resistant to UC visit then can add to my schedule for tim , double book at end of my day when wife also has appt.

## 2024-07-18 NOTE — TELEPHONE ENCOUNTER
Called and spoke w/ pt and pt's wife Catalina. I asked how pt was feeling as we see he was not evaluated in UC. They stated they went to [facility] and they didn't have the imaging order so they left. I notified that we can't place imaging order to UC or ER and we wanted him to go there for eval where the providers would determine if imaging is necessary or not. Catalina verbalizes understanding and stated they will return to [facility] today for eval.     KARMEN Pérez

## 2024-08-19 ENCOUNTER — APPOINTMENT (OUTPATIENT)
Dept: GENERAL RADIOLOGY | Age: 79
End: 2024-08-19
Payer: MEDICARE

## 2024-08-19 ENCOUNTER — TELEPHONE (OUTPATIENT)
Dept: HEMATOLOGY/ONCOLOGY | Facility: HOSPITAL | Age: 79
End: 2024-08-19

## 2024-08-19 ENCOUNTER — HOSPITAL ENCOUNTER (EMERGENCY)
Age: 79
Discharge: HOME | End: 2024-08-19
Payer: MEDICARE

## 2024-08-19 VITALS
OXYGEN SATURATION: 94 % | SYSTOLIC BLOOD PRESSURE: 155 MMHG | DIASTOLIC BLOOD PRESSURE: 99 MMHG | RESPIRATION RATE: 18 BRPM | WEIGHT: 152 LBS | BODY MASS INDEX: 23 KG/M2 | TEMPERATURE: 97 F | HEART RATE: 72 BPM

## 2024-08-19 DIAGNOSIS — S33.5XXA LUMBAR SPRAIN, INITIAL ENCOUNTER: Primary | ICD-10-CM

## 2024-08-19 PROCEDURE — 99284 EMERGENCY DEPT VISIT MOD MDM: CPT

## 2024-08-19 PROCEDURE — 72100 X-RAY EXAM L-S SPINE 2/3 VWS: CPT

## 2024-08-19 PROCEDURE — 99283 EMERGENCY DEPT VISIT LOW MDM: CPT

## 2024-08-19 RX ORDER — TRAMADOL HYDROCHLORIDE 50 MG/1
50 TABLET ORAL EVERY 8 HOURS PRN
Qty: 10 TABLET | Refills: 0 | Status: SHIPPED | OUTPATIENT
Start: 2024-08-19 | End: 2024-08-24

## 2024-08-19 NOTE — TELEPHONE ENCOUNTER
7/11/24 - [facility] - [facility].    Patient was lifting some building materials last week and started having back pain.  He has been trying heat and cold to area without much relief.  He has also taken some old Tramadol from a Dentist for pain(mostly at night) with pain of 10/10 at times.  He has trouble getting up after sitting and walking uncomfortable with tight back and knees bent due to pain.    Denies fevers or chills, denies sob or chest pain, denies n/v/d.  Appetite same. Denies lightheadedness or dizziness, no urinary or bowel complaints.  Denies numbness or tingling in back or legs.    Concerned if related to lifting of materials(has hx of previous \"bad back\" or connected to his cancer which he has in ribs/bones.    Requesting a call back with instructions.  Next follow up in October.

## 2024-08-19 NOTE — DISCHARGE INSTRUCTIONS
Take the tramadol at bedtime as needed for severe pain. Do not operate machinery or drive or consume alcohol while taking this medication. You may use ice or moist heat 10-15 minutes several times a day for comfort.  Ice through clothing or a towel to avoid frostbite. Do not sleep with a heating pad as this will make the spasm worse.  Change positions frequently throughout the day.  Avoid heavy lifting. Once your pain has improved, start stretching exercises. Go to the closest Emergency Department if you develop uncontrolled pain, spontaneous loss of urine or stool, numbness or weakness into your arms or legs. See your doctor in 3-5 days if not better.

## 2024-08-19 NOTE — TELEPHONE ENCOUNTER
Samuel has back pain for a week. He was lifting dry wall last week Tuesday. His wife says his cancer has spread to the bones and ribs. He has iced it and heat on it. He did take a pain pill today.  Thank you Bernice

## 2024-08-19 NOTE — ED PROVIDER NOTES
Patient Seen in: [facility] In [place]      History     Chief Complaint   Patient presents with    Back Pain     Stated Complaint: back pain    Subjective:   HPI    78-year-old male with known history of prostate cancer, COPD, hypertension hyperlipidemia and hypothyroidism comes in today complaint of axial low back pain.  Patient states that he called his oncologist and they recommended he be evaluated secondary to his known cancer history.  Patient denies bowel or bladder incontinence.  Denies numbness or tingling.  The pain is all in his axial low back.  He was lifting drywall and insulating his daughter described to last week.  The pain started after this and is worse on movement.  Denies radiating pain numbness tingling or pain down the legs.  Patient has a known history of chronic back pain.    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    Prostate cancer (HCC)    Superior mesenteric artery stenosis (HCC)              Past Surgical History:   Procedure Laterality Date    Colonoscopy  2008    Colonoscopy  6/2016    Normal, recheck 10 yrs    Hernia surgery  2013    inguinal    Other surgical history  02/2016    Robotic Assisted Lap Prostatectomy/Pelvic lymphadenoctomy    Other surgical history  1/31/2017    cyst excision left jaw    Other surgical history  12/2020    Cystoscopy under anesthesia, clot evacuation, bladder biopsy, fulguration, bilateral retrograde pyelography, fluoroscopic interpretation, 20 Greek three-way catheter placement for continuous bladder irrigation.    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      Other  surgical history  01/26/2022    Cystoscopy Dr. Peña    Skin surgery  10/2019    skin lesion biopsy x 2, benign    Tonsillectomy  1953    Vasectomy      Yag iridotomy - ou - both eyes  07/2019                No pertinent social history.            Review of Systems    Positive for stated Chief Complaint: Back Pain    Other systems are as noted in HPI.  Constitutional and vital signs reviewed.      All other systems reviewed and negative except as noted above.    Physical Exam     ED Triage Vitals [08/19/24 1329]   BP (!) 145/94   Pulse 83   Resp 16   Temp 97.4 °F (36.3 °C)   Temp src    SpO2 99 %   O2 Device None (Room air)       Current Vitals:   Vital Signs  BP: 145/87  Pulse: 74  Resp: 18  Temp: 97.4 °F (36.3 °C)    Oxygen Therapy  SpO2: 90 % (patient states this is his baseline)  O2 Device: None (Room air)            Physical Exam    General Appearance:  Alert, cooperative, no distress, appropriate for age  Head:  Normocephalic, without obvious abnormality  Neck:  Supple; symmetrical, trachea midline, no adenopathy  Lungs:  Clear to auscultation bilaterally, respirations unlabored   Heart:  Regular rate & rhythm, S1 and S2 normal, no murmurs, rubs, or gallops  Abdomen:  Soft, non-tender, bowel sounds active all four quadrants, no mass or organomegaly. No rebound tenderness or guarding  Lumbar:  Limited ROM secondary to pain, no bony ttp, + ttp over lumbar paraspinal muscles, negative SLR Bilaterally, 5/5 bilateral lower extremity motor strength exam, SILT, + 2/4 bilateral patella and achilles reflexes                Lymphatic:  No adenopathy  Skin/Hair/Nails:  Skin warm, dry and intact, no rashes or abnormal dyspigmentation  Neurologic:  Alert and oriented x3,  normal strength and tone, gait steady      ED Course   Labs Reviewed - No data to display  XR LUMBAR SPINE (MIN 2 VIEWS) (CPT=72100)    Result Date: 8/19/2024            PROCEDURE:  XR LUMBAR SPINE (MIN 2 VIEWS) (CPT=72100)  TECHNIQUE:  AP, lateral, and  coned down L5-S1 views were obtained.  COMPARISON:  None.  INDICATIONS:  back pain  PATIENT STATED HISTORY: (As transcribed by Technologist)  Pt has lower back pain. The pain is down the center of his lower back. Non radiating.  No injury. He has a hx of lower back pain in the past.     FINDINGS: Minimal dextroscoliosis lumbar spine. Vertebral body heights are maintained throughout the lumbar spine. Disc spaces are maintained throughout the lumbar spine. Marginal osteophytes are noted.  Facet hypertrophic changes are noted. IMPRESSION: Moderate osteoarthritic changes throughout the lumbar spine.   LOCATION:  [place] [address]   Dictated by (CST): Blu Johnson MD on 8/19/2024 at 2:45 PM     Finalized by (CST): Blu Johnson MD on 8/19/2024 at 2:45 PM               [facility]          Medical Decision Making  78-year-old male who comes in today complaining of axial low back pain that started after doing drywall and insulation in his daughter's car last week.  He notified his oncologist who recommended that he be evaluated to rule out that this is not related to his cancer.  Patient denies any weakness bowel or bladder incontinence numbness or tingling.    Problems Addressed:  Lumbar sprain, initial encounter: acute illness or injury    Amount and/or Complexity of Data Reviewed  External Data Reviewed:      Details: Personally reviewed the telephone encounter doctor and a valve wanted x-rays and evaluation to ensure this was not cancer related.  Radiology: ordered and independent interpretation performed. Decision-making details documented in ED Course.     Details: I personally reviewed the patient's x-ray there is no evidence of acute mass fracture or compression fracture in the lumbar spine  ECG/medicine tests: ordered and independent interpretation performed. Decision-making details documented in ED Course.     Details: Lidoderm patch    Risk  OTC drugs.  Prescription drug management.  Risk Details: Clinical Impression:  Lumbar muscular strain      The differential diagnosis before testing included sciatica, cauda equina, lumbar muscular strain, lumbar compression fracture which is a medical condition that poses a threat to life/function.     Discussed with the patient ice and moist heat to the area, we discussed anti-inflammatory medications and muscle relaxants; these medications can make you drowsy do not use these medications if you will be working or driving.  Discussed with the patient when to return to the emergency room including worsening pain, bowel and bladder changes or numbness and tingling to the perineal region    Continue tramadol as needed, tylenol             Disposition and Plan     Clinical Impression:  1. Lumbar sprain, initial encounter         Disposition:  Discharge  8/19/2024  4:21 pm    Follow-up:  Keyla Pérez MD  [address] [place] [id number]  [phone]    Schedule an appointment as soon as possible for a visit in 3 day(s)  If symptoms worsen          Medications Prescribed:  Current Discharge Medication List        START taking these medications    Details   traMADol 50 MG Oral Tab Take 1 tablet (50 mg total) by mouth every 8 (eight) hours as needed for Pain.  Qty: 10 tablet, Refills: 0    Associated Diagnoses: Lumbar sprain, initial encounter

## 2024-08-19 NOTE — TELEPHONE ENCOUNTER
Called patient with MD recommendations to present to Urgent Care for evaluation and possible imaging.  He verbalizes understanding.

## 2024-09-10 ENCOUNTER — NURSE TRIAGE (OUTPATIENT)
Dept: INTERNAL MEDICINE CLINIC | Facility: CLINIC | Age: 79
End: 2024-09-10

## 2024-09-10 NOTE — TELEPHONE ENCOUNTER
Received call from pt's wife, Catalina. Per Catalina, pt is feeling better and now that she sees him in better lighting, skin no longer looks yellow. Catalina asking if she still needs to bring pt to UC. Informed her if pt feeling better and skin not yellow, ok to monitor. If anything changes, advised UC. Catalina stated understanding and agreed to plan.

## 2024-09-10 NOTE — TELEPHONE ENCOUNTER
Reason for Disposition   HIGH RISK patient (e.g., weak immune system, age > 64 years, obesity with BMI of 30 or higher, pregnant, chronic lung disease or other chronic medical condition) and COVID symptoms (e.g., cough, fever)  (Exceptions: Already seen by doctor or NP/PA and no new or worsening symptoms.)    Protocols used: Coronavirus (COVID-19) Diagnosed or [id number]      Action Requested: Summary for Provider     []  Critical Lab, Recommendations Needed  [] Need Additional Advice  []   FYI    []   Need Orders  [] Need Medications Sent to Pharmacy  []  Other     SUMMARY: Received call from pt's spouse. Stated pt has Covid and she is wondering if she needs to take pt to . Symptoms started Friday. Wife had Covid as well. Stated pt has stopped coughing and sneezing. Denies chest pain and SOB. Appetite good. Using cough drops. Not sleeping well. Stated pt's \"skin coloring is yellow\" and asking if he needs to drink juice. Notified not a typical symptom for dehydration. Stated he is darker in color and was outside. Notified yellowing of the skin is concern for liver problems. She stated overall he is improving. Wife stated she needed to take a call now from her surgeon and will take him to  if he worsens. I advised to have her call back to finish conversation. She is agreeable to call back.     Reason for call: Covid  Onset: Data Unavailable

## 2024-10-16 NOTE — PROGRESS NOTES
[facility] Progress Note      Patient Name: Samuel Romero   YOB: 1945  Medical Record Number: [id number]  Attending Physician: Alejandro Pickett M.D.     The 21st Century Cures Act makes medical notes like these available to patients in the interest of transparency. Please be advised this is a medical document. Medical documents are intended to carry relevant information, facts as evident, and the clinical opinion of the practitioner. The medical note is intended as peer to peer communication and may appear blunt or direct. It is written in medical language and may contain abbreviations or verbiage that are unfamiliar.     Date of Visit: 10/17/2024       Chief Complaint  Prostate cancer - follow up.     Oncologic History  Samuel Romero is a 78 year old male referred for thrombocytosis. A review of the patient's laboratory studies in the Epic system show a normal platelet count of 308 in 04/2014. By 05/2015 the platelet count had increased to 528 K/mcl. Over time it continued to increase to its most recent value of 911 K/mcl on 10/11/2016. Patient has a history of prostate cancer for which he underwent prostatectomy in 02/2016 followed by external beam radiotherapy and short term androgen ablation. He had a colonoscopy in 2016 which was unremarkable.     Patient was empirically started on hydroxyurea while workup was initiated. Peripheral blood for JAK2, CALR, and MPL and BCR/ABL ultimately returned as negative. Bone marrow biopsy was performed but was not diagnostic. Repeat bone marrow biopsy was unremarkable.     It was then discovered that patient was drinking at least 6 cans of beer per day. He was asked to decrease his alcohol intake. With that his complete blood count normalized.     Routine follow up PSA on 09/30/2021 was 0.15 ng/ml as compared to 0.02 ng/ml on 03/09/2021. PSA was repeated on 01/10/2022 and had increased to 0.40 ng/ml. PSMA PET scan on 01/21/2022  showed uptake in a solitary bone lesion involving the 10th right rib.     From 02/21/2022 to 02/25/2022 he received radiation therapy to the right rib lesion.    From 07/17/2023 to 07/21/2023 he received SBRT to a left rib lesion after an increase in PSA.    Genetic testing performed on 03/07/2022 showed no known pathogenic variants in 84 genes including: AIP, ALK, APC, COLTON, AXIN2, BAP1, BARD1, BLM, BMPR1A, BRCA1, BRCA2, BRIP1, CASR, CDC73, CDH1, CDK4, CDKN1B, CDKN1C, CDKN2A (p14ARF), CDKN2A (p.08ITF8b), CEBPA, CHEK2, DICER1, DIS3L2, EGFR (sequencing only), FH, FLCN, GATA2, GPC3, GREM1 (promoter region deletion/duplication testing only), HOXB13 (sequencing only), HRAS, KIT,MAX,  MEN1, MET, MITF (sequencing only), MLH1, MSH2 (including EPCAM rearrangement testing), MSH6, MUTYH, NBN, NF1, NF2, PALB2, PDGRFA, PHOX2B, PMS2, POLD1, POLE, POT1, MRHHJ1A, PTCH1, PTEN, RAD50, RAD51C, RAD51D, RB1, RECQL4, RET, RUNX1, SDHAF2, SDHA (sequencing only), SDHB, SDHC, SDHD, SMAD4, SMARCA4, SMARCB1, SMARCE1, STK11, SUFU, TERC, TERT, PESF113, TP53, TSC1, TSC2, VHL, WRN, and WT1.  Four variants of uncertain significance, FLCN c.850G>A (p.Vwa228Sci)(possibly mosaic), MSH6 c.1685A>G (p.Iio169Xng), PALB2 c.37G>A (p.Gis15Zbg), and WRN c.1531G>C (p.Xbl402Slh), were identified.      On 09/2021 he was found to have new increase in PSA. In 02/2022, he underwent radiation therapy to a right rib lesion as the only site identified on a PSMA PET scan. Following radiation, however, his PSA did not decrease. In 07/2023 he underwent SBRT to a left rib lesion.     In 11/2023, a follow up PSMA PET scan was ordered but patient did not schedule. Instead, he sought an opinion at [facility] where a PSMA PET scan was ordered. This was performed on 01/08/2024 which showed multiple bone metastases.     PSA on 01/18/2024 was 8.43 ng/ml.     On 01/18/2024 he began systemic therapy with androgen ablation with triptorelin and soon after  abiraterone/prednisone were added.     History of Present Illness  Patient returns for follow up. He has no complaints. He reports mild hot flashes. No change in mood. He is taking abiraterone with a low fat meal.     Performance Status   Karnofsky 100% - Normal, no complaints.    Past Medical History (historical data, reviewed by physician)  Essential hypertension; hypothyroidism; hyperlipidemia; prostate cancer (Evelyn 4+5=9, [id number]) s/p prostatectomy and radiation therapy and short term androgen ablation; COVID 19; non-invasive low grade papillary urothelial carcinoma; paraganglioma.     Past Surgical History (historical data, reviewed by physician)  Tonsillectomy; vasectomy; robotic assisted laparoscopic prostatectomy and pelvic lymphadenectomy; inguinal hernia repair; TURBT; left neck paraganglioma.     Family History (historical data, reviewed by physician)  Sister with breast cancer; sister with colon cancer; father with prostate cancer.    Social History (historical data, reviewed by physician)  Previous tobacco use but quit 2015; uses alcohol daily.      Current Medications    atorvastatin 10 MG Oral Tab Take one tablet by mouth once daily at night. 90 tablet 2    Glucos-Chondroit-Hyaluron-MSM (GLUCOSAMINE CHONDROITIN JOINT OR) Take by mouth daily.      losartan 50 MG Oral Tab Take 1 tablet (50 mg total) by mouth daily. 90 tablet 2    levothyroxine 125 MCG Oral Tab Take 1 tablet (125 mcg total) by mouth every morning. 90 tablet 1    Abiraterone Acetate 250 MG Oral Tab Take 250 mg by mouth daily. Take with a low fat meal 30 tablet 11    predniSONE 10 MG Oral Tab Take 1 tablet (10 mg total) by mouth daily. 30 tablet 11    ASPIRIN 81 OR Take 81 mg by mouth daily.      Cholecalciferol (VITAMIN D3) 1000 UNITS Oral Cap Take 1 tablet by mouth daily.       Allergies   Mr. Romero has No Known Allergies.    Vital Signs   /86 (BP Location: Right arm, Patient Position: Sitting, Cuff Size: adult)   Pulse 76    Temp 97.5 °F (36.4 °C) (Temporal)   Resp 16   Ht 1.676 m (5' 5.98\")   Wt 67.4 kg (148 lb 9.6 oz)   SpO2 93%   BMI 24.00 kg/m²     Physical Examination   Constitutional Well developed, well nourished. Appears close to chronological age. No apparent distress.   Head  Normocephalic and atraumatic.  Eyes                  Conjunctiva clear; sclera anicteric.  ENMT  External nose normal; external ears normal.  Respiratory Normal effort; no respiratory distress; clear to auscultation bilaterally.  Cardiovascular Regular rate and rhythm; normal S1S2.  Abdomen Not distended.   Extremities No lower extremity edema.   Neurologic Motor and sensory grossly intact.  Psychiatric Mood and affect appropriate.    Laboratory  Recent Results (from the past 24 hours)   PSA - DIAGNOSTIC [E]    Collection Time: 10/17/24  9:52 AM   Result Value Ref Range    Total PSA  <0.04 <=4.00 ng/mL   CBC W/DIFF [E]    Collection Time: 10/17/24  9:52 AM   Result Value Ref Range    WBC 6.0 4.0 - 11.0 x10(3) uL    RBC 4.47 3.80 - 5.80 x10(6)uL    HGB 14.9 13.0 - 17.5 g/dL    HCT 43.7 39.0 - 53.0 %    .0 150.0 - 450.0 10(3)uL    MCV 97.8 80.0 - 100.0 fL    MCH 33.3 26.0 - 34.0 pg    MCHC 34.1 31.0 - 37.0 g/dL    RDW 12.5 %    Neutrophil Absolute Prelim 4.22 1.50 - 7.70 x10 (3) uL    Neutrophil Absolute 4.22 1.50 - 7.70 x10(3) uL    Lymphocyte Absolute 0.83 (L) 1.00 - 4.00 x10(3) uL    Monocyte Absolute 0.77 0.10 - 1.00 x10(3) uL    Eosinophil Absolute 0.02 0.00 - 0.70 x10(3) uL    Basophil Absolute 0.01 0.00 - 0.20 x10(3) uL    Immature Granulocyte Absolute 0.13 0.00 - 1.00 x10(3) uL    Neutrophil % 70.5 %    Lymphocyte % 13.9 %    Monocyte % 12.9 %    Eosinophil % 0.3 %    Basophil % 0.2 %    Immature Granulocyte % 2.2 %   COMP METABOLIC PANEL [E]    Collection Time: 10/17/24  9:52 AM   Result Value Ref Range    Glucose 107 (H) 70 - 99 mg/dL    Sodium 138 136 - 145 mmol/L    Potassium 3.9 3.5 - 5.1 mmol/L    Chloride 106 98 - 112 mmol/L     CO2 29.0 21.0 - 32.0 mmol/L    Anion Gap 3 0 - 18 mmol/L    BUN 11 9 - 23 mg/dL    Creatinine 0.73 0.70 - 1.30 mg/dL    Calcium, Total 10.8 (H) 8.7 - 10.4 mg/dL    Calculated Osmolality 286 275 - 295 mOsm/kg    eGFR-Cr 94 >=60 mL/min/1.73m2    AST 17 <34 U/L    ALT 16 10 - 49 U/L    Alkaline Phosphatase 86 45 - 117 U/L    Bilirubin, Total 1.2 (H) 0.2 - 1.1 mg/dL    Total Protein 7.1 5.7 - 8.2 g/dL    Albumin 4.9 (H) 3.2 - 4.8 g/dL    Globulin  2.2 2.0 - 3.5 g/dL    A/G Ratio 2.2 (H) 1.0 - 2.0    Patient Fasting for CMP? No      Impression and Plan   1.   Prostate cancer, metastatic: PSA is now undetectable. He is tolerating therapy well. Continue combined androgen ablation with triptorelin and abiraterone/prednisone. Triptorelin today.     2.   Bone metastasis: Zoledronic acid today. Next due in 01/2025.    Planned Follow Up   Patient will return for follow up and therapy in 3 months.    Electronically signed by:    Alejandro Pickett M.D.  System Medical Director of Oncology Services  [facility]

## 2024-10-17 ENCOUNTER — OFFICE VISIT (OUTPATIENT)
Dept: HEMATOLOGY/ONCOLOGY | Facility: HOSPITAL | Age: 79
End: 2024-10-17
Attending: INTERNAL MEDICINE
Payer: MEDICARE

## 2024-10-17 VITALS
TEMPERATURE: 98 F | BODY MASS INDEX: 23.89 KG/M2 | HEART RATE: 76 BPM | RESPIRATION RATE: 16 BRPM | HEIGHT: 65.98 IN | SYSTOLIC BLOOD PRESSURE: 133 MMHG | OXYGEN SATURATION: 93 % | DIASTOLIC BLOOD PRESSURE: 86 MMHG | WEIGHT: 148.63 LBS

## 2024-10-17 DIAGNOSIS — C61 PROSTATE CANCER METASTATIC TO BONE (HCC): ICD-10-CM

## 2024-10-17 DIAGNOSIS — C79.51 PROSTATE CANCER METASTATIC TO BONE (HCC): ICD-10-CM

## 2024-10-17 DIAGNOSIS — C61 PROSTATE CANCER (HCC): Primary | ICD-10-CM

## 2024-10-17 DIAGNOSIS — Z79.899 ON ANDROGEN ABLATION THERAPY: Primary | ICD-10-CM

## 2024-10-17 LAB
ALBUMIN SERPL-MCNC: 4.9 G/DL (ref 3.2–4.8)
ALBUMIN/GLOB SERPL: 2.2 {RATIO} (ref 1–2)
ALP LIVER SERPL-CCNC: 86 U/L
ALT SERPL-CCNC: 16 U/L
ANION GAP SERPL CALC-SCNC: 3 MMOL/L (ref 0–18)
AST SERPL-CCNC: 17 U/L (ref ?–34)
BASOPHILS # BLD AUTO: 0.01 X10(3) UL (ref 0–0.2)
BASOPHILS NFR BLD AUTO: 0.2 %
BILIRUB SERPL-MCNC: 1.2 MG/DL (ref 0.2–1.1)
BUN BLD-MCNC: 11 MG/DL (ref 9–23)
CALCIUM BLD-MCNC: 10.8 MG/DL (ref 8.7–10.4)
CHLORIDE SERPL-SCNC: 106 MMOL/L (ref 98–112)
CO2 SERPL-SCNC: 29 MMOL/L (ref 21–32)
CREAT BLD-MCNC: 0.73 MG/DL
EGFRCR SERPLBLD CKD-EPI 2021: 94 ML/MIN/1.73M2 (ref 60–?)
EOSINOPHIL # BLD AUTO: 0.02 X10(3) UL (ref 0–0.7)
EOSINOPHIL NFR BLD AUTO: 0.3 %
ERYTHROCYTE [DISTWIDTH] IN BLOOD BY AUTOMATED COUNT: 12.5 %
FASTING STATUS PATIENT QL REPORTED: NO
GLOBULIN PLAS-MCNC: 2.2 G/DL (ref 2–3.5)
GLUCOSE BLD-MCNC: 107 MG/DL (ref 70–99)
HCT VFR BLD AUTO: 43.7 %
HGB BLD-MCNC: 14.9 G/DL
IMM GRANULOCYTES # BLD AUTO: 0.13 X10(3) UL (ref 0–1)
IMM GRANULOCYTES NFR BLD: 2.2 %
LYMPHOCYTES # BLD AUTO: 0.83 X10(3) UL (ref 1–4)
LYMPHOCYTES NFR BLD AUTO: 13.9 %
MCH RBC QN AUTO: 33.3 PG (ref 26–34)
MCHC RBC AUTO-ENTMCNC: 34.1 G/DL (ref 31–37)
MCV RBC AUTO: 97.8 FL
MONOCYTES # BLD AUTO: 0.77 X10(3) UL (ref 0.1–1)
MONOCYTES NFR BLD AUTO: 12.9 %
NEUTROPHILS # BLD AUTO: 4.22 X10 (3) UL (ref 1.5–7.7)
NEUTROPHILS # BLD AUTO: 4.22 X10(3) UL (ref 1.5–7.7)
NEUTROPHILS NFR BLD AUTO: 70.5 %
OSMOLALITY SERPL CALC.SUM OF ELEC: 286 MOSM/KG (ref 275–295)
PLATELET # BLD AUTO: 156 10(3)UL (ref 150–450)
POTASSIUM SERPL-SCNC: 3.9 MMOL/L (ref 3.5–5.1)
PROT SERPL-MCNC: 7.1 G/DL (ref 5.7–8.2)
PSA SERPL-MCNC: <0.04 NG/ML (ref ?–4)
RBC # BLD AUTO: 4.47 X10(6)UL
SODIUM SERPL-SCNC: 138 MMOL/L (ref 136–145)
WBC # BLD AUTO: 6 X10(3) UL (ref 4–11)

## 2024-10-17 PROCEDURE — 99214 OFFICE O/P EST MOD 30 MIN: CPT | Performed by: SPECIALIST

## 2024-10-17 PROCEDURE — 96402 CHEMO HORMON ANTINEOPL SQ/IM: CPT

## 2024-10-17 PROCEDURE — 96374 THER/PROPH/DIAG INJ IV PUSH: CPT

## 2024-10-17 PROCEDURE — G2211 COMPLEX E/M VISIT ADD ON: HCPCS | Performed by: SPECIALIST

## 2024-10-17 NOTE — PROGRESS NOTES
Education Record    Learner:  Patient    Disease / Diagnosis:prostate cancer   Abiaterone  Prednisone  Trelstar- ;last dose 7/11/24  Zometa     Barriers / Limitations:  None   Comments:    Method:  Discussion   Comments:    General Topics:  Plan of care reviewed   Comments:    Outcome:  Shows understanding   Comments: questions addressed regarding zometa infusion.   Had a recent teeth cleaning   Feeling well.   No new pain.   Some fatigue, but able to do what he wants with rest periods.

## 2024-10-17 NOTE — PROGRESS NOTES
Education Record    Learner:  Patient    Disease / Diagnosis:Prostate Ca    Barriers / Limitations:  None   Comments:    Method:  Brief focused   Comments:    General Topics:  Diet, Infection, Medication, Pain, Precautions, Procedure, Side effects and symptom management, Plan of care reviewed, and Fall risk and prevention   Comments:    Outcome:  Shows understanding   Comments:    Patient tolerated well his Trelsatar injection + Zometa

## 2024-11-06 LAB
BUN/CREATININE RATIO: 12 (CALC) (ref 6–22)
BUN: 8 MG/DL (ref 7–25)
CALCIUM: 9.5 MG/DL (ref 8.6–10.3)
CARBON DIOXIDE: 29 MMOL/L (ref 20–32)
CHLORIDE: 106 MMOL/L (ref 98–110)
CHOL/HDLC RATIO: 2.2 (CALC)
CHOLESTEROL, TOTAL: 201 MG/DL
CREATININE: 0.66 MG/DL (ref 0.7–1.28)
EGFR: 97 ML/MIN/1.73M2
GLUCOSE: 117 MG/DL (ref 65–99)
HDL CHOLESTEROL: 91 MG/DL
HEMOGLOBIN A1C: 6 % OF TOTAL HGB
LDL-CHOLESTEROL: 90 MG/DL (CALC)
NON-HDL CHOLESTEROL: 110 MG/DL (CALC)
POTASSIUM: 5.1 MMOL/L (ref 3.5–5.3)
SODIUM: 143 MMOL/L (ref 135–146)
T4, FREE: 2.3 NG/DL (ref 0.8–1.8)
TRIGLYCERIDES: 102 MG/DL
TSH: 0.33 MIU/L (ref 0.4–4.5)

## 2024-11-07 RX ORDER — LEVOTHYROXINE SODIUM 100 UG/1
100 TABLET ORAL
Qty: 90 TABLET | Refills: 1 | Status: SHIPPED | OUTPATIENT
Start: 2024-11-07

## 2024-11-15 ENCOUNTER — OFFICE VISIT (OUTPATIENT)
Dept: FAMILY MEDICINE CLINIC | Facility: CLINIC | Age: 79
End: 2024-11-15
Payer: COMMERCIAL

## 2024-11-15 VITALS
HEART RATE: 87 BPM | BODY MASS INDEX: 24.4 KG/M2 | SYSTOLIC BLOOD PRESSURE: 134 MMHG | TEMPERATURE: 97 F | OXYGEN SATURATION: 90 % | DIASTOLIC BLOOD PRESSURE: 76 MMHG | WEIGHT: 150 LBS | HEIGHT: 65.9 IN | RESPIRATION RATE: 16 BRPM

## 2024-11-15 DIAGNOSIS — I10 ESSENTIAL HYPERTENSION: ICD-10-CM

## 2024-11-15 DIAGNOSIS — L72.3 SEBACEOUS CYST: ICD-10-CM

## 2024-11-15 DIAGNOSIS — J43.2 CENTRILOBULAR EMPHYSEMA (HCC): ICD-10-CM

## 2024-11-15 DIAGNOSIS — C79.51 PROSTATE CANCER METASTATIC TO BONE (HCC): ICD-10-CM

## 2024-11-15 DIAGNOSIS — E78.49 OTHER HYPERLIPIDEMIA: ICD-10-CM

## 2024-11-15 DIAGNOSIS — E55.9 VITAMIN D DEFICIENCY: ICD-10-CM

## 2024-11-15 DIAGNOSIS — E03.9 ACQUIRED HYPOTHYROIDISM: Primary | ICD-10-CM

## 2024-11-15 DIAGNOSIS — R73.03 PREDIABETES: ICD-10-CM

## 2024-11-15 DIAGNOSIS — Z51.81 ENCOUNTER FOR MEDICATION MONITORING: ICD-10-CM

## 2024-11-15 DIAGNOSIS — C61 PROSTATE CANCER METASTATIC TO BONE (HCC): ICD-10-CM

## 2024-11-15 PROBLEM — H57.03 MIOSIS: Status: ACTIVE | Noted: 2024-09-23

## 2024-11-15 PROBLEM — H25.13 AGE-RELATED NUCLEAR CATARACT OF BOTH EYES: Status: ACTIVE | Noted: 2024-09-23

## 2024-11-15 PROCEDURE — 3078F DIAST BP <80 MM HG: CPT | Performed by: FAMILY MEDICINE

## 2024-11-15 PROCEDURE — 3008F BODY MASS INDEX DOCD: CPT | Performed by: FAMILY MEDICINE

## 2024-11-15 PROCEDURE — G2211 COMPLEX E/M VISIT ADD ON: HCPCS | Performed by: FAMILY MEDICINE

## 2024-11-15 PROCEDURE — 99214 OFFICE O/P EST MOD 30 MIN: CPT | Performed by: FAMILY MEDICINE

## 2024-11-15 PROCEDURE — 3075F SYST BP GE 130 - 139MM HG: CPT | Performed by: FAMILY MEDICINE

## 2024-11-15 PROCEDURE — 1159F MED LIST DOCD IN RCRD: CPT | Performed by: FAMILY MEDICINE

## 2024-11-15 PROCEDURE — 1160F RVW MEDS BY RX/DR IN RCRD: CPT | Performed by: FAMILY MEDICINE

## 2024-11-15 NOTE — PROGRESS NOTES
Samuel Romero is a 79 year old male.  HPI:  Pt is here for f/u labs and medication.  Overall doing well.  Tolerating medications without side effects.  Started on lower dose of levothyroxine, 100 mcg daily after recent labs with low TSH.  Ran out of Vitamin D a few weeks ago.  Did not restart.  Denies any lightheadedness or dizziness.  No chest pain or palpitations.  Chronic, stable ANGEL.  No cough or congestion.  Had follow-up with oncologist last month.  Tolerating treatments for metastatic prostate CA.  No polyuria or polydipsia.    Current Outpatient Medications   Medication Sig Dispense Refill    levothyroxine 100 MCG Oral Tab Take 1 tablet (100 mcg total) by mouth before breakfast. 90 tablet 1    atorvastatin 10 MG Oral Tab Take one tablet by mouth once daily at night. 90 tablet 2    Glucos-Chondroit-Hyaluron-MSM (GLUCOSAMINE CHONDROITIN JOINT OR) Take by mouth daily.      losartan 50 MG Oral Tab Take 1 tablet (50 mg total) by mouth daily. 90 tablet 2    Abiraterone Acetate 250 MG Oral Tab Take 250 mg by mouth daily. Take with a low fat meal 30 tablet 11    predniSONE 10 MG Oral Tab Take 1 tablet (10 mg total) by mouth daily. 30 tablet 11    ASPIRIN 81 OR Take 81 mg by mouth daily.      Cholecalciferol (VITAMIN D3) 1000 UNITS Oral Cap Take 1 tablet by mouth daily. (Patient not taking: Reported on 11/15/2024)           Past Medical History:    Cancer (HCC)    Prostate    Cancer (HCC)    Urothelial Cancer:low grade    Cataracts, bilateral    COPD (chronic obstructive pulmonary disease) (HCC)    COVID-19 virus infection    Essential hypertension    Hyperlipidemia    Hypothyroidism    Hypothyroidism    Narrow angle of anterior chamber of both eyes    Prostate cancer (HCC)    Superior mesenteric artery stenosis (HCC)       Past Surgical History:   Procedure Laterality Date    Colonoscopy  2008    Colonoscopy  6/2016    Normal, recheck 10 yrs    Hernia surgery  2013    inguinal    Other surgical history  02/2016     Robotic Assisted Lap Prostatectomy/Pelvic lymphadenoctomy    Other surgical history  2017    cyst excision left jaw    Other surgical history  2020    Cystoscopy under anesthesia, clot evacuation, bladder biopsy, fulguration, bilateral retrograde pyelography, fluoroscopic interpretation, 20 [place] three-way catheter placement for continuous bladder irrigation.    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      Other surgical history  2022    Cystoscopy Dr. Peña    Skin surgery  10/2019    skin lesion biopsy x 2, benign    Tonsillectomy      Vasectomy      Yag iridotomy - ou - both eyes  2019         Social History     Socioeconomic History    Marital status:    Tobacco Use    Smoking status: Former     Current packs/day: 0.00     Average packs/day: 0.5 packs/day for 50.0 years (25.0 ttl pk-yrs)     Types: Cigarettes     Start date: 5/15/1965     Quit date: 5/15/2015     Years since quittin.6    Smokeless tobacco: Former     Quit date: 2015   Vaping Use    Vaping status: Never Used   Substance and Sexual Activity    Alcohol use: Yes     Alcohol/week: 3.0 standard drinks of alcohol     Types: 3 Cans of beer per week     Comment: per night    Drug use: Yes     Types: Cannabis     Comment: 1 x per week   Other Topics Concern    Seat Belt Yes                   REVIEW OF SYSTEMS:  GENERAL HEALTH: feels well otherwise, mood is good  SKIN: denies any unusual skin lesions or rashes  RESPIRATORY: As above  CARDIOVASCULAR: denies chest pain on exertion  GI: denies abdominal pain and denies heartburn  : No change in bladder  NEURO: denies headaches, denies dizziness    EXAM:  /76   Pulse 87   Temp 97.1 °F (36.2 °C) (Temporal)   Resp 16   Ht 5' 5.9\" (1.674 m)   Wt 150 lb (68 kg)   SpO2 90%   BMI 24.28 kg/m²   Wt Readings from Last 6 Encounters:   11/15/24 150 lb (68 kg)   10/17/24 148 lb 9.6 oz (67.4 kg)   24  152 lb (68.9 kg)   07/18/24 152 lb (68.9 kg)   07/11/24 151 lb (68.5 kg)   05/15/24 152 lb (68.9 kg)     GENERAL: well developed, well nourished,in no apparent distress, pleasant affect  SKIN: no rashes,  HEENT: atraumatic, normocephalic,  Conj clear  OMM, throat clear  NECK: supple,no adenopathy,noTM\  Right lateral neck with small subcutaneous mobile mass about 8 mm size, nontender (chronic and stable)   LUNGS: clear to auscultation, no wheezing  CARDIO: RRR without murmur, normal S1, S2, no ectopy  GI: NABS, soft, no tenderness, no masses, no HSM, ND  EXTREMITIES: no cyanosis, clubbing or edema  NEURO: A&O x3, no focal deficits  Normal gait    ASSESSMENT AND PLAN:    1. Acquired hypothyroidism  Reviewed recent TFTs with low TSH and high free T4.  Levothyroxine dose has been decreased to 100 mcg daily.  Advised to continue this dose daily as directed.  Plan recheck labs in about 6-8 weeks.  - TSH [899] [Q]; Future  - T4 FREE [866] [Q]; Future    2. Essential hypertension  Blood pressure controlled.  Electrolytes normal.  Continue losartan daily  - Comp Metabolic Panel (14); Future    3. Other hyperlipidemia  Lipid panel reviewed with LDL at goal.  Triglycerides normal.  HDL elevated.  Previous LFTs were normal.  Continue current statin dose.  Monitor.  - Comp Metabolic Panel (14); Future  - Comp Metabolic Panel (14)    4. Prediabetes  Fasting blood sugar 117.  Hemoglobin A1c 6.0, higher than previous.  Advised to decrease carbohydrates/sugars in diet.  Regular exercise will also help.  Noted on daily prednisone per oncology   Monitor with TLC  - Comp Metabolic Panel (14); Future  - Comp Metabolic Panel (14)    5. Vitamin D deficiency  Advised to r/s Vitamin D3, 1000 units daily    6. Prostate cancer metastatic to bone (HCC)  PSA done last month undetectable.  On androgen ablation therapy per oncology.  Tolerating well.  Zoledronic acid treatment due next month    7. Sebaceous cyst  Chronic, stable sebaceous cyst  on right side of neck.  Warm compress twice daily as needed.  Consider excision if notes increased size or symptoms.    8. Centrilobular emphysema (HCC)  No significant symptoms. Stable, chronic mild ANGEL.  Prefers to avoid use of inhalers, which patient states did not make much of a difference in the past.    9. Encounter for medication monitoring  - Comp Metabolic Panel (14); Future  - TSH [899] [Q]; Future  - T4 FREE [866] [Q]; Future

## 2025-01-06 NOTE — PROGRESS NOTES
[facility]ematology Oncology [facility] Progress Note       Patient Name: Samuel Romero   YOB: 1945  Medical Record Number: [id number]  Attending Physician: Alejandro Pickett M.D.     The 21st Century Cures Act makes medical notes like these available to patients in the interest of transparency. Please be advised this is a medical document. Medical documents are intended to carry relevant information, facts as evident, and the clinical opinion of the practitioner. The medical note is intended as peer to peer communication and may appear blunt or direct. It is written in medical language and may contain abbreviations or verbiage that are unfamiliar.     Date of Visit: 1/13/2025       Chief Complaint  Prostate cancer - follow up.     Oncologic History  Samuel Romero is a 79 year old male referred for thrombocytosis. A review of the patient's laboratory studies in the Epic system show a normal platelet count of 308 in 04/2014. By 05/2015 the platelet count had increased to 528 K/mcl. Over time it continued to increase to its most recent value of 911 K/mcl on 10/11/2016. Patient has a history of prostate cancer for which he underwent prostatectomy in 02/2016 followed by external beam radiotherapy and short term androgen ablation. He had a colonoscopy in 2016 which was unremarkable.     Patient was empirically started on hydroxyurea while workup was initiated. Peripheral blood for JAK2, CALR, and MPL and BCR/ABL ultimately returned as negative. Bone marrow biopsy was performed but was not diagnostic. Repeat bone marrow biopsy was unremarkable.     It was then discovered that patient was drinking at least 6 cans of beer per day. He was asked to decrease his alcohol intake. With that his complete blood count normalized.     Routine follow up PSA on 09/30/2021 was 0.15 ng/ml as compared to 0.02 ng/ml on 03/09/2021. PSA was repeated on 01/10/2022 and had increased to 0.40 ng/ml. PSMA PET scan on  01/21/2022 showed uptake in a solitary bone lesion involving the 10th right rib.     From 02/21/2022 to 02/25/2022 he received radiation therapy to the right rib lesion.    From 07/17/2023 to 07/21/2023 he received SBRT to a left rib lesion after an increase in PSA.    Genetic testing performed on 03/07/2022 showed no known pathogenic variants in 84 genes including: AIP, ALK, APC, COLTON, AXIN2, BAP1, BARD1, BLM, BMPR1A, BRCA1, BRCA2, BRIP1, CASR, CDC73, CDH1, CDK4, CDKN1B, CDKN1C, CDKN2A (p14ARF), CDKN2A (p.42EFK5h), CEBPA, CHEK2, DICER1, DIS3L2, EGFR (sequencing only), FH, FLCN, GATA2, GPC3, GREM1 (promoter region deletion/duplication testing only), HOXB13 (sequencing only), HRAS, KIT,MAX,  MEN1, MET, MITF (sequencing only), MLH1, MSH2 (including EPCAM rearrangement testing), MSH6, MUTYH, NBN, NF1, NF2, PALB2, PDGRFA, PHOX2B, PMS2, POLD1, POLE, POT1, TGMWE7D, PTCH1, PTEN, RAD50, RAD51C, RAD51D, RB1, RECQL4, RET, RUNX1, SDHAF2, SDHA (sequencing only), SDHB, SDHC, SDHD, SMAD4, SMARCA4, SMARCB1, SMARCE1, STK11, SUFU, TERC, TERT, ZMIT948, TP53, TSC1, TSC2, VHL, WRN, and WT1.  Four variants of uncertain significance, FLCN c.850G>A (p.Rcp191Xvw)(possibly mosaic), MSH6 c.1685A>G (p.Ssz773Jhl), PALB2 c.37G>A (p.Ctj65Coz), and WRN c.1531G>C (p.Mqo342Xfk), were identified.      On 09/2021 he was found to have new increase in PSA. In 02/2022, he underwent radiation therapy to a right rib lesion as the only site identified on a PSMA PET scan. Following radiation, however, his PSA did not decrease. In 07/2023 he underwent SBRT to a left rib lesion.     In 11/2023, a follow up PSMA PET scan was ordered but patient did not schedule. Instead, he sought an opinion at [facility] where a PSMA PET scan was ordered. This was performed on 01/08/2024 which showed multiple bone metastases.     PSA on 01/18/2024 was 8.43 ng/ml.     On 01/18/2024 he began systemic therapy with androgen ablation with triptorelin and soon after  abiraterone/prednisone were added.     History of Present Illness  Patient returns for follow up. He has no complaints. He reports mild hot flashes. No change in mood. He is taking abiraterone with a low fat meal.     Performance Status   Karnofsky 100% - Normal, no complaints.    Past Medical History (historical data, reviewed by physician)  Essential hypertension; hypothyroidism; hyperlipidemia; prostate cancer (Evelyn 4+5=9, [id number]) s/p prostatectomy and radiation therapy and short term androgen ablation; COVID 19; non-invasive low grade papillary urothelial carcinoma; paraganglioma.     Past Surgical History (historical data, reviewed by physician)  Tonsillectomy; vasectomy; robotic assisted laparoscopic prostatectomy and pelvic lymphadenectomy; inguinal hernia repair; TURBT; left neck paraganglioma.     Family History (historical data, reviewed by physician)  Sister with breast cancer; sister with colon cancer; father with prostate cancer.    Social History (historical data, reviewed by physician)  Previous tobacco use but quit 2015; uses alcohol daily.      Current Medications    [DISCONTINUED] levothyroxine 100 MCG Oral Tab Take 1 tablet (100 mcg total) by mouth before breakfast. 90 tablet 1    atorvastatin 10 MG Oral Tab Take one tablet by mouth once daily at night. 90 tablet 2    Glucos-Chondroit-Hyaluron-MSM (GLUCOSAMINE CHONDROITIN JOINT OR) Take by mouth daily.      losartan 50 MG Oral Tab Take 1 tablet (50 mg total) by mouth daily. 90 tablet 2    [DISCONTINUED] Abiraterone Acetate 250 MG Oral Tab Take 250 mg by mouth daily. Take with a low fat meal 30 tablet 11    [DISCONTINUED] predniSONE 10 MG Oral Tab Take 1 tablet (10 mg total) by mouth daily. 30 tablet 11    ASPIRIN 81 OR Take 81 mg by mouth daily.       Allergies   Mr. Romero has No Known Allergies.    Vital Signs   /77 (BP Location: Left arm, Patient Position: Sitting, Cuff Size: adult)   Pulse 83   Temp 97 °F (36.1 °C) (Temporal)    Resp 16   Ht 1.676 m (5' 5.98\")   Wt 68 kg (150 lb)   SpO2 90%   BMI 24.22 kg/m²     Physical Examination   Constitutional Well developed, well nourished. No apparent distress.   Head  Normocephalic and atraumatic.  Eyes                  Conjunctiva clear; sclera anicteric.  ENMT  External nose normal; external ears normal.  Respiratory Normal effort; no respiratory distress; clear to auscultation bilaterally.  Cardiovascular Regular rate and rhythm; normal S1S2.  Abdomen Soft; not tender; no masses.   Extremities No lower extremity edema.   Neurologic Motor and sensory grossly intact.  Psychiatric Mood and affect appropriate.    Laboratory  Recent Results (from the past week)   COMP METABOLIC PANEL [E]    Collection Time: 01/13/25  9:25 AM   Result Value Ref Range    Glucose 92 70 - 99 mg/dL    Sodium 145 136 - 145 mmol/L    Potassium 3.8 3.5 - 5.1 mmol/L    Chloride 110 98 - 112 mmol/L    CO2 30.0 21.0 - 32.0 mmol/L    Anion Gap 5 0 - 18 mmol/L    BUN 13 9 - 23 mg/dL    Creatinine 0.82 0.70 - 1.30 mg/dL    Calcium, Total 10.0 8.7 - 10.4 mg/dL    Calculated Osmolality 300 (H) 275 - 295 mOsm/kg    eGFR-Cr 90 >=60 mL/min/1.73m2    AST 18 <34 U/L    ALT 16 10 - 49 U/L    Alkaline Phosphatase 55 45 - 117 U/L    Bilirubin, Total 1.2 (H) 0.2 - 1.1 mg/dL    Total Protein 6.7 5.7 - 8.2 g/dL    Albumin 4.5 3.2 - 4.8 g/dL    Globulin  2.2 2.0 - 3.5 g/dL    A/G Ratio 2.0 1.0 - 2.0    Patient Fasting for CMP? Patient not present    CBC W/DIFF [E]    Collection Time: 01/13/25  9:25 AM   Result Value Ref Range    WBC 4.8 4.0 - 11.0 x10(3) uL    RBC 4.15 3.80 - 5.80 x10(6)uL    HGB 13.8 13.0 - 17.5 g/dL    HCT 42.1 39.0 - 53.0 %    .0 (L) 150.0 - 450.0 10(3)uL    .4 (H) 80.0 - 100.0 fL    MCH 33.3 26.0 - 34.0 pg    MCHC 32.8 31.0 - 37.0 g/dL    RDW 13.2 %    Neutrophil Absolute Prelim 2.60 1.50 - 7.70 x10 (3) uL    Neutrophil Absolute 2.60 1.50 - 7.70 x10(3) uL    Lymphocyte Absolute 1.15 1.00 - 4.00 x10(3) uL     Monocyte Absolute 0.87 0.10 - 1.00 x10(3) uL    Eosinophil Absolute 0.03 0.00 - 0.70 x10(3) uL    Basophil Absolute 0.01 0.00 - 0.20 x10(3) uL    Immature Granulocyte Absolute 0.10 0.00 - 1.00 x10(3) uL    Neutrophil % 54.6 %    Lymphocyte % 24.2 %    Monocyte % 18.3 %    Eosinophil % 0.6 %    Basophil % 0.2 %    Immature Granulocyte % 2.1 %   PSA - DIAGNOSTIC [E]    Collection Time: 01/13/25  9:25 AM   Result Value Ref Range    Total PSA  <0.04 <=4.00 ng/mL   Comp Metabolic Panel (14)    Collection Time: 01/14/25  9:00 AM   Result Value Ref Range    GLUCOSE 97 65 - 99 mg/dL    UREA NITROGEN (BUN) 13 7 - 25 mg/dL    CREATININE 0.67 (L) 0.70 - 1.28 mg/dL    EGFR 96 > OR = 60 mL/min/1.73m2    BUN/CREATININE RATIO 19 6 - 22 (calc)    SODIUM 145 135 - 146 mmol/L    POTASSIUM 3.8 3.5 - 5.3 mmol/L    CHLORIDE 108 98 - 110 mmol/L    CARBON DIOXIDE 30 20 - 32 mmol/L    CALCIUM 9.3 8.6 - 10.3 mg/dL    PROTEIN, TOTAL 6.5 6.1 - 8.1 g/dL    ALBUMIN 4.4 3.6 - 5.1 g/dL    GLOBULIN 2.1 1.9 - 3.7 g/dL (calc)    ALBUMIN/GLOBULIN RATIO 2.1 1.0 - 2.5 (calc)    BILIRUBIN, TOTAL 0.9 0.2 - 1.2 mg/dL    ALKALINE PHOSPHATASE 50 35 - 144 U/L    AST 15 10 - 35 U/L    ALT 14 9 - 46 U/L   TSH [899] [Q]    Collection Time: 01/14/25  9:00 AM   Result Value Ref Range    TSH 6.03 (H) 0.40 - 4.50 mIU/L   T4 FREE [866] [Q]    Collection Time: 01/14/25  9:00 AM   Result Value Ref Range    T4, FREE 1.6 0.8 - 1.8 ng/dL     Impression and Plan   1.   Prostate cancer, metastatic: PSA is undetectable. He is tolerating therapy well. Continue combined androgen ablation with triptorelin and abiraterone/prednisone. Triptorelin today.     2.   Bone metastasis: Zoledronic acid today.     Planned Follow Up   Patient will return for follow up and therapy in 3 months.    Electronically Signed by:     Alejandro Pickett M.D.  System Medical Director, Oncology Services  [place] [facility]

## 2025-01-13 ENCOUNTER — OFFICE VISIT (OUTPATIENT)
Age: 80
End: 2025-01-13
Attending: INTERNAL MEDICINE
Payer: MEDICARE

## 2025-01-13 VITALS
BODY MASS INDEX: 24.11 KG/M2 | RESPIRATION RATE: 16 BRPM | DIASTOLIC BLOOD PRESSURE: 77 MMHG | WEIGHT: 150 LBS | OXYGEN SATURATION: 90 % | TEMPERATURE: 97 F | HEIGHT: 65.98 IN | HEART RATE: 83 BPM | SYSTOLIC BLOOD PRESSURE: 133 MMHG

## 2025-01-13 DIAGNOSIS — C61 PROSTATE CANCER METASTATIC TO BONE (HCC): ICD-10-CM

## 2025-01-13 DIAGNOSIS — C79.51 PROSTATE CANCER METASTATIC TO BONE (HCC): ICD-10-CM

## 2025-01-13 DIAGNOSIS — C61 PROSTATE CANCER (HCC): Primary | ICD-10-CM

## 2025-01-13 DIAGNOSIS — Z79.899 ON ANDROGEN ABLATION THERAPY: Primary | ICD-10-CM

## 2025-01-13 LAB
ALBUMIN SERPL-MCNC: 4.5 G/DL (ref 3.2–4.8)
ALBUMIN/GLOB SERPL: 2 {RATIO} (ref 1–2)
ALP LIVER SERPL-CCNC: 55 U/L
ALT SERPL-CCNC: 16 U/L
ANION GAP SERPL CALC-SCNC: 5 MMOL/L (ref 0–18)
AST SERPL-CCNC: 18 U/L (ref ?–34)
BASOPHILS # BLD AUTO: 0.01 X10(3) UL (ref 0–0.2)
BASOPHILS NFR BLD AUTO: 0.2 %
BILIRUB SERPL-MCNC: 1.2 MG/DL (ref 0.2–1.1)
BUN BLD-MCNC: 13 MG/DL (ref 9–23)
CALCIUM BLD-MCNC: 10 MG/DL (ref 8.7–10.4)
CHLORIDE SERPL-SCNC: 110 MMOL/L (ref 98–112)
CO2 SERPL-SCNC: 30 MMOL/L (ref 21–32)
CREAT BLD-MCNC: 0.82 MG/DL
EGFRCR SERPLBLD CKD-EPI 2021: 90 ML/MIN/1.73M2 (ref 60–?)
EOSINOPHIL # BLD AUTO: 0.03 X10(3) UL (ref 0–0.7)
EOSINOPHIL NFR BLD AUTO: 0.6 %
ERYTHROCYTE [DISTWIDTH] IN BLOOD BY AUTOMATED COUNT: 13.2 %
GLOBULIN PLAS-MCNC: 2.2 G/DL (ref 2–3.5)
GLUCOSE BLD-MCNC: 92 MG/DL (ref 70–99)
HCT VFR BLD AUTO: 42.1 %
HGB BLD-MCNC: 13.8 G/DL
IMM GRANULOCYTES # BLD AUTO: 0.1 X10(3) UL (ref 0–1)
IMM GRANULOCYTES NFR BLD: 2.1 %
LYMPHOCYTES # BLD AUTO: 1.15 X10(3) UL (ref 1–4)
LYMPHOCYTES NFR BLD AUTO: 24.2 %
MCH RBC QN AUTO: 33.3 PG (ref 26–34)
MCHC RBC AUTO-ENTMCNC: 32.8 G/DL (ref 31–37)
MCV RBC AUTO: 101.4 FL
MONOCYTES # BLD AUTO: 0.87 X10(3) UL (ref 0.1–1)
MONOCYTES NFR BLD AUTO: 18.3 %
NEUTROPHILS # BLD AUTO: 2.6 X10 (3) UL (ref 1.5–7.7)
NEUTROPHILS # BLD AUTO: 2.6 X10(3) UL (ref 1.5–7.7)
NEUTROPHILS NFR BLD AUTO: 54.6 %
OSMOLALITY SERPL CALC.SUM OF ELEC: 300 MOSM/KG (ref 275–295)
PLATELET # BLD AUTO: 147 10(3)UL (ref 150–450)
POTASSIUM SERPL-SCNC: 3.8 MMOL/L (ref 3.5–5.1)
PROT SERPL-MCNC: 6.7 G/DL (ref 5.7–8.2)
PSA SERPL-MCNC: <0.04 NG/ML (ref ?–4)
RBC # BLD AUTO: 4.15 X10(6)UL
SODIUM SERPL-SCNC: 145 MMOL/L (ref 136–145)
WBC # BLD AUTO: 4.8 X10(3) UL (ref 4–11)

## 2025-01-13 RX ORDER — PREDNISONE 10 MG/1
10 TABLET ORAL DAILY
Qty: 30 TABLET | Refills: 3 | Status: SHIPPED | OUTPATIENT
Start: 2025-01-13

## 2025-01-13 RX ORDER — ABIRATERONE ACETATE 250 MG/1
TABLET ORAL
Qty: 30 TABLET | Refills: 3 | Status: SHIPPED | OUTPATIENT
Start: 2025-01-13

## 2025-01-13 NOTE — PROGRESS NOTES
Pt here for Trelstar injection and IV Zometa. Pt reports mild fatigue. No c/o pain or changes in appetite.       Education Record    Learner:  Patient    Disease / Diagnosis: prostate cancer    Barriers / Limitations:  None   Comments:    Method:  Discussion   Comments:    General Topics:  Medication, Pain, Side effects and symptom management, and Plan of care reviewed   Comments:    Outcome:  Observed demonstration and Shows understanding   Comments:

## 2025-01-13 NOTE — PROGRESS NOTES
Education Record    Learner:  Patient    Disease / Diagnosis: Prostate CA    Barriers / Limitations:  None   Comments:    Method:  Discussion and Printed material   Comments:    General Topics:  Medication, Side effects and symptom management, Plan of care reviewed, and Fall risk and prevention   Comments:    Outcome:  Shows understanding   Comments:    Trelstar inj and Zometa infusion administered per order. Pt tolerated well. Discharged ambulatory in stable condition.

## 2025-01-15 LAB
ALBUMIN/GLOBULIN RATIO: 2.1 (CALC) (ref 1–2.5)
ALBUMIN: 4.4 G/DL (ref 3.6–5.1)
ALKALINE PHOSPHATASE: 50 U/L (ref 35–144)
ALT: 14 U/L (ref 9–46)
AST: 15 U/L (ref 10–35)
BILIRUBIN, TOTAL: 0.9 MG/DL (ref 0.2–1.2)
BUN/CREATININE RATIO: 19 (CALC) (ref 6–22)
BUN: 13 MG/DL (ref 7–25)
CALCIUM: 9.3 MG/DL (ref 8.6–10.3)
CARBON DIOXIDE: 30 MMOL/L (ref 20–32)
CHLORIDE: 108 MMOL/L (ref 98–110)
CREATININE: 0.67 MG/DL (ref 0.7–1.28)
EGFR: 96 ML/MIN/1.73M2
GLOBULIN: 2.1 G/DL (CALC) (ref 1.9–3.7)
GLUCOSE: 97 MG/DL (ref 65–99)
POTASSIUM: 3.8 MMOL/L (ref 3.5–5.3)
PROTEIN, TOTAL: 6.5 G/DL (ref 6.1–8.1)
SODIUM: 145 MMOL/L (ref 135–146)
T4, FREE: 1.6 NG/DL (ref 0.8–1.8)
TSH: 6.03 MIU/L (ref 0.4–4.5)

## 2025-01-16 DIAGNOSIS — E03.9 ACQUIRED HYPOTHYROIDISM: Primary | ICD-10-CM

## 2025-01-16 RX ORDER — LEVOTHYROXINE SODIUM 112 UG/1
112 TABLET ORAL
Qty: 60 TABLET | Refills: 0 | Status: SHIPPED | OUTPATIENT
Start: 2025-01-16

## 2025-01-21 ENCOUNTER — OFFICE VISIT (OUTPATIENT)
Dept: FAMILY MEDICINE CLINIC | Facility: CLINIC | Age: 80
End: 2025-01-21
Payer: COMMERCIAL

## 2025-01-21 VITALS
OXYGEN SATURATION: 97 % | HEIGHT: 65.98 IN | HEART RATE: 66 BPM | BODY MASS INDEX: 24.27 KG/M2 | RESPIRATION RATE: 16 BRPM | TEMPERATURE: 98 F | SYSTOLIC BLOOD PRESSURE: 130 MMHG | DIASTOLIC BLOOD PRESSURE: 80 MMHG | WEIGHT: 151 LBS

## 2025-01-21 DIAGNOSIS — I77.79 DISSECTION OF MESENTERIC ARTERY (HCC): ICD-10-CM

## 2025-01-21 DIAGNOSIS — Z51.81 ENCOUNTER FOR MEDICATION MONITORING: ICD-10-CM

## 2025-01-21 DIAGNOSIS — Z00.00 ENCOUNTER FOR ANNUAL HEALTH EXAMINATION: Primary | ICD-10-CM

## 2025-01-21 DIAGNOSIS — D75.89 MACROCYTOSIS WITHOUT ANEMIA: ICD-10-CM

## 2025-01-21 DIAGNOSIS — H91.8X3 OTHER SPECIFIED HEARING LOSS OF BOTH EARS: ICD-10-CM

## 2025-01-21 DIAGNOSIS — I10 ESSENTIAL HYPERTENSION: ICD-10-CM

## 2025-01-21 DIAGNOSIS — L82.1 SEBORRHEIC KERATOSES: ICD-10-CM

## 2025-01-21 DIAGNOSIS — E78.49 OTHER HYPERLIPIDEMIA: ICD-10-CM

## 2025-01-21 DIAGNOSIS — R79.89 ELEVATED PLATELET COUNT: ICD-10-CM

## 2025-01-21 DIAGNOSIS — C61 PROSTATE CANCER METASTATIC TO BONE (HCC): ICD-10-CM

## 2025-01-21 DIAGNOSIS — E03.9 ACQUIRED HYPOTHYROIDISM: ICD-10-CM

## 2025-01-21 DIAGNOSIS — J43.2 CENTRILOBULAR EMPHYSEMA (HCC): ICD-10-CM

## 2025-01-21 DIAGNOSIS — H40.033 NARROW ANGLE OF ANTERIOR CHAMBER OF BOTH EYES: ICD-10-CM

## 2025-01-21 DIAGNOSIS — C67.9 UROTHELIAL CARCINOMA OF BLADDER (HCC): ICD-10-CM

## 2025-01-21 DIAGNOSIS — H25.13 AGE-RELATED NUCLEAR CATARACT OF BOTH EYES: ICD-10-CM

## 2025-01-21 DIAGNOSIS — I70.0 ATHEROSCLEROSIS OF AORTA: ICD-10-CM

## 2025-01-21 DIAGNOSIS — C79.51 PROSTATE CANCER METASTATIC TO BONE (HCC): ICD-10-CM

## 2025-01-21 DIAGNOSIS — D22.9 MULTIPLE NEVI: ICD-10-CM

## 2025-01-21 NOTE — PROGRESS NOTES
Subjective:   Samuel Romero is a 79 year old male who presents for a MA AHA (Medicare Advantage Annual Health Assessment) and scheduled follow up of multiple significant but stable problems.     Pt is here with his wife.   Overall doing well.   Appetite good. Diet usually healthy  No regular exercise but tries to stay physically active  Less activity than previous. Has to rest more with activity per wife due to ANGEL..  Does some house projects  Chronic ANGEL, stable per patient.  No cough.  No fevers or chills.  No CP or palpitations.  No abd pain.   Normal BMs  Drinks beer almost daily  Per wife, needs to cut down beer intake  Noted CAGE questionnaire.   Levothyroxine dose increased to 112 mcg recently after labs with elevated TSH.  Taking medication on a regular basis.  No unusual fatigue.  No skin changes.  Sleeping 7 hours/night  Scheduled for cataract surgery bilat in 3/2025.   Chronic hearing loss, stable per patient.  Had recent follow-up/treatment with oncologist.  On systemic therapy with androgen ablation.  Also on abiraterone/prednisone  1-2 times nocturia. No hematuria, stable urine stream, no dysuria.      History/Other:   Fall Risk Assessment:   He has been screened for Falls and is low risk.      Cognitive Assessment:   Abnormal  What day of the week is this?: Correct  What month is it?: Correct  What year is it?: Correct  Recall \"Ball\": Correct  Recall \"Flag\": Incorrect  Recall \"Tree\": Correct    Functional Ability/Status:   Samuel Romero has some abnormal functions as listed below:  He has Hearing problems based on screening of functional status.      Depression Screening (PHQ):  PHQ-2 SCORE: 0  , done 1/21/2025       Advanced Directives:   He does have a Living Will but we do NOT have it on file in Epic.    He does have a POA but we do NOT have it on file in Epic.    Discussed Advance Care Planning with patient (and family/surrogate if present). Standard forms made available to patient in After  Visit Summary.      Patient Active Problem List   Diagnosis    Hypothyroidism    Hearing loss of both ears    Hyperlipidemia    Prostate cancer (HCC)    Essential hypertension    Vitamin D deficiency    Macrocytosis without anemia    Atherosclerosis of aorta    Elevated hemoglobin A1c    Spinal osteoarthritis    Narrow angle of anterior chamber of both eyes    Cataracta    Leukopenia, unspecified type    Dissection of mesenteric artery (HCC)    Centrilobular emphysema (HCC)    Hyperglycemia    Urinary retention    History of prostate cancer    Urothelial carcinoma of bladder (HCC)    Prostate cancer metastatic to bone (HCC)    History of carotid body tumor    Miosis    Age-related nuclear cataract of both eyes     Allergies:  He has No Known Allergies.    Current Medications:  Outpatient Medications Marked as Taking for the 1/21/25 encounter (Office Visit) with Keyla Pérez MD   Medication Sig    predniSONE 10 MG Oral Tab Take 1 tablet (10 mg total) by mouth daily.    Abiraterone Acetate 250 MG Oral Tab take 1 tablet (250mg total) BY MOUTH DAILY with a low fat meal    atorvastatin 10 MG Oral Tab Take one tablet by mouth once daily at night.    Glucos-Chondroit-Hyaluron-MSM (GLUCOSAMINE CHONDROITIN JOINT OR) Take by mouth daily.    losartan 50 MG Oral Tab Take 1 tablet (50 mg total) by mouth daily.    ASPIRIN 81 OR Take 81 mg by mouth daily.       Medical History:  He  has a past medical history of Cancer (HCC), Cancer (HCC) (12/2020), Cataracts, bilateral, COPD (chronic obstructive pulmonary disease) (HCC), COVID-19 virus infection (11/2020), Essential hypertension, Hyperlipidemia, Hypothyroidism, Hypothyroidism (06/29/2012), Narrow angle of anterior chamber of both eyes, Prostate cancer (HCC), and Superior mesenteric artery stenosis (HCC).  Surgical History:  He  has a past surgical history that includes colonoscopy (2008); vasectomy; hernia surgery (2013); tonsillectomy (1953); Yag Iridotomy - OU - Both Eyes  (2019); skin surgery (10/2019); colonoscopy (2016); other surgical history (2016); other surgical history (2017); other surgical history (2020); other surgical history (2021); other surgical history (2022); and other surgical history (2024).   Family History:  His family history includes Breast Cancer in his sister; High Blood Pressure in his sister; Hypertension in his father; Stroke in his father; Thyroid Disorder in his father; allergic rhinitis in his sister; rheumatic heart disease in his mother.  Social History:  He  reports that he quit smoking about 9 years ago. His smoking use included cigarettes. He started smoking about 59 years ago. He has a 25 pack-year smoking history. He quit smokeless tobacco use about 9 years ago. He reports current alcohol use of about 3.0 standard drinks of alcohol per week. He reports current drug use. Drug: Cannabis.    Tobacco:  He smoked tobacco in the past but quit greater than 12 months ago.  Social History     Tobacco Use   Smoking Status Former    Current packs/day: 0.00    Average packs/day: 0.5 packs/day for 50.0 years (25.0 ttl pk-yrs)    Types: Cigarettes    Start date: 5/15/1965    Quit date: 5/15/2015    Years since quittin.7   Smokeless Tobacco Former    Quit date: 2015          CAGE Alcohol Screen:   He has been screened for alcohol abuse and his score is not 0:  Cut: Have you ever felt you should Cut down on your drinking?: No  Annoyed: Have people Annoyed you by criticizing your drinking?: Yes  Guilty: Have you ever felt bad or Guilty about your drinking?: No  Eye Opener: Have you ever had a drink first thing in the morning to steady your nerves or to get rid of a hangover (Eye opener)?: No  Total Score: 1      Patient Care Team:  Keyla Pérez MD as PCP - General (Family Practice)  Curtis Jarrell MD (UROLOGY)  Ananda Rivera (Radiology)  Alejandro Pickett MD (ONCOLOGY)  Gautam Newman Md  (OPHTHALMOLOGY)  Ruddy Upton MD (SURGERY, VASCULAR)  Pina Urrutia MD (Radiation Oncology)  Suzi Irby, RN as Registered Nurse (Registered Nurse)    Review of Systems  GENERAL: feels well otherwise, no unusual fatigue  SKIN: Multiple moles, no change noted.   EYES: cataracts, VS  HEENT: denies nasal congestion, sinus pain or ST  LUNGS:shortness of breath with exertion as above, history of COPD  CARDIOVASCULAR: denies chest pain on exertion  GI: denies abdominal pain, denies heartburn  :as above.  MUSCULOSKELETAL: No significant joint pains  NEURO: denies headaches  PSYCHE: denies depression or anxiety  HEMATOLOGIC: denies h/o anemia  ENDOCRINE: on levothyroxine as above  ALL/ASTHMA: denies hx of allergy or asthma    Objective:   Physical Exam  General Appearance:  Alert, cooperative, no distress, appears stated age   Head:  Normocephalic, without obvious abnormality, atraumatic   Eyes:  PERRL, conjunctiva/corneas clear, EOM's intact   Ears:  Normal TM's and external ear canals, both ears. Elem   Nose: Nares normal, no drainage or sinus tenderness   Throat: Lips, mucosa, and tongue normal; teeth and gums normal,  Throat clear   Neck: Supple, symmetrical, trachea midline, no adenopathy, thyroid: not enlarged, symmetric, no carotid bruits, no JVD  Right lateral neck with 8 mm mobile sebaceous cyst, stable.  Healed scar left upper neck   Back:   Symmetric, ROM normal, NT   Lungs:   Clear to auscultation bilaterally, respirations unlabored, no wheezing, no retractions   Chest Wall:  No tenderness or deformity   Heart:   Regular, rate and rhythm, S1, S2 normal, no murmur, rub or gallop   Abdomen:   Soft, non-tender, bowel sounds active all four quadrants,  no masses, no organomegaly   Genitalia: deferred   Rectal: deferred   Extremities: Extremities normal, atraumatic, no cyanosis or edema   Pulses: 2+ and symmetric   Skin: Skin color, texture, turgor normal, scattered lentinginous lesions on face and  extremities, multiple moles, SKs   Lymph nodes: Cervical, supraclavicular, and axillary nodes normal   Neurologic:  Alert and oriented ×3, no focal deficits.  Normal gait     /80   Pulse 66   Temp 98.2 °F (36.8 °C) (Oral)   Resp 16   Ht 5' 5.98\" (1.676 m)   Wt 151 lb (68.5 kg)   SpO2 97%   BMI 24.39 kg/m²  Estimated body mass index is 24.39 kg/m² as calculated from the following:    Height as of this encounter: 5' 5.98\" (1.676 m).    Weight as of this encounter: 151 lb (68.5 kg).    Medicare Hearing Assessment:   Hearing Screening    Time taken: 1/21/2025  6:03 PM  Entry User: Keyla Pérez MD  Screening Method: Finger Rub  Finger Rub Result: Fail (Comment: L pass, R fail)         Visual Acuity:   Right Eye Visual Acuity: Uncorrected Right Eye Chart Acuity: 20/40   Left Eye Visual Acuity: Uncorrected Left Eye Chart Acuity: 20/40   Both Eyes Visual Acuity: Uncorrected Both Eyes Chart Acuity: 20/40   Able To Tolerate Visual Acuity: Yes        Assessment & Plan:   Samuel Romero is a 79 year old male who presents for a Medicare Assessment.     1. Encounter for annual health examination (Primary)  Reviewed diet/exercise/skin care/safety/fall precautions/activities to keep cognition strong  Reviewed personal preventive plan services.  Immunizations Reviewed: Advised annual flu vaccine, patient defers, understanding risk of not being vaccinated.  Up-to-date with pneumococcal vaccines and Shingrix vaccine series.  Discussed COVID-19 booster and RSV vaccines.  Colonoscopy up-to-date  2. Acquired hypothyroidism  Recently increase levothyroxine to 112 mcg daily after TSH slightly elevated months.  Plan recheck labs in about 2 months.  Reviewed taking medication first thing in the morning, and empty stomach.  No vitamins/supplements for at least 6 hours after medication dose  3. Essential hypertension  Blood pressure controlled.  Recent CMP WNL.  Continue current medication.  4. Atherosclerosis of  aorta  Continue aspirin and statin.  Keep blood pressure, blood sugar and lipids optimized.  5. Other hyperlipidemia  Lipids at goal.  Continue statin.  LFTs normal.  Reviewed diet and exercise.  6. Prostate cancer metastatic to bone (HCC)  Patient up-to-date with oncology follow-up.  PSA undetectable.  Tolerating therapy.  On zoledronic acid for bone metastases.  7. Urothelial carcinoma of bladder (HCC)  Patient to have annual cystoscopy.  Last done 7/2024.  8. Centrilobular emphysema (HCC)  Chronic ANGEL, stable.  Has not tolerated inhalers in the past and defers retrial.  Monitor.  9. Dissection of mesenteric artery (HCC)  Patient has seen vascular specialist and defers further vascular studies.  Risk factor modification.  Continue aspirin and statin.  10. Age-related nuclear cataract of both eyes  Noted visually significant cataracts and scheduled for surgery in 3/2025  11. Narrow angle of anterior chamber of both eyes  Monitored per Opthalmology  12. Other specified hearing loss of both ears  Advised hearing eval and HAs, pt defers.   13. Elevated platelet count  Noted slightly elevated platelet count.  Hemoglobin and WBC counts normal.  Limit alcohol use.  Monitor.  14. Macrocytosis without anemia  Hemoglobin normal.  Previous elevations noted with excess alcohol intake.  Reviewed alcohol intake with patient and advised to limit.  Reviewed CAGE questionnaire.  Monitor.  15. Multiple nevi  Reviewed regular sunscreen use and monitoring of moles.  Defers dermatology evaluation.  16. Seborrheic keratoses  Clinically benign  17. Encounter for medication monitoring  The patient indicates understanding of these issues and agrees to the plan.  Reinforced healthy diet, lifestyle, and exercise.      Return in 7 weeks (on 3/11/2025) for Follow-up Medication, Follow-up Test Results.     Keyla Pérez MD, 1/21/2025     Supplementary Documentation:   General Health:  In the past six months, have you lost more than 10  pounds without trying?: 2 - No  Has your appetite been poor?: No  Type of Diet: Balanced  How does the patient maintain a good energy level?: Other  How would you describe your daily physical activity?: Light  How would you describe your current health state?: Good  How do you maintain positive mental well-being?: Games  On a scale of 0 to 10, with 0 being no pain and 10 being severe pain, what is your pain level?: 1 - (Mild)  In the past six months, have you experienced urine leakage?: 0-No  At any time do you feel concerned for the safety/well-being of yourself and/or your children, in your home or elsewhere?: No  Have you had any immunizations at another office such as Influenza, Hepatitis B, Tetanus, or Pneumococcal?: No    Health Maintenance   Topic Date Due    Lung Cancer Screening  Never done    COVID-19 Vaccine (3 - 2024-25 season) 09/01/2024    Annual Well Visit  01/01/2025    Annual Depression Screening  01/01/2025    Influenza Vaccine (1) 06/30/2025 (Originally 10/1/2024)    Fall Risk Screening (Annual)  Completed    Pneumococcal Vaccine: 50+ Years  Completed    Zoster Vaccines  Completed    Meningococcal B Vaccine  Aged Out    Colorectal Cancer Screening  Discontinued

## 2025-01-23 ENCOUNTER — TELEPHONE (OUTPATIENT)
Age: 80
End: 2025-01-23

## 2025-01-23 NOTE — TELEPHONE ENCOUNTER
Dentist phoned in, seeing pt today for infection tooth, #12. Will need extraction.   Reviewed with Dr Pickett and with zometa infusion on 1/13/25. Extraction should not happen for 2 months.   Tx with abx for now.   Dentist in agreement, that is their policy too.

## 2025-01-29 ENCOUNTER — OFFICE VISIT (OUTPATIENT)
Dept: FAMILY MEDICINE CLINIC | Facility: CLINIC | Age: 80
End: 2025-01-29
Payer: COMMERCIAL

## 2025-01-29 DIAGNOSIS — Z51.81 ENCOUNTER FOR MEDICATION MONITORING: Primary | ICD-10-CM

## 2025-01-29 PROCEDURE — 1160F RVW MEDS BY RX/DR IN RCRD: CPT | Performed by: FAMILY MEDICINE

## 2025-01-29 PROCEDURE — 1159F MED LIST DOCD IN RCRD: CPT | Performed by: FAMILY MEDICINE

## 2025-02-03 RX ORDER — MOXIFLOXACIN 5 MG/ML
SOLUTION/ DROPS OPHTHALMIC
COMMUNITY
Start: 2025-01-15

## 2025-02-03 RX ORDER — PREDNISOLONE ACETATE 10 MG/ML
SUSPENSION/ DROPS OPHTHALMIC
COMMUNITY
Start: 2025-01-15

## 2025-03-08 LAB — TSH W/REFLEX TO FT4: 3.85 MIU/L (ref 0.4–4.5)

## 2025-03-11 ENCOUNTER — OFFICE VISIT (OUTPATIENT)
Dept: FAMILY MEDICINE CLINIC | Facility: CLINIC | Age: 80
End: 2025-03-11
Payer: COMMERCIAL

## 2025-03-11 VITALS
SYSTOLIC BLOOD PRESSURE: 128 MMHG | BODY MASS INDEX: 24.45 KG/M2 | HEART RATE: 84 BPM | DIASTOLIC BLOOD PRESSURE: 80 MMHG | HEIGHT: 65.98 IN | OXYGEN SATURATION: 96 % | WEIGHT: 152.13 LBS | TEMPERATURE: 98 F | RESPIRATION RATE: 16 BRPM

## 2025-03-11 DIAGNOSIS — I10 ESSENTIAL HYPERTENSION: ICD-10-CM

## 2025-03-11 DIAGNOSIS — C67.9 UROTHELIAL CARCINOMA OF BLADDER (HCC): ICD-10-CM

## 2025-03-11 DIAGNOSIS — H25.13 AGE-RELATED NUCLEAR CATARACT OF BOTH EYES: Primary | ICD-10-CM

## 2025-03-11 DIAGNOSIS — E03.9 ACQUIRED HYPOTHYROIDISM: ICD-10-CM

## 2025-03-11 DIAGNOSIS — J43.2 CENTRILOBULAR EMPHYSEMA (HCC): ICD-10-CM

## 2025-03-11 DIAGNOSIS — Z01.818 PREOP EXAMINATION: ICD-10-CM

## 2025-03-11 DIAGNOSIS — Z51.81 ENCOUNTER FOR MEDICATION MONITORING: ICD-10-CM

## 2025-03-11 DIAGNOSIS — E78.49 OTHER HYPERLIPIDEMIA: ICD-10-CM

## 2025-03-11 DIAGNOSIS — C61 PROSTATE CANCER (HCC): ICD-10-CM

## 2025-03-11 PROCEDURE — 1159F MED LIST DOCD IN RCRD: CPT | Performed by: FAMILY MEDICINE

## 2025-03-11 PROCEDURE — 3074F SYST BP LT 130 MM HG: CPT | Performed by: FAMILY MEDICINE

## 2025-03-11 PROCEDURE — 99214 OFFICE O/P EST MOD 30 MIN: CPT | Performed by: FAMILY MEDICINE

## 2025-03-11 PROCEDURE — 3079F DIAST BP 80-89 MM HG: CPT | Performed by: FAMILY MEDICINE

## 2025-03-11 PROCEDURE — 1160F RVW MEDS BY RX/DR IN RCRD: CPT | Performed by: FAMILY MEDICINE

## 2025-03-11 PROCEDURE — G2211 COMPLEX E/M VISIT ADD ON: HCPCS | Performed by: FAMILY MEDICINE

## 2025-03-11 PROCEDURE — 3008F BODY MASS INDEX DOCD: CPT | Performed by: FAMILY MEDICINE

## 2025-03-11 RX ORDER — LEVOTHYROXINE SODIUM 112 UG/1
112 TABLET ORAL
Qty: 90 TABLET | Refills: 1 | Status: SHIPPED | OUTPATIENT
Start: 2025-03-11

## 2025-03-11 RX ORDER — ATORVASTATIN CALCIUM 10 MG/1
10 TABLET, FILM COATED ORAL NIGHTLY
Qty: 90 TABLET | Refills: 2 | Status: SHIPPED | OUTPATIENT
Start: 2025-03-11

## 2025-03-11 RX ORDER — LOSARTAN POTASSIUM 50 MG/1
50 TABLET ORAL DAILY
Qty: 90 TABLET | Refills: 2 | Status: SHIPPED | OUTPATIENT
Start: 2025-03-11

## 2025-03-11 NOTE — PROGRESS NOTES
Samuel Romero is a 79 year old male.  HPI:  Pt is here for pre-op H&P/medical clearance requested by Dr. Newman.  Patient is scheduled for bilateral cataract extraction,  R to be done on 3/20/2025 and L on 4/10/2025.  Cataracts are visually and medically significant.  Patient with narrow angles and shallow chambers despite patent laser peripheral iridotomy done per ophthalmology.  Cataract extraction medically indicated for this.  No complications with anesthesia with previous surgeries.  No recent URI sxs.  Denies any cough or congestion.  No fever or chills  No dysuria  No CP, no palpitations  Chronic ANGEL, stable with history of COPD.  Normal BMs.  Tolerating medications without side effects.      Current Outpatient Medications   Medication Sig Dispense Refill    levothyroxine 112 MCG Oral Tab Take 1 tablet (112 mcg total) by mouth every morning before breakfast. 90 tablet 1    losartan 50 MG Oral Tab Take 1 tablet (50 mg total) by mouth daily. 90 tablet 2    atorvastatin 10 MG Oral Tab Take 1 tablet (10 mg total) by mouth nightly. 90 tablet 2    predniSONE 10 MG Oral Tab Take 1 tablet (10 mg total) by mouth daily. 30 tablet 3    Abiraterone Acetate 250 MG Oral Tab take 1 tablet (250mg total) BY MOUTH DAILY with a low fat meal 30 tablet 3    Glucos-Chondroit-Hyaluron-MSM (GLUCOSAMINE CHONDROITIN JOINT OR) Take by mouth daily.      Cholecalciferol (VITAMIN D3) 1000 UNITS Oral Cap Take 1 tablet by mouth daily.                                Past Medical History:    Cancer (HCC)    Prostate    Cancer (HCC)    Urothelial Cancer:low grade    Cataracts, bilateral    COPD (chronic obstructive pulmonary disease) (HCC)    COVID-19 virus infection    Essential hypertension    Hyperlipidemia    Hypothyroidism    Hypothyroidism    Narrow angle of anterior chamber of both eyes    Prostate cancer (HCC)    Superior mesenteric artery stenosis (HCC)       Past Surgical History:   Procedure Laterality Date    Colonoscopy  2008     Colonoscopy  2016    Normal, recheck 10 yrs    Hernia surgery      inguinal    Other surgical history  2016    Robotic Assisted Lap Prostatectomy/Pelvic lymphadenoctomy    Other surgical history  2017    cyst excision left jaw    Other surgical history  2020    Cystoscopy under anesthesia, clot evacuation, bladder biopsy, fulguration, bilateral retrograde pyelography, fluoroscopic interpretation, 20 [place]awk three-way catheter placement for continuous bladder irrigation.    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      Other surgical history  2022    Cystoscopy Dr. Peña    Other surgical history  2024    No sig findings. recheck one year    Skin surgery  10/2019    skin lesion biopsy x 2, benign    Tonsillectomy      Vasectomy      Yag iridotomy - ou - both eyes  2019         Social History     Socioeconomic History    Marital status:    Tobacco Use    Smoking status: Former     Current packs/day: 0.00     Average packs/day: 0.5 packs/day for 50.0 years (25.0 ttl pk-yrs)     Types: Cigarettes     Start date: 5/15/1965     Quit date: 5/15/2015     Years since quittin.8    Smokeless tobacco: Former     Quit date: 2015   Vaping Use    Vaping status: Never Used   Substance and Sexual Activity    Alcohol use: Yes     Alcohol/week: 3.0 standard drinks of alcohol     Types: 3 Cans of beer per week     Comment: per night    Drug use: Yes     Types: Cannabis     Comment: 1 x per week   Other Topics Concern    Seat Belt Yes     Social Drivers of Health     Food Insecurity: No Food Insecurity (2025)    NCSS - Food Insecurity     Worried About Running Out of Food in the Last Year: No     Ran Out of Food in the Last Year: No   Transportation Needs: No Transportation Needs (2025)    NCSS - Transportation     Lack of Transportation: No   Housing Stability: At Risk (2025)    NCSS - Housing/Utilities      Has Housing: Yes     Worried About Losing Housing: No     Unable to Get Utilities: Yes                   REVIEW OF SYSTEMS:  GENERAL HEALTH: feels well otherwise, mood is good  SKIN: denies any unusual skin lesions or rashes  RESPIRATORY: as above  CARDIOVASCULAR: denies chest pain on exertion  GI: denies abdominal pain and denies heartburn  : No change in bladder.  History of urothelial CA and metastatic prostate CA.  On treatment as per oncology  NEURO: denies headaches, denies dizziness    EXAM:  /80   Pulse 84   Temp 97.8 °F (36.6 °C) (Temporal)   Resp 16   Ht 5' 5.98\" (1.676 m)   Wt 152 lb 2 oz (69 kg)   SpO2 96%   BMI 24.57 kg/m²   Wt Readings from Last 6 Encounters:   03/11/25 152 lb 2 oz (69 kg)   01/21/25 151 lb (68.5 kg)   01/13/25 150 lb (68 kg)   11/15/24 150 lb (68 kg)   10/17/24 148 lb 9.6 oz (67.4 kg)   08/19/24 152 lb (68.9 kg)     GENERAL: well developed, well nourished,in no apparent distress, pleasant affect  SKIN: no rashes,  HEENT: atraumatic, normocephalic,  Conj clear, EOMI  OMM, throat clear  NECK: supple,no adenopathy,noTM  Right lateral neck with small seb cyst about 8 mm size, nontender (chronic and stable)   LUNGS: clear to auscultation, no wheezing  CARDIO: RRR without murmur, normal S1, S2, no ectopy  GI: NABS, soft, no tenderness,diastasis, no masses, no HSM, ND  EXTREMITIES: no cyanosis, clubbing or edema  NEURO: A&O x3, no focal deficits  Normal gait    ASSESSMENT AND PLAN:    1. Age-related nuclear cataract of both eyes  2. Preop examination  Patient is scheduled for cataract extraction on 3/20/2025 and 4/10/2025 per Dr. Newman.  Recent CBC and CMP okay.  Okay to hold baby aspirin starting 1 week prior to procedures and can restart after surgeries are completed.  Avoid any NSAIDs and hold glucosamine supplement as well starting 1 week prior to surgery.  Patient is at acceptable risk for procedure and medically cleared to proceed as scheduled.    3. Essential  hypertension  Blood pressure controlled.  Continue current medication.  - losartan 50 MG Oral Tab; Take 1 tablet (50 mg total) by mouth daily.  Dispense: 90 tablet; Refill: 2    4. Acquired hypothyroidism  TSH now normal.  Continue current levothyroxine dose.    5. Centrilobular emphysema (HCC)  Chronic ANGEL, stable. Has not tolerated inhalers in the past . Normal lung exam and O2 Sats.     6. Other hyperlipidemia  Lipids at goal. Continue statin. LFTs normal.     7. Prostate cancer (HCC)  Patient with metastatic prostate CA.  Had recent follow-up with oncology.  PSA is undetectable.  Patient on systemic therapy/androgen ablation tx.    8.  Urothelial cancer of bladder (HCC)  Had had tx. Patient to have annual cystoscopy. Last done 7/2024.     9.  Encounter for medication monitoring

## 2025-03-17 ENCOUNTER — MED REC SCAN ONLY (OUTPATIENT)
Dept: FAMILY MEDICINE CLINIC | Facility: CLINIC | Age: 80
End: 2025-03-17

## 2025-04-06 NOTE — PROGRESS NOTES
[facility] Progress Note       Patient Name: Samuel Romero   YOB: 1945  Medical Record Number: [id number]  Attending Physician: Alejandro Pickett M.D.     The 21st Century Cures Act makes medical notes like these available to patients in the interest of transparency. Please be advised this is a medical document. Medical documents are intended to carry relevant information, facts as evident, and the clinical opinion of the practitioner. The medical note is intended as peer to peer communication and may appear blunt or direct. It is written in medical language and may contain abbreviations or verbiage that are unfamiliar.     Date of Visit: 4/7/2025       Chief Complaint  Prostate cancer - follow up.     Oncologic History  Samuel Romero is a 79 year old male referred for thrombocytosis. A review of the patient's laboratory studies in the Epic system show a normal platelet count of 308 in 04/2014. By 05/2015 the platelet count had increased to 528 K/mcl. Over time it continued to increase to its most recent value of 911 K/mcl on 10/11/2016. Patient has a history of prostate cancer for which he underwent prostatectomy in 02/2016 followed by external beam radiotherapy and short term androgen ablation. He had a colonoscopy in 2016 which was unremarkable.     Patient was empirically started on hydroxyurea while workup was initiated. Peripheral blood for JAK2, CALR, and MPL and BCR/ABL ultimately returned as negative. Bone marrow biopsy was performed but was not diagnostic. Repeat bone marrow biopsy was unremarkable.     It was then discovered that patient was drinking at least 6 cans of beer per day. He was asked to decrease his alcohol intake. With that his complete blood count normalized.     Routine follow up PSA on 09/30/2021 was 0.15 ng/ml as compared to 0.02 ng/ml on 03/09/2021. PSA was repeated on 01/10/2022 and had increased to 0.40 ng/ml. PSMA PET scan on  01/21/2022 showed uptake in a solitary bone lesion involving the 10th right rib.     From 02/21/2022 to 02/25/2022 he received radiation therapy to the right rib lesion.    From 07/17/2023 to 07/21/2023 he received SBRT to a left rib lesion after an increase in PSA.    Genetic testing performed on 03/07/2022 showed no known pathogenic variants in 84 genes including: AIP, ALK, APC, COLTON, AXIN2, BAP1, BARD1, BLM, BMPR1A, BRCA1, BRCA2, BRIP1, CASR, CDC73, CDH1, CDK4, CDKN1B, CDKN1C, CDKN2A (p14ARF), CDKN2A (p.64JCX6h), CEBPA, CHEK2, DICER1, DIS3L2, EGFR (sequencing only), FH, FLCN, GATA2, GPC3, GREM1 (promoter region deletion/duplication testing only), HOXB13 (sequencing only), HRAS, KIT,MAX,  MEN1, MET, MITF (sequencing only), MLH1, MSH2 (including EPCAM rearrangement testing), MSH6, MUTYH, NBN, NF1, NF2, PALB2, PDGRFA, PHOX2B, PMS2, POLD1, POLE, POT1, PSCBQ7D, PTCH1, PTEN, RAD50, RAD51C, RAD51D, RB1, RECQL4, RET, RUNX1, SDHAF2, SDHA (sequencing only), SDHB, SDHC, SDHD, SMAD4, SMARCA4, SMARCB1, SMARCE1, STK11, SUFU, TERC, TERT, WUCJ752, TP53, TSC1, TSC2, VHL, WRN, and WT1.  Four variants of uncertain significance, FLCN c.850G>A (p.Zzo517Kuz)(possibly mosaic), MSH6 c.1685A>G (p.Apv361Iaw), PALB2 c.37G>A (p.Zcr86Rvy), and WRN c.1531G>C (p.Jil006Nym), were identified.      On 09/2021 he was found to have new increase in PSA. In 02/2022, he underwent radiation therapy to a right rib lesion as the only site identified on a PSMA PET scan. Following radiation, however, his PSA did not decrease. In 07/2023 he underwent SBRT to a left rib lesion.     In 11/2023, a follow up PSMA PET scan was ordered but patient did not schedule. Instead, he sought an opinion at [facility] where a PSMA PET scan was ordered. This was performed on 01/08/2024 which showed multiple bone metastases.     PSA on 01/18/2024 was 8.43 ng/ml.     On 01/18/2024 he began systemic therapy with androgen ablation with triptorelin and soon after  abiraterone/prednisone were added.     History of Present Illness  Patient returns for follow up. He has no complaints. He reports mild hot flashes. No change in mood. He is taking abiraterone with a low fat meal.     Performance Status   Karnofsky 100% - Normal, no complaints.    Past Medical History (historical data, reviewed by physician)  Essential hypertension; hypothyroidism; hyperlipidemia; prostate cancer (Evelyn 4+5=9, [id number]) s/p prostatectomy and radiation therapy and short term androgen ablation; COVID 19; non-invasive low grade papillary urothelial carcinoma; paraganglioma.     Past Surgical History (historical data, reviewed by physician)  Tonsillectomy; vasectomy; robotic assisted laparoscopic prostatectomy and pelvic lymphadenectomy; inguinal hernia repair; TURBT; left neck paraganglioma.     Family History (historical data, reviewed by physician)  Sister with breast cancer; sister with colon cancer; father with prostate cancer.    Social History (historical data, reviewed by physician)  Previous tobacco use but quit 2015; uses alcohol daily.      Current Medications    levothyroxine 112 MCG Oral Tab Take 1 tablet (112 mcg total) by mouth every morning before breakfast. 90 tablet 1    losartan 50 MG Oral Tab Take 1 tablet (50 mg total) by mouth daily. 90 tablet 2    atorvastatin 10 MG Oral Tab Take 1 tablet (10 mg total) by mouth nightly. 90 tablet 2    predniSONE 10 MG Oral Tab Take 1 tablet (10 mg total) by mouth daily. 30 tablet 3    Abiraterone Acetate 250 MG Oral Tab take 1 tablet (250mg total) BY MOUTH DAILY with a low fat meal 30 tablet 3    Cholecalciferol (VITAMIN D3) 1000 UNITS Oral Cap Take 1 tablet by mouth daily.       Allergies   Mr. Romero has No Known Allergies.    Vital Signs   /87 (BP Location: Left arm, Patient Position: Sitting, Cuff Size: adult)   Pulse 82   Temp 97.3 °F (36.3 °C) (Temporal)   Resp 16   Ht 1.676 m (5' 5.98\")   Wt 68.3 kg (150 lb 9.6 oz)   SpO2 (!)  89%   BMI 24.32 kg/m²     Physical Examination   Constitutional Well developed, well nourished. No apparent distress.   Head  Normocephalic and atraumatic.  Eyes                  Conjunctiva clear; sclera anicteric.  ENMT  External nose normal; external ears normal.  Respiratory Normal effort; no respiratory distress; clear to auscultation bilaterally.  Cardiovascular Regular rate and rhythm; normal S1S2.  Abdomen Soft; not tender; no masses.   Extremities No lower extremity edema.   Neurologic Motor and sensory grossly intact.  Psychiatric Mood and affect appropriate.    Laboratory  Recent Results (from the past week)   PSA - DIAGNOSTIC [E]    Collection Time: 04/07/25  9:23 AM   Result Value Ref Range    Total PSA  <0.04 <=4.00 ng/mL   CBC W/DIFF [E]    Collection Time: 04/07/25  9:23 AM   Result Value Ref Range    WBC 5.0 4.0 - 11.0 x10(3) uL    RBC 4.43 3.80 - 5.80 x10(6)uL    HGB 14.5 13.0 - 17.5 g/dL    HCT 43.7 39.0 - 53.0 %    .0 (L) 150.0 - 450.0 10(3)uL    MCV 98.6 80.0 - 100.0 fL    MCH 32.7 26.0 - 34.0 pg    MCHC 33.2 31.0 - 37.0 g/dL    RDW 12.3 %    Neutrophil Absolute Prelim 2.36 1.50 - 7.70 x10 (3) uL    Neutrophil Absolute 2.36 1.50 - 7.70 x10(3) uL    Lymphocyte Absolute 1.37 1.00 - 4.00 x10(3) uL    Monocyte Absolute 1.15 (H) 0.10 - 1.00 x10(3) uL    Eosinophil Absolute 0.02 0.00 - 0.70 x10(3) uL    Basophil Absolute 0.02 0.00 - 0.20 x10(3) uL    Immature Granulocyte Absolute 0.09 0.00 - 1.00 x10(3) uL    Neutrophil % 47.1 %    Lymphocyte % 27.3 %    Monocyte % 23.0 %    Eosinophil % 0.4 %    Basophil % 0.4 %    Immature Granulocyte % 1.8 %   COMP METABOLIC PANEL [E]    Collection Time: 04/07/25  9:23 AM   Result Value Ref Range    Glucose 106 (H) 70 - 99 mg/dL    Sodium 143 136 - 145 mmol/L    Potassium 3.8 3.5 - 5.1 mmol/L    Chloride 107 98 - 112 mmol/L    CO2 28.0 21.0 - 32.0 mmol/L    Anion Gap 8 0 - 18 mmol/L    BUN 8 (L) 9 - 23 mg/dL    Creatinine 0.74 0.70 - 1.30 mg/dL    Calcium,  Total 10.1 8.7 - 10.6 mg/dL    Calculated Osmolality 295 275 - 295 mOsm/kg    eGFR-Cr 93 >=60 mL/min/1.73m2    AST 15 <34 U/L    ALT 14 10 - 49 U/L    Alkaline Phosphatase 55 45 - 117 U/L    Bilirubin, Total 1.0 0.2 - 1.1 mg/dL    Total Protein 6.6 5.7 - 8.2 g/dL    Albumin 4.5 3.2 - 4.8 g/dL    Globulin  2.1 2.0 - 3.5 g/dL    A/G Ratio 2.1 (H) 1.0 - 2.0    Patient Fasting for CMP? Patient not present      Impression and Plan   1.   Prostate cancer, metastatic: PSA is undetectable. He is tolerating therapy well. Continue combined androgen ablation with triptorelin and abiraterone/prednisone. Triptorelin today.     2.   Bone metastasis: Zoledronic acid due today but he had a dental extraction last month. Will hold and restart at next visit.     3.   Hypoxia: Patient has documented emphysematous changes on CT scan. He previously was advised to see pulmonology but declined. He again declines referral today.     Planned Follow Up   Patient will return for follow up and therapy in 3 months.    Electronically Signed by:     Alejandro Pickett M.D.  System Medical Director, Oncology Services  [facility]

## 2025-04-07 ENCOUNTER — OFFICE VISIT (OUTPATIENT)
Age: 80
End: 2025-04-07
Attending: INTERNAL MEDICINE
Payer: MEDICARE

## 2025-04-07 VITALS
DIASTOLIC BLOOD PRESSURE: 87 MMHG | SYSTOLIC BLOOD PRESSURE: 147 MMHG | OXYGEN SATURATION: 89 % | RESPIRATION RATE: 16 BRPM | WEIGHT: 150.63 LBS | BODY MASS INDEX: 24.21 KG/M2 | TEMPERATURE: 97 F | HEART RATE: 82 BPM | HEIGHT: 65.98 IN

## 2025-04-07 DIAGNOSIS — C61 PROSTATE CANCER (HCC): Primary | ICD-10-CM

## 2025-04-07 DIAGNOSIS — C79.51 PROSTATE CANCER METASTATIC TO BONE (HCC): ICD-10-CM

## 2025-04-07 DIAGNOSIS — Z79.899 ON ANDROGEN ABLATION THERAPY: ICD-10-CM

## 2025-04-07 DIAGNOSIS — E03.9 ACQUIRED HYPOTHYROIDISM: ICD-10-CM

## 2025-04-07 DIAGNOSIS — C61 PROSTATE CANCER METASTATIC TO BONE (HCC): ICD-10-CM

## 2025-04-07 LAB
ALBUMIN SERPL-MCNC: 4.5 G/DL (ref 3.2–4.8)
ALBUMIN/GLOB SERPL: 2.1 {RATIO} (ref 1–2)
ALP LIVER SERPL-CCNC: 55 U/L
ALT SERPL-CCNC: 14 U/L
ANION GAP SERPL CALC-SCNC: 8 MMOL/L (ref 0–18)
AST SERPL-CCNC: 15 U/L (ref ?–34)
BASOPHILS # BLD AUTO: 0.02 X10(3) UL (ref 0–0.2)
BASOPHILS NFR BLD AUTO: 0.4 %
BILIRUB SERPL-MCNC: 1 MG/DL (ref 0.2–1.1)
BUN BLD-MCNC: 8 MG/DL (ref 9–23)
CALCIUM BLD-MCNC: 10.1 MG/DL (ref 8.7–10.6)
CHLORIDE SERPL-SCNC: 107 MMOL/L (ref 98–112)
CO2 SERPL-SCNC: 28 MMOL/L (ref 21–32)
CREAT BLD-MCNC: 0.74 MG/DL
EGFRCR SERPLBLD CKD-EPI 2021: 93 ML/MIN/1.73M2 (ref 60–?)
EOSINOPHIL # BLD AUTO: 0.02 X10(3) UL (ref 0–0.7)
EOSINOPHIL NFR BLD AUTO: 0.4 %
ERYTHROCYTE [DISTWIDTH] IN BLOOD BY AUTOMATED COUNT: 12.3 %
GLOBULIN PLAS-MCNC: 2.1 G/DL (ref 2–3.5)
GLUCOSE BLD-MCNC: 106 MG/DL (ref 70–99)
HCT VFR BLD AUTO: 43.7 %
HGB BLD-MCNC: 14.5 G/DL
IMM GRANULOCYTES # BLD AUTO: 0.09 X10(3) UL (ref 0–1)
IMM GRANULOCYTES NFR BLD: 1.8 %
LYMPHOCYTES # BLD AUTO: 1.37 X10(3) UL (ref 1–4)
LYMPHOCYTES NFR BLD AUTO: 27.3 %
MCH RBC QN AUTO: 32.7 PG (ref 26–34)
MCHC RBC AUTO-ENTMCNC: 33.2 G/DL (ref 31–37)
MCV RBC AUTO: 98.6 FL
MONOCYTES # BLD AUTO: 1.15 X10(3) UL (ref 0.1–1)
MONOCYTES NFR BLD AUTO: 23 %
NEUTROPHILS # BLD AUTO: 2.36 X10 (3) UL (ref 1.5–7.7)
NEUTROPHILS # BLD AUTO: 2.36 X10(3) UL (ref 1.5–7.7)
NEUTROPHILS NFR BLD AUTO: 47.1 %
OSMOLALITY SERPL CALC.SUM OF ELEC: 295 MOSM/KG (ref 275–295)
PLATELET # BLD AUTO: 145 10(3)UL (ref 150–450)
POTASSIUM SERPL-SCNC: 3.8 MMOL/L (ref 3.5–5.1)
PROT SERPL-MCNC: 6.6 G/DL (ref 5.7–8.2)
PSA SERPL-MCNC: <0.04 NG/ML (ref ?–4)
RBC # BLD AUTO: 4.43 X10(6)UL
SODIUM SERPL-SCNC: 143 MMOL/L (ref 136–145)
WBC # BLD AUTO: 5 X10(3) UL (ref 4–11)

## 2025-04-07 NOTE — PROGRESS NOTES
Education Record    Learner:  Patient and Spouse    Disease / Diagnosis: Prostate Cancer    Barriers / Limitations:  None   Comments:    Method:  Brief focused   Comments:    General Topics:  Plan of care reviewed   Comments:    Outcome:  Shows understanding   Comments:    Per md, no zometa today. Will return in 3 months for trelstar and zometa.

## 2025-04-07 NOTE — PROGRESS NOTES
Patient is here for 3 month MD follow up for Prostate Cancer. Right eye cataract surgery completed on March 10th. Left eye surgery is scheduled this Thursday. O2 saturation is 89% today. Patient has not followed up with specialist and does not want to at this time. He reports occasional SOB with exertion but manageable. Denies cough or chest pain. Due for Trelstar and Zometa today. He continues on Abiraterone 250 mg/Prednisone 10 mg daily.       Education Record    Learner:  Patient    Disease / Diagnosis: Prostate cancer     Barriers / Limitations:  None   Comments:    Method:  Discussion   Comments:    General Topics:  Plan of care reviewed   Comments:    Outcome:  Shows understanding   Comments:

## 2025-04-09 ENCOUNTER — TELEPHONE (OUTPATIENT)
Age: 80
End: 2025-04-09

## 2025-04-25 ENCOUNTER — TELEPHONE (OUTPATIENT)
Dept: FAMILY MEDICINE CLINIC | Facility: CLINIC | Age: 80
End: 2025-04-25

## 2025-04-25 DIAGNOSIS — Z51.81 ENCOUNTER FOR MEDICATION MONITORING: ICD-10-CM

## 2025-04-25 DIAGNOSIS — R73.03 PREDIABETES: ICD-10-CM

## 2025-04-25 DIAGNOSIS — I10 ESSENTIAL HYPERTENSION: Primary | ICD-10-CM

## 2025-04-25 DIAGNOSIS — E78.49 OTHER HYPERLIPIDEMIA: ICD-10-CM

## 2025-04-25 DIAGNOSIS — E03.9 ACQUIRED HYPOTHYROIDISM: ICD-10-CM

## 2025-04-25 NOTE — TELEPHONE ENCOUNTER
Pt in office with wife and needs lab orders to get done prior to next ov in June 2025.  Orders entered.

## 2025-05-07 DIAGNOSIS — C61 PROSTATE CANCER METASTATIC TO BONE (HCC): ICD-10-CM

## 2025-05-07 DIAGNOSIS — C79.51 PROSTATE CANCER METASTATIC TO BONE (HCC): ICD-10-CM

## 2025-05-08 RX ORDER — ABIRATERONE ACETATE 250 MG/1
TABLET ORAL
Qty: 30 TABLET | Refills: 3 | Status: SHIPPED | OUTPATIENT
Start: 2025-05-08 | End: 2025-08-06

## 2025-05-08 RX ORDER — PREDNISONE 10 MG/1
10 TABLET ORAL DAILY
Qty: 30 TABLET | Refills: 3 | Status: SHIPPED | OUTPATIENT
Start: 2025-05-08 | End: 2025-08-06

## 2025-06-10 LAB
BUN: 11 MG/DL (ref 7–25)
CALCIUM: 9.5 MG/DL (ref 8.6–10.3)
CARBON DIOXIDE: 32 MMOL/L (ref 20–32)
CHLORIDE: 106 MMOL/L (ref 98–110)
CHOL/HDLC RATIO: 1.9 (CALC)
CHOLESTEROL, TOTAL: 184 MG/DL
CREATININE: 0.73 MG/DL (ref 0.7–1.28)
EGFR: 94 ML/MIN/1.73M2
GLUCOSE: 98 MG/DL (ref 65–99)
HDL CHOLESTEROL: 99 MG/DL
HEMOGLOBIN A1C: 6.1 %
LDL-CHOLESTEROL: 71 MG/DL (CALC)
NON-HDL CHOLESTEROL: 85 MG/DL (CALC)
POTASSIUM: 4.4 MMOL/L (ref 3.5–5.3)
SODIUM: 143 MMOL/L (ref 135–146)
TRIGLYCERIDES: 64 MG/DL
TSH W/REFLEX TO FT4: 2.33 MIU/L (ref 0.4–4.5)

## 2025-06-13 ENCOUNTER — TELEPHONE (OUTPATIENT)
Dept: FAMILY MEDICINE CLINIC | Facility: CLINIC | Age: 80
End: 2025-06-13

## 2025-06-13 ENCOUNTER — OFFICE VISIT (OUTPATIENT)
Dept: FAMILY MEDICINE CLINIC | Facility: CLINIC | Age: 80
End: 2025-06-13
Payer: COMMERCIAL

## 2025-06-13 VITALS
BODY MASS INDEX: 24.47 KG/M2 | SYSTOLIC BLOOD PRESSURE: 132 MMHG | RESPIRATION RATE: 16 BRPM | DIASTOLIC BLOOD PRESSURE: 72 MMHG | HEIGHT: 65.98 IN | OXYGEN SATURATION: 98 % | WEIGHT: 152.25 LBS | TEMPERATURE: 97 F | HEART RATE: 88 BPM

## 2025-06-13 DIAGNOSIS — J43.2 CENTRILOBULAR EMPHYSEMA (HCC): ICD-10-CM

## 2025-06-13 DIAGNOSIS — E78.49 OTHER HYPERLIPIDEMIA: ICD-10-CM

## 2025-06-13 DIAGNOSIS — K55.1 SUPERIOR MESENTERIC ARTERY STENOSIS (HCC): ICD-10-CM

## 2025-06-13 DIAGNOSIS — I77.79 DISSECTION OF MESENTERIC ARTERY (HCC): ICD-10-CM

## 2025-06-13 DIAGNOSIS — C61 PROSTATE CANCER (HCC): ICD-10-CM

## 2025-06-13 DIAGNOSIS — D69.6 LOW PLATELET COUNT: ICD-10-CM

## 2025-06-13 DIAGNOSIS — Z96.1 PSEUDOPHAKIA OF BOTH EYES: ICD-10-CM

## 2025-06-13 DIAGNOSIS — I10 ESSENTIAL HYPERTENSION: ICD-10-CM

## 2025-06-13 DIAGNOSIS — Z51.81 ENCOUNTER FOR MEDICATION MONITORING: ICD-10-CM

## 2025-06-13 DIAGNOSIS — D69.2 SENILE PURPURA: ICD-10-CM

## 2025-06-13 DIAGNOSIS — I70.0 ATHEROSCLEROSIS OF AORTA: ICD-10-CM

## 2025-06-13 DIAGNOSIS — R73.03 PREDIABETES: ICD-10-CM

## 2025-06-13 DIAGNOSIS — I65.22 CAROTID STENOSIS, LEFT: ICD-10-CM

## 2025-06-13 DIAGNOSIS — R09.82 POST-NASAL DRIP: ICD-10-CM

## 2025-06-13 DIAGNOSIS — R13.12 OROPHARYNGEAL DYSPHAGIA: ICD-10-CM

## 2025-06-13 DIAGNOSIS — E03.9 ACQUIRED HYPOTHYROIDISM: Primary | ICD-10-CM

## 2025-06-13 DIAGNOSIS — Z86.03 HISTORY OF CAROTID BODY TUMOR: ICD-10-CM

## 2025-06-13 PROCEDURE — 99499 UNLISTED E&M SERVICE: CPT | Performed by: FAMILY MEDICINE

## 2025-06-13 PROCEDURE — G2211 COMPLEX E/M VISIT ADD ON: HCPCS | Performed by: FAMILY MEDICINE

## 2025-06-13 PROCEDURE — 99215 OFFICE O/P EST HI 40 MIN: CPT | Performed by: FAMILY MEDICINE

## 2025-06-13 RX ORDER — FLUTICASONE FUROATE AND VILANTEROL 100; 25 UG/1; UG/1
1 POWDER RESPIRATORY (INHALATION) DAILY
Qty: 28 EACH | Refills: 2 | Status: SHIPPED | OUTPATIENT
Start: 2025-06-13

## 2025-06-13 NOTE — PROGRESS NOTES
Samuel Romero is a 79 year old male.  HPI:    History of Present Illness  Samuel Romero is a 79 year old male who presents with his wife for f/u on meds and test results.    He underwent  bilat cataract eye surgery about 2 months ago. Post-operatively, he has increased sensitivity to light, making it difficult to keep his eyes open in bright conditions. He has completed his course of eye drops and has no drainage or redness. His vision has improved since the surgery.had f/u with Optho. Uses sunglasses prn.     He experiences intermittent choking spells that began after a previous surgery on his carotid artery in 2021. . These episodes occur a few times a month and are characterized by coughing spells and some SOB.  sometimes triggered by liquids or small food particles. No pain when swallowing but there is a sensation of post-nasal drip that is chronic. He does not take any allergy medications currently.  No f/c  No sinus pressure or congestion.    He has a history of elevated A1c levels, with the most recent reading at 6.1, indicating a prediabetic state. He consumes a diet high in carbohydrates and sugars, including beer and sweetened beverages.     He has a history of carotid artery stenosis and a superior mesenteric artery dissection. He stopped taking aspirin prior to his eye surgery and did not restart. He is on cholesterol medication, which has been effective in maintaining his cholesterol levels within normal limits. He also takes thyroid medication, which has kept his thyroid levels stable.  No unusual fatigue    Chronic ANGEL, which he states is due to overexertion at times, and usually not noted with daily activities, although wife states has some SOB with regular activities., He experienced low oxygen saturation during a previous visit to another doctor. He has an inhaler but does not use it regularly due to cost.  H/o COPD, is an ex-smoker,  and does not experience significant respiratory  symptoms.  Denies any CP or palpitations.       Current Outpatient Medications   Medication Sig Dispense Refill           PREDNISONE 10 MG Oral Tab Take 1 tablet (10 mg total) by mouth daily. 30 tablet 3    ABIRATERONE ACETATE 250 MG Oral Tab take 1 tablet (250mg total) BY MOUTH DAILY with a low fat meal 30 tablet 3    levothyroxine 112 MCG Oral Tab Take 1 tablet (112 mcg total) by mouth every morning before breakfast. 90 tablet 1    losartan 50 MG Oral Tab Take 1 tablet (50 mg total) by mouth daily. 90 tablet 2    atorvastatin 10 MG Oral Tab Take 1 tablet (10 mg total) by mouth nightly. 90 tablet 2    Glucos-Chondroit-Hyaluron-MSM (GLUCOSAMINE CHONDROITIN JOINT OR) Take by mouth daily.      Cholecalciferol (VITAMIN D3) 1000 UNITS Oral Cap Take 1 tablet by mouth daily.             ASPIRIN 81 OR Take 81 mg by mouth daily. (Patient not taking: Reported on 6/13/2025)           Past Medical History:    Cancer (HCC)    Prostate    Cancer (HCC)    Urothelial Cancer:low grade    Cataracts, bilateral    COPD (chronic obstructive pulmonary disease) (HCC)    COVID-19 virus infection    Essential hypertension    Hyperlipidemia    Hypothyroidism    Hypothyroidism    Narrow angle of anterior chamber of both eyes    Prostate cancer (HCC)    Superior mesenteric artery stenosis (HCC)       Past Surgical History:   Procedure Laterality Date    Cataract Right 03/20/2025    Cataract Left 04/10/2025    Colonoscopy  2008    Colonoscopy  06/2016    Normal, recheck 10 yrs    Hernia surgery  2013    inguinal    Other surgical history  02/2016    Robotic Assisted Lap Prostatectomy/Pelvic lymphadenoctomy    Other surgical history  01/31/2017    cyst excision left jaw    Other surgical history  12/2020    Cystoscopy under anesthesia, clot evacuation, bladder biopsy, fulguration, bilateral retrograde pyelography, fluoroscopic interpretation, 20 [place] three-way catheter placement for continuous bladder irrigation.    Other surgical history   06/21/2021    Left neck exploration with excision of left carotid body tumor.      Other surgical history  01/26/2022    Cystoscopy Dr. Peña    Other surgical history  07/24/2024    No sig findings. recheck one year    Skin surgery  10/2019    skin lesion biopsy x 2, benign    Tonsillectomy  1953    Vasectomy      Yag iridotomy - ou - both eyes  07/2019         Social History     Socioeconomic History    Marital status:    Tobacco Use    Smoking status: Former     Current packs/day: 0.00     Average packs/day: 0.5 packs/day for 50.0 years (25.0 ttl pk-yrs)     Types: Cigarettes     Start date: 5/15/1965     Quit date: 5/15/2015     Years since quitting: 10.1    Smokeless tobacco: Former     Quit date: 7/1/2015   Vaping Use    Vaping status: Never Used   Substance and Sexual Activity    Alcohol use: Yes     Alcohol/week: 3.0 standard drinks of alcohol     Types: 3 Cans of beer per week     Comment: sometimes    Drug use: Yes     Types: Cannabis     Comment: 1 x per week   Other Topics Concern    Seat Belt Yes     Social Drivers of Health     Food Insecurity: No Food Insecurity (1/21/2025)    NCSS - Food Insecurity     Worried About Running Out of Food in the Last Year: No     Ran Out of Food in the Last Year: No   Transportation Needs: No Transportation Needs (1/21/2025)    NCSS - Transportation     Lack of Transportation: No   Housing Stability: At Risk (1/21/2025)    NCSS - Housing/Utilities     Has Housing: Yes     Worried About Losing Housing: No     Unable to Get Utilities: Yes                 REVIEW OF SYSTEMS:  GENERAL HEALTH: feels well otherwise, mood is good  SKIN: easy bruising on UEs.   RESPIRATORY: as above  CARDIOVASCULAR: denies chest pain on exertion  GI: denies abdominal pain and denies heartburn  : normal bladder  NEURO: denies headaches, denies dizziness    EXAM:  /72 (BP Location: Left arm, Patient Position: Sitting, Cuff Size: adult)   Pulse 88   Temp 96.5 °F (35.8 °C)  (Temporal)   Resp 16   Ht 5' 5.98\" (1.676 m)   Wt 152 lb 4 oz (69.1 kg)   SpO2 98%   BMI 24.59 kg/m²   Wt Readings from Last 6 Encounters:   06/13/25 152 lb 4 oz (69.1 kg)   04/07/25 150 lb 9.6 oz (68.3 kg)   03/11/25 152 lb 2 oz (69 kg)   01/21/25 151 lb (68.5 kg)   01/13/25 150 lb (68 kg)   11/15/24 150 lb (68 kg)       GENERAL: well developed, well nourished,in no apparent distress, pleasant affect  SKIN: purpuric lesions noted on bilat forearms.   HEENT: atraumatic, normocephalic,  Conj clear  OMM, throat without erythema, clear post nasal drip.   NECK: supple,no adenopathy,noTM, stable R sebaceous cyst,about 8 mm, mobile NT  LUNGS: good air exchange. No wheezing, sl decreased BS at bases. No retractions  CARDIO: RRR without murmur  GI: NABS, soft, no tenderness, no masses, no HSM, ND  EXTREMITIES: no cyanosis, clubbing or edema  NEURO: A&O x3, no focal deficits  Normal gait    ASSESSMENT AND PLAN:    1. Acquired hypothyroidism  TSH normal  Cont current Levothyroxine dose  - US THYROID (CPT=76536); Future    2. Oropharyngeal dysphagia  possibly related to post-nasal drip. No odynophagia, but sensation of a foreign body in the throat at thime. Differential includes thyroid nodule or swallowing dysfunction.  - Order thyroid ultrasound.  - Consider video swallow study and/or ENT eval  if symptoms persist.  - Claritin as below. .  - US THYROID (CPT=76536); Future    3. Post-nasal drip  Advise daily loratadine 10 mg for two weeks, then as needed for post-nasal drip    4. Centrilobular emphysema (HCC)  Pt is an ex-smoker, quitting about 10 years ago  CT imaging with extensive emphysematous changes.  Experiences occasional dyspnea and had low oxygen saturation , 89% with recent Oncology visit.. Previously prescribed inhaler was not used due to cost. Discussed use of maintenance inhaler Breo and rescue inhaler albuterol. Explained Breo inhaler contains a steroid and requires mouth rinsing post-use to prevent  thrush.  - Prescribe Breo inhaler daily and instruct to rinse mouth after use.  Will do better with Breo due to ease of use.   - Advise use of albuterol inhaler 2 puffs q 6 hrs as needed for dyspnea, states has at home.  -Discussed imp of tx in light of sxs and episode of hypoxia.  - advised referral to  pulmonologist for further evaluation and tx, defers at this time. Will try Breo as prescribed and monitor.  - fluticasone furoate-vilanterol (BREO ELLIPTA) 100-25 MCG/ACT Inhalation Aerosol Powder, Breath Activated; Inhale 1 puff into the lungs daily.  Dispense: 28 each; Refill: 2    5. Essential hypertension  Controlled. Cont current dose of Losartan daily  Lytes normal  monitor  - Basic Metabolic Panel (8) [E]; Future    6. Dissection of mesenteric artery (HCC)  7. Superior mesenteric artery stenosis (HCC)  Chronic  Advised vascular surgeon f/u  - Vascular Surgery - External    8. History of carotid body tumor  H/o surgery , benign  - Vascular Surgery - External    9. Carotid stenosis, left  10. Atherosclerosis of aorta  Restart baby Aspirin daily  Cont statin  Keep BP, BS and lipids optimized  - Vascular Surgery - External    11. Prediabetes  A1c increased to 6.1, indicating prediabetes. Higher than previous. Discussed lifestyle modifications to prevent progression to diabetes.   On Prednisone, which may be contributing factor.  Prefers to manage with diet and exercise rather than starting medication. Discussed potential use of metformin if A1c continues to rise.  - Recheck A1c in four months  - Basic Metabolic Panel (8) [E]; Future  - Hemoglobin A1C; Future    12. Senile purpura  Moisturize regularly.     13. Prostate cancer (HCC)  PSA undetectable  Cont tx per Oncology  Had recent f/u    14. Other hyperlipidemia  Lipids at goal  LFTs normal  Cont statin.     15. Pseudophakia of both eyes.  Post-operative photophobia following cataract surgery a couple of months ago.. Vision has improved, but some light  sensitivity persists. No other sxs.  Completed post-operative eye drop and had f/u with Optho.  - Advise wearing sunglasses outdoors to reduce photophobia.    16. Low platelet count  Mild, stable. Monitor     17.  Encounter for medication monitoring  - Basic Metabolic Panel (8) [E]; Future

## 2025-06-13 NOTE — PATIENT INSTRUCTIONS
VISIT SUMMARY:    Today,we reviewed your history of prediabetes, carotid artery stenosis, and centrilobular emphysema. We talked about your recent eye surgery and the improvements in your vision. We also discussed your current medications and lifestyle habits and choking episodes.     YOUR PLAN:    -INTERMITTENT CHOKING SPELLS: Your choking spells may be related to post-nasal drip or a thyroid issue. We will start you on loratadine (Claritin) 10 mg daily for two weeks to help with post-nasal drip, and then as needed. We will also order a thyroid ultrasound to check for any thyroid issues. If the symptoms persist, we may consider a video swallow study.    -CENTRILOBULAR EMPHYSEMA: Centrilobular emphysema is a type of chronic lung disease that can cause shortness of breath. We will prescribe a daily Breo inhaler, which contains a steroid to help with inflammation, and you should rinse your mouth after using it to prevent thrush. You should also use an albuterol inhaler as needed for shortness of breath. We will consider referral to a pulmonologist for further evaluation.    -CAROTID ARTERY STENOSIS AND ABDOMINAL ANEURYSM: Carotid artery stenosis and abdominal aneurysm are conditions that affect the blood vessels. We recommend you follow up with a vascular surgeon for monitoring. You should also restart taking daily aspirin for cardiovascular protection.    -PREDIABETES: Prediabetes means your blood sugar levels are higher than normal but not high enough to be classified as diabetes. To prevent progression to diabetes, you should reduce your intake of high-carbohydrate foods and sugary drinks. We will recheck your A1c in four months to monitor your progress.    -AGE-RELATED NUCLEAR CATARACT OF BOTH EYES: You have light sensitivity following your cataract surgery. Your vision has improved, but the light sensitivity persists. We recommend wearing sunglasses outdoors to help reduce this sensitivity.

## 2025-06-23 NOTE — PROGRESS NOTES
[facility] Progress Note       Patient Name: Samuel Romero   YOB: 1945  Medical Record Number: [id number]  Attending Physician: Alejandro Pickett M.D.     The 21st Century Cures Act makes medical notes like these available to patients in the interest of transparency. Please be advised this is a medical document. Medical documents are intended to carry relevant information, facts as evident, and the clinical opinion of the practitioner. The medical note is intended as peer to peer communication and may appear blunt or direct. It is written in medical language and may contain abbreviations or verbiage that are unfamiliar.     Date of Visit: 6/30/2025       Chief Complaint  Prostate cancer - follow up.     Oncologic History  Samuel Romero is a 79 year old male referred for thrombocytosis. A review of the patient's laboratory studies in the Epic system show a normal platelet count of 308 in 04/2014. By 05/2015 the platelet count had increased to 528 K/mcl. Over time it continued to increase to its most recent value of 911 K/mcl on 10/11/2016. Patient has a history of prostate cancer for which he underwent prostatectomy in 02/2016 followed by external beam radiotherapy and short term androgen ablation. He had a colonoscopy in 2016 which was unremarkable.     Patient was empirically started on hydroxyurea while workup was initiated. Peripheral blood for JAK2, CALR, and MPL and BCR/ABL ultimately returned as negative. Bone marrow biopsy was performed but was not diagnostic. Repeat bone marrow biopsy was unremarkable.     It was then discovered that patient was drinking at least 6 cans of beer per day. He was asked to decrease his alcohol intake. With that his complete blood count normalized.     Routine follow up PSA on 09/30/2021 was 0.15 ng/ml as compared to 0.02 ng/ml on 03/09/2021. PSA was repeated on 01/10/2022 and had increased to 0.40 ng/ml. PSMA PET scan on  01/21/2022 showed uptake in a solitary bone lesion involving the 10th right rib.     From 02/21/2022 to 02/25/2022 he received radiation therapy to the right rib lesion.    From 07/17/2023 to 07/21/2023 he received SBRT to a left rib lesion after an increase in PSA.    Genetic testing performed on 03/07/2022 showed no known pathogenic variants in 84 genes including: AIP, ALK, APC, COLTON, AXIN2, BAP1, BARD1, BLM, BMPR1A, BRCA1, BRCA2, BRIP1, CASR, CDC73, CDH1, CDK4, CDKN1B, CDKN1C, CDKN2A (p14ARF), CDKN2A (p.69FVY0s), CEBPA, CHEK2, DICER1, DIS3L2, EGFR (sequencing only), FH, FLCN, GATA2, GPC3, GREM1 (promoter region deletion/duplication testing only), HOXB13 (sequencing only), HRAS, KIT,MAX,  MEN1, MET, MITF (sequencing only), MLH1, MSH2 (including EPCAM rearrangement testing), MSH6, MUTYH, NBN, NF1, NF2, PALB2, PDGRFA, PHOX2B, PMS2, POLD1, POLE, POT1, OGVEM4F, PTCH1, PTEN, RAD50, RAD51C, RAD51D, RB1, RECQL4, RET, RUNX1, SDHAF2, SDHA (sequencing only), SDHB, SDHC, SDHD, SMAD4, SMARCA4, SMARCB1, SMARCE1, STK11, SUFU, TERC, TERT, ZGSA343, TP53, TSC1, TSC2, VHL, WRN, and WT1.  Four variants of uncertain significance, FLCN c.850G>A (p.Yxi210Sos)(possibly mosaic), MSH6 c.1685A>G (p.Ohi339Ynb), PALB2 c.37G>A (p.Zxr56Vsr), and WRN c.1531G>C (p.Ajf031Nsi), were identified.      On 09/2021 he was found to have new increase in PSA. In 02/2022, he underwent radiation therapy to a right rib lesion as the only site identified on a PSMA PET scan. Following radiation, however, his PSA did not decrease. In 07/2023 he underwent SBRT to a left rib lesion.     In 11/2023, a follow up PSMA PET scan was ordered but patient did not schedule. Instead, he sought an opinion at [facility] where a PSMA PET scan was ordered. This was performed on 01/08/2024 which showed multiple bone metastases.     PSA on 01/18/2024 was 8.43 ng/ml.     On 01/18/2024 he began systemic therapy with androgen ablation with triptorelin and soon after  abiraterone/prednisone were added.     History of Present Illness  Patient returns for follow up. He has no complaints. He reports mild hot flashes. No change in mood. He is taking abiraterone with a low fat meal.     Performance Status   Karnofsky 100% - Normal, no complaints.    Past Medical History (historical data, reviewed by physician)  Essential hypertension; hypothyroidism; hyperlipidemia; prostate cancer (Evelyn 4+5=9, [id number]) s/p prostatectomy and radiation therapy and short term androgen ablation; COVID 19; non-invasive low grade papillary urothelial carcinoma; paraganglioma.     Past Surgical History (historical data, reviewed by physician)  Tonsillectomy; vasectomy; robotic assisted laparoscopic prostatectomy and pelvic lymphadenectomy; inguinal hernia repair; TURBT; left neck paraganglioma.     Family History (historical data, reviewed by physician)  Sister with breast cancer; sister with colon cancer; father with prostate cancer.    Social History (historical data, reviewed by physician)  Previous tobacco use but quit 2015; uses alcohol daily.      Current Medications    LOSARTAN POTASSIUM OR Take 10 mg by mouth daily.      PREDNISONE 10 MG Oral Tab Take 1 tablet (10 mg total) by mouth daily. 30 tablet 3    ABIRATERONE ACETATE 250 MG Oral Tab take 1 tablet (250mg total) BY MOUTH DAILY with a low fat meal 30 tablet 3    levothyroxine 112 MCG Oral Tab Take 1 tablet (112 mcg total) by mouth every morning before breakfast. 90 tablet 1    atorvastatin 10 MG Oral Tab Take 1 tablet (10 mg total) by mouth nightly. 90 tablet 2    Glucos-Chondroit-Hyaluron-MSM (GLUCOSAMINE CHONDROITIN JOINT OR) Take by mouth in the morning.      ASPIRIN 81 OR Take 81 mg by mouth daily.      Cholecalciferol (VITAMIN D3) 1000 UNITS Oral Cap Take 1 tablet by mouth in the morning.       Allergies   Mr. Romero has no known allergies.    Vital Signs   Height: 167.6 cm (5' 5.98\") (06/30 1024)  Weight: 69 kg (152 lb 3.2 oz) (06/30  1024)  BSA (Calculated - sq m): 1.78 sq meters (06/30 1024)  Pulse: 91 (06/30 1024)  BP: 117/76 (06/30 1024)  Temp: 97.3 °F (36.3 °C) (06/30 1024)  Do Not Use - Resp Rate: --  SpO2: 89 % (06/30 1024)     Physical Examination   Constitutional Well developed, well nourished. No apparent distress.   Head  Normocephalic and atraumatic.  Eyes                  Conjunctiva clear; sclera anicteric.  ENMT  External nose normal; external ears normal.  Respiratory Normal effort; no respiratory distress; clear to auscultation bilaterally.  Cardiovascular Regular rate and rhythm; normal S1S2.  Abdomen Soft; not tender; no masses.   Extremities No lower extremity edema.   Neurologic Motor and sensory grossly intact.  Psychiatric Mood and affect appropriate.    Laboratory  Recent Results (from the past week)   COMP METABOLIC PANEL [E]    Collection Time: 06/30/25 10:14 AM   Result Value Ref Range    Glucose 96 70 - 99 mg/dL    Sodium 141 136 - 145 mmol/L    Potassium 4.1 3.5 - 5.1 mmol/L    Chloride 104 98 - 112 mmol/L    CO2 26.0 21.0 - 32.0 mmol/L    Anion Gap 11 0 - 18 mmol/L    BUN 10 9 - 23 mg/dL    Creatinine 0.87 0.70 - 1.30 mg/dL    Calcium, Total 10.4 8.7 - 10.6 mg/dL    Calculated Osmolality 291 275 - 295 mOsm/kg    eGFR-Cr 89 >=60 mL/min/1.73m2    AST 18 <34 U/L    ALT 14 10 - 49 U/L    Alkaline Phosphatase 60 45 - 117 U/L    Bilirubin, Total 1.0 0.2 - 1.1 mg/dL    Total Protein 7.1 5.7 - 8.2 g/dL    Albumin 4.7 3.2 - 4.8 g/dL    Globulin  2.4 2.0 - 3.5 g/dL    A/G Ratio 2.0 1.0 - 2.0    Patient Fasting for CMP? Patient not present    CBC W/DIFF [E]    Collection Time: 06/30/25 10:14 AM   Result Value Ref Range    WBC 5.8 4.0 - 11.0 x10(3) uL    RBC 4.59 3.80 - 5.80 x10(6)uL    HGB 14.9 13.0 - 17.5 g/dL    HCT 44.2 39.0 - 53.0 %    .0 150.0 - 450.0 10(3)uL    MCV 96.3 80.0 - 100.0 fL    MCH 32.5 26.0 - 34.0 pg    MCHC 33.7 31.0 - 37.0 g/dL    RDW 12.6 %    Neutrophil Absolute Prelim 3.79 1.50 - 7.70 x10 (3) uL     Neutrophil Absolute 3.79 1.50 - 7.70 x10(3) uL    Lymphocyte Absolute 0.81 (L) 1.00 - 4.00 x10(3) uL    Monocyte Absolute 1.05 (H) 0.10 - 1.00 x10(3) uL    Eosinophil Absolute 0.02 0.00 - 0.70 x10(3) uL    Basophil Absolute 0.01 0.00 - 0.20 x10(3) uL    Immature Granulocyte Absolute 0.12 0.00 - 1.00 x10(3) uL    Neutrophil % 65.3 %    Lymphocyte % 14.0 %    Monocyte % 18.1 %    Eosinophil % 0.3 %    Basophil % 0.2 %    Immature Granulocyte % 2.1 %   PSA - DIAGNOSTIC [E]    Collection Time: 06/30/25 10:14 AM   Result Value Ref Range    Total PSA  <0.04 <=4.00 ng/mL     Impression and Plan   1.   Prostate cancer, metastatic: PSA is undetectable. He is tolerating therapy well. Continue combined androgen ablation with triptorelin and abiraterone/prednisone. Triptorelin today.     2.   Bone metastasis: Zoledronic acid due today.    3.   Hypoxia: Patient has documented emphysematous changes on CT scan. He previously was advised to see pulmonology but declined. He again declines referral today.     Planned Follow Up   Patient will return for follow up and therapy in 3 months.    Electronically Signed by:     Alejandro Pickett M.D.  System Medical Director, Oncology Services  [facility]

## 2025-06-24 NOTE — TELEPHONE ENCOUNTER
Please see what is formulary alternative.   Prefer Breo for ease of use for pt with once daily dosing and dry powder inhalation vs MDI

## 2025-06-24 NOTE — TELEPHONE ENCOUNTER
Closed   6/20/2025  8:45 AM  Close reason: Other   Note from payer: We have dismissed the request for coverage we received from you or your prescriber because notification has been received from you or your prescriber that you will be switched to a formulary alternative and/or quantity allowed by your benefit, or you or your provider withdrew the request. If any alteration to an existing prescription or a new prescription is needed, please have the provider contact your pharmacy. The pharmacy may contact the Help Desk at [phone] if they need assistance in processing a claim. If you don't agree with this decision, within 6 months, you or your provider can call [phone] and ask us to vacate or reconsider the dismissal. Your provider can also submit a request online at [email or web address].

## 2025-06-25 NOTE — TELEPHONE ENCOUNTER
Per prior auth note, Brand Breo is preferred.   Called pharmacy and confirmed Breo is ready for .

## 2025-06-30 ENCOUNTER — NURSE ONLY (OUTPATIENT)
Age: 80
End: 2025-06-30
Attending: INTERNAL MEDICINE
Payer: MEDICARE

## 2025-06-30 ENCOUNTER — OFFICE VISIT (OUTPATIENT)
Age: 80
End: 2025-06-30
Attending: INTERNAL MEDICINE
Payer: MEDICARE

## 2025-06-30 VITALS
BODY MASS INDEX: 24.46 KG/M2 | WEIGHT: 152.19 LBS | DIASTOLIC BLOOD PRESSURE: 76 MMHG | HEIGHT: 65.98 IN | TEMPERATURE: 97 F | OXYGEN SATURATION: 89 % | HEART RATE: 91 BPM | SYSTOLIC BLOOD PRESSURE: 117 MMHG | RESPIRATION RATE: 16 BRPM

## 2025-06-30 DIAGNOSIS — C79.51 PROSTATE CANCER METASTATIC TO BONE (HCC): ICD-10-CM

## 2025-06-30 DIAGNOSIS — C61 PROSTATE CANCER METASTATIC TO BONE (HCC): ICD-10-CM

## 2025-06-30 DIAGNOSIS — R09.02 HYPOXIA: ICD-10-CM

## 2025-06-30 DIAGNOSIS — Z79.899 ON ANDROGEN ABLATION THERAPY: ICD-10-CM

## 2025-06-30 DIAGNOSIS — C61 PROSTATE CANCER (HCC): Primary | ICD-10-CM

## 2025-06-30 LAB
ALBUMIN SERPL-MCNC: 4.7 G/DL (ref 3.2–4.8)
ALBUMIN/GLOB SERPL: 2 {RATIO} (ref 1–2)
ALP LIVER SERPL-CCNC: 60 U/L (ref 45–117)
ALT SERPL-CCNC: 14 U/L (ref 10–49)
ANION GAP SERPL CALC-SCNC: 11 MMOL/L (ref 0–18)
AST SERPL-CCNC: 18 U/L (ref ?–34)
BASOPHILS # BLD AUTO: 0.01 X10(3) UL (ref 0–0.2)
BASOPHILS NFR BLD AUTO: 0.2 %
BILIRUB SERPL-MCNC: 1 MG/DL (ref 0.2–1.1)
BUN BLD-MCNC: 10 MG/DL (ref 9–23)
CALCIUM BLD-MCNC: 10.4 MG/DL (ref 8.7–10.6)
CHLORIDE SERPL-SCNC: 104 MMOL/L (ref 98–112)
CO2 SERPL-SCNC: 26 MMOL/L (ref 21–32)
CREAT BLD-MCNC: 0.87 MG/DL (ref 0.7–1.3)
EGFRCR SERPLBLD CKD-EPI 2021: 89 ML/MIN/1.73M2 (ref 60–?)
EOSINOPHIL # BLD AUTO: 0.02 X10(3) UL (ref 0–0.7)
EOSINOPHIL NFR BLD AUTO: 0.3 %
ERYTHROCYTE [DISTWIDTH] IN BLOOD BY AUTOMATED COUNT: 12.6 %
GLOBULIN PLAS-MCNC: 2.4 G/DL (ref 2–3.5)
GLUCOSE BLD-MCNC: 96 MG/DL (ref 70–99)
HCT VFR BLD AUTO: 44.2 % (ref 39–53)
HGB BLD-MCNC: 14.9 G/DL (ref 13–17.5)
IMM GRANULOCYTES # BLD AUTO: 0.12 X10(3) UL (ref 0–1)
IMM GRANULOCYTES NFR BLD: 2.1 %
LYMPHOCYTES # BLD AUTO: 0.81 X10(3) UL (ref 1–4)
LYMPHOCYTES NFR BLD AUTO: 14 %
MCH RBC QN AUTO: 32.5 PG (ref 26–34)
MCHC RBC AUTO-ENTMCNC: 33.7 G/DL (ref 31–37)
MCV RBC AUTO: 96.3 FL (ref 80–100)
MONOCYTES # BLD AUTO: 1.05 X10(3) UL (ref 0.1–1)
MONOCYTES NFR BLD AUTO: 18.1 %
NEUTROPHILS # BLD AUTO: 3.79 X10 (3) UL (ref 1.5–7.7)
NEUTROPHILS # BLD AUTO: 3.79 X10(3) UL (ref 1.5–7.7)
NEUTROPHILS NFR BLD AUTO: 65.3 %
OSMOLALITY SERPL CALC.SUM OF ELEC: 291 MOSM/KG (ref 275–295)
PLATELET # BLD AUTO: 155 10(3)UL (ref 150–450)
POTASSIUM SERPL-SCNC: 4.1 MMOL/L (ref 3.5–5.1)
PROT SERPL-MCNC: 7.1 G/DL (ref 5.7–8.2)
PSA SERPL-MCNC: <0.04 NG/ML (ref ?–4)
RBC # BLD AUTO: 4.59 X10(6)UL (ref 3.8–5.8)
SODIUM SERPL-SCNC: 141 MMOL/L (ref 136–145)
WBC # BLD AUTO: 5.8 X10(3) UL (ref 4–11)

## 2025-06-30 NOTE — PROGRESS NOTES
Pt here for C7D1 Drug(s)trelstar.  Arrives Ambulating independently, accompanied by Self and Spouse     Patient was evaluated today by Treatment Nurse.    Oral medications included in this regimen:  no    Patient confirms comprehension of cancer treatment schedule:  yes    Pregnancy screening:  Not applicable    Modifications in dose or schedule: no    Medications appearance and physical integrity checked by RN: yes.    Chemotherapy IV pump settings verified by 2 RNs:  Yes.  Frequency of blood return and site check throughout administration: Prior to administration     Infusion/treatment outcome:  patient tolerated treatment without incident    Education Record    Learner:  Patient and Spouse  Barriers / Limitations:  None  Method:  Brief focused  Education / instructions given:    Outcome:  Shows understanding    Discharged Home, Ambulating independently, accompanied by:Self and Spouse    Patient/family verbalized understanding of future appointments: by [employer] messaging

## 2025-06-30 NOTE — PROGRESS NOTES
Patient is here for 3 month MD follow up for Prostate Cancer. Due for Trelstar and Zometa today. O2 sat 89% today, declined oxygen. Has Breo inhaler at home but has not started. Feeling well otherwise. Denies pain. Patient continues on Zytiga/Prednisone daily. Tolerating well.      Education Record    Learner:  Patient    Disease / Diagnosis: Prostate cancer     Barriers / Limitations:  None   Comments:    Method:  Discussion   Comments:    General Topics:  Plan of care reviewed   Comments:    Outcome:  Shows understanding   Comments:

## 2025-08-06 DIAGNOSIS — C61 PROSTATE CANCER METASTATIC TO BONE (HCC): ICD-10-CM

## 2025-08-06 DIAGNOSIS — C79.51 PROSTATE CANCER METASTATIC TO BONE (HCC): ICD-10-CM

## 2025-08-06 RX ORDER — ABIRATERONE ACETATE 250 MG/1
TABLET ORAL
Qty: 30 TABLET | Refills: 5 | Status: SHIPPED | OUTPATIENT
Start: 2025-08-06

## 2025-08-06 RX ORDER — PREDNISONE 10 MG/1
10 TABLET ORAL DAILY
Qty: 30 TABLET | Refills: 5 | Status: SHIPPED | OUTPATIENT
Start: 2025-08-06

## (undated) DIAGNOSIS — C61 PROSTATE CANCER (HCC): Primary | ICD-10-CM

## (undated) DEVICE — CAUTERY CORD DISP E0503

## (undated) DEVICE — SUTURE MONOCRYL 4-0 PS-2

## (undated) DEVICE — SUTURE VICRYL 3-0 SH

## (undated) DEVICE — FLOSEAL HEMOSTATIC MATRIX, 5ML: Brand: FLOSEAL HEMOSTATIC MATRIX

## (undated) DEVICE — KENDALL SCD EXPRESS SLEEVES, KNEE LENGTH, MEDIUM: Brand: KENDALL SCD

## (undated) DEVICE — STERILE POLYISOPRENE POWDER-FREE SURGICAL GLOVES: Brand: PROTEXIS

## (undated) DEVICE — CV PACK-LF: Brand: MEDLINE INDUSTRIES, INC.

## (undated) DEVICE — STANDARD HYPODERMIC NEEDLE,POLYPROPYLENE HUB: Brand: MONOJECT

## (undated) DEVICE — SUTURE PROLENE 7-0 BV-1

## (undated) DEVICE — SOL  .9 1000ML BTL

## (undated) DEVICE — SKIN AFFIX .4ML

## (undated) DEVICE — TIGERTAIL 5F FLXTIP 70CM

## (undated) DEVICE — SYRINGE 10ML LL TIP

## (undated) DEVICE — DISPOSABLE BIPOLAR FORCEPS 4" (10.2CM) JEWELERS, STRAIGHT 0.4MM TIP AND 12 FT. (3.6M) CABLE: Brand: KIRWAN

## (undated) DEVICE — SOLO HYBRID WIRE 35 FLEX STR

## (undated) DEVICE — TRANSPOSAL ULTRAFLEX DUO/QUAD ULTRA CART MANIFOLD

## (undated) DEVICE — SUTURE PROLENE 6-0 C-1

## (undated) DEVICE — Device

## (undated) DEVICE — REM POLYHESIVE ADULT PATIENT RETURN ELECTRODE: Brand: VALLEYLAB

## (undated) DEVICE — GOWN SURG AERO CHROME XXL

## (undated) DEVICE — SUTURE SILK 2-0

## (undated) DEVICE — 3M™ IOBAN™ 2 ANTIMICROBIAL INCISE DRAPE 6650EZ: Brand: IOBAN™ 2

## (undated) DEVICE — CYSTO CDS-LF: Brand: MEDLINE INDUSTRIES, INC.

## (undated) DEVICE — SYRINGE 30ML LL TIP

## (undated) DEVICE — INTENDED TO BE USED TO OCCLUDE, RETRACT AND IDENTIFY ARTERIES, VEINS, TENDONS AND NERVES IN SURGICAL PROCEDURES: Brand: STERION®  VESSEL LOOP

## (undated) NOTE — MR AVS SNAPSHOT
After Visit Summary   2/9/2017    Sonya Ko    MRN: [id number]           Diagnoses this Visit     Essential thrombocythemia [facility])    -  Primary     Prostate cancer ([facility] [address]           Allergies     No Known Allergies      Your Vital Signs Were The following orders were created for panel order CBC WITH DIFFERENTIAL WITH PLATELET.   Procedure                               Abnormality         Status                     ---------                               -----------         ------

## (undated) NOTE — LETTER
Verna Barr 89  [address] [facility] [address] [facility]  Authorization for Surgical Operation and Procedure     Date:___________                                                                                                         Time:__________ 4.   Should the need arise during my operation or immediate post-operative period, I also consent to the administration of blood and/or blood products.   Further, I understand that despite careful testing and screening of blood or blood products by jhon 8.   I recognize that in the event my procedure results in extended X-Ray/fluoroscopy time, I may develop a skin reaction. 9.  If I have a Do Not Attempt Resuscitation (DNAR) order in place, that status will be suspended while in the operating room, proc 1. Vamshi GRIFFIN agree to be cared for by an anesthesiologist, who is specially trained to monitor me and give me medicine to put me to sleep or keep me comfortable during my procedure    I understand that my anesthesiologist is not an employee or ag 5. My doctor has explained to me other choices available to me for my care (alternatives).   6. Pregnant Patients (“epidural”):  I understand that the risks of having an epidural (medicine given into my back to help control pain during labor), include itchi

## (undated) NOTE — LETTER
10/18/21        Soo Romero  [address] [address]      Dear Kaleb Chiu,    [address] [facility] records indicate that you have outstanding lab work and or testing that was ordered for you and has not yet been completed:  Orders Placed This [place]

## (undated) NOTE — LETTER
07/30/20        Alma Romero  [address] [address]      Dear [facility],    [facility] records indicate that you have outstanding lab work and or testing that was ordered for you and has not yet been completed:  Orders Placed This [place]

## (undated) NOTE — LETTER
18    Re: Jerson Harris  : 1945    To Whom It May Concern:    Jerson Harris is an established patient at this office. He is medically cleared to undergo routine dental cleaning and dental work-up as deemed necessary. Thank you.     Since

## (undated) NOTE — LETTER
November 13, 2019    Dotty Romero  [address] [place] [address]    Dear Narciso Pallas: It was a pleasure speaking with you over the phone recently.  To follow up, I wanted to send you some contact information to utilize when you hav

## (undated) NOTE — LETTER
DENTAL CLEARANCE FOR PATIENT NEEDING DENTAL WORK WHILE ON CANCER TREATMENT    3/6/2024    Attention:  [facility]  Phone:  [phone]  Fax:  [phone]    Our mutual patient, Samuel Romero is under our care for cancer treatment and is scheduled at your office for a dental procedure.    It is okay to proceed from my standpoint.    If you have any additional information or questions, please call [phone] between the hours of [phone] Monday-Friday.          Thank you,    Alejandro Pickett MD

## (undated) NOTE — LETTER
01/04/21        [place] Romero  [address] [facility] [address]      Dear Jacbo Lawson,    [address] [facility] records indicate that you have outstanding lab work and or testing that was ordered for you and has not yet been completed:  Orders Placed This [place]

## (undated) NOTE — MR AVS SNAPSHOT
After Visit Summary   1/9/2017    Nora Gomez    MRN: [id number]           Diagnoses this Visit     Essential thrombocythemia [facility])    -  Primary     Macrocytosis           Allergies     No Known Allergies      Your Vital Signs Were     BP Puls ---------                               -----------         ------                     CBC W/ DIFFERENTIAL[[id number]]                              In process                   Please view results for these tests on the individual orders.          Result Sum

## (undated) NOTE — MR AVS SNAPSHOT
After Visit Summary   2/24/2017    Joseph Person    MRN: [id number]           Diagnoses this Visit     Essential thrombocythemia [facility])    -  Primary     [facility]           Allergies     No Known Allergies      Your Vital Signs Were Other situations in which to contact your health care team include:    Developing a persistent fever    Shortness of breath    Unrelenting or worsening pain or discomfort    Swelling at the biopsy site    Increasing redness or drainage at the biopsy site

## (undated) NOTE — MR AVS SNAPSHOT
After Visit Summary   5/5/2017    Shruthi Landeros    MRN: [id number]           Diagnoses this Visit     Prostate cancer [facility])    -  Primary     Essential thrombocythemia ([facility] [address]           Allergies     No Known Allergies      Your Vital Signs Were Please view results for these tests on the individual orders.          Result Summary for CBC W/ DIFFERENTIAL      Component Results     Component Value Flag Ref Range Units Status    WBC 3.9 (L) 4.0-13.0  x10(3) uL Final    RBC 3.78 (L) 3.80-5.80  x10(6)uL

## (undated) NOTE — LETTER
04/24/23        Kelly Williamsonhaley Romero  [address] [facility] [address]      Dear Timo Leone records indicate that you have outstanding lab work and or testing that was ordered for you and has not yet been completed:  Orders Placed This Encounter      CBC With Differential With Platelet      Comp Metabolic Panel (14)      Lipid Panel      TSH [899] [Q]      T4 FREE [866] [Q]      Vascular Surgery - In Network    To provide you with the best possible care, please complete these orders at your earliest convenience. If you have recently completed these orders please disregard this letter. If you have any questions please call the office at Dept: [phone].      Thank you,       Sheree Carreon MD

## (undated) NOTE — LETTER
Verna Barr [address]  [address] [facility] [address] [facility]  Authorization for Surgical Operation and Procedure     Date:___________                                                                                                         Time:__________ 4.   Should the need arise during my operation or immediate post-operative period, I also consent to the administration of blood and/or blood products.   Further, I understand that despite careful testing and screening of blood or blood products by jhon 8.   I recognize that in the event my procedure results in extended X-Ray/fluoroscopy time, I may develop a skin reaction. 9.  If I have a Do Not Attempt Resuscitation (DNAR) order in place, that status will be suspended while in the operating room, proc 1. I, Skylar Corado agree to be cared for by an anesthesiologist, who is specially trained to monitor me and give me medicine to put me to sleep or keep me comfortable during my procedure    I understand that my anesthesiologist is not an employee or ag 5. My doctor has explained to me other choices available to me for my care (alternatives).   6. Pregnant Patients (“epidural”):  I understand that the risks of having an epidural (medicine given into my back to help control pain during labor), include itchi